# Patient Record
Sex: FEMALE | Race: WHITE | NOT HISPANIC OR LATINO | Employment: FULL TIME | URBAN - METROPOLITAN AREA
[De-identification: names, ages, dates, MRNs, and addresses within clinical notes are randomized per-mention and may not be internally consistent; named-entity substitution may affect disease eponyms.]

---

## 2017-01-06 ENCOUNTER — TELEPHONE (OUTPATIENT)
Dept: OBSTETRICS AND GYNECOLOGY | Facility: CLINIC | Age: 27
End: 2017-01-06

## 2017-01-06 NOTE — TELEPHONE ENCOUNTER
Pt c/o runny nose, sneezing, coughing, and low grade fever.  Suggested she go to urgent care for treatment.  She has been breastfeeding for 3 months, suggested Mucinex D but advised if she notices a decrease in supply to stop taking it.

## 2017-01-06 NOTE — TELEPHONE ENCOUNTER
Wax pt - pt said she has a cold and was running a low grade fever last night, she is breast feeding and wants to know what she can take for that. Also, she got an iud put in last week ans has been spotting this whole week, she wants to make sure that is normal.

## 2017-02-03 ENCOUNTER — OFFICE VISIT (OUTPATIENT)
Dept: OBSTETRICS AND GYNECOLOGY | Facility: CLINIC | Age: 27
End: 2017-02-03
Attending: OBSTETRICS & GYNECOLOGY
Payer: MEDICAID

## 2017-02-03 VITALS
DIASTOLIC BLOOD PRESSURE: 66 MMHG | HEIGHT: 66 IN | SYSTOLIC BLOOD PRESSURE: 110 MMHG | BODY MASS INDEX: 18.03 KG/M2 | WEIGHT: 112.19 LBS

## 2017-02-03 DIAGNOSIS — Z30.431 IUD CHECK UP: Primary | ICD-10-CM

## 2017-02-03 PROCEDURE — 99212 OFFICE O/P EST SF 10 MIN: CPT | Mod: PBBFAC | Performed by: OBSTETRICS & GYNECOLOGY

## 2017-02-03 PROCEDURE — 99213 OFFICE O/P EST LOW 20 MIN: CPT | Mod: S$PBB,,, | Performed by: OBSTETRICS & GYNECOLOGY

## 2017-02-03 PROCEDURE — 99999 PR PBB SHADOW E&M-EST. PATIENT-LVL II: CPT | Mod: PBBFAC,,, | Performed by: OBSTETRICS & GYNECOLOGY

## 2017-02-03 NOTE — PROGRESS NOTES
"CC: IUD check    Janene Greenberg is a 26 y.o. female  6 wk IUD check.  Having bleeding daily not heavy.  Hasn't had sex    Past Medical History   Diagnosis Date    Abnormal Pap smear of vagina 2014     colpo done no bx bc was pregnant    Depression      1st pregnancy       History reviewed. No pertinent past surgical history.    OB History    Para Term  AB SAB TAB Ectopic Multiple Living   3 3 3 0 0 0 0  0 1      # Outcome Date GA Lbr [place]/2nd Weight Sex Delivery Anes PTL Lv   3 Term 10/11/16 39w3d  3.402 kg (7 lb 8 oz) M Vag-Spont EPI N Y   2 Term 01/05/15 40w0d  3.856 kg (8 lb 8 oz) M Vag-Spont      1 Term 09 41w0d  3.799 kg (8 lb 6 oz) F Vag-Spont             History reviewed. No pertinent family history.    Social History   Substance Use Topics    Smoking status: Never Smoker    Smokeless tobacco: Never Used    Alcohol use No       Visit Vitals    /66    Ht 5' 6" (1.676 m)    Wt 50.9 kg (112 lb 3.4 oz)    Breastfeeding No    BMI 18.11 kg/m2       ROS:  GENERAL: Denies weight gain or weight loss. Feeling well overall.   SKIN: Denies rash or lesions.   HEAD: Denies head injury or headache.   NODES: Denies enlarged lymph nodes.   CHEST: Denies chest pain or shortness of breath.   CARDIOVASCULAR: Denies palpitations or left sided chest pain.   ABDOMEN: No abdominal pain, constipation, diarrhea, nausea, vomiting or rectal bleeding.   URINARY: No frequency, dysuria, hematuria, or burning on urination.  REPRODUCTIVE: See HPI.   BREASTS: denies pain, lumps, or nipple discharge.   HEMATOLOGIC: No easy bruisability or excessive bleeding.  MUSCULOSKELETAL: Denies joint pain or swelling.   NEUROLOGIC: Denies syncope or weakness.   PSYCHIATRIC: Denies depression, anxiety or mood swings.    Physical Exam:    APPEARANCE: Well nourished, well developed, in no acute distress.  AFFECT: WNL, alert and oriented x 3  SKIN: No acne or hirsutism  NECK: Neck symmetric without masses or " thyromegaly  NODES: No inguinal, cervical, axillary, or femoral lymph node enlargement  CHEST: Good respiratory effect  ABDOMEN: Soft.  No tenderness or masses.  No hepatosplenomegaly.  No hernias.  PELVIC: Normal external genitalia without lesions.  Normal hair distribution.  Adequate perineal body, normal urethral meatus.  Vagina moist and well rugated without lesions or discharge.  Cervix pink, without lesions, discharge or tenderness.  No significant cystocele or rectocele.  Bimanual exam shows uterus to be normal size, regular, mobile and nontender.  Adnexa without masses or tenderness.    EXTREMITIES: No edema.    ASSESSMENT AND PLAN    Diagnoses and all orders for this visit:    IUD check up    reassurance give it more time    Return if symptoms worsen or fail to improve.

## 2017-05-01 ENCOUNTER — TELEPHONE (OUTPATIENT)
Dept: OBSTETRICS AND GYNECOLOGY | Facility: CLINIC | Age: 27
End: 2017-05-01

## 2017-05-01 ENCOUNTER — OFFICE VISIT (OUTPATIENT)
Dept: OBSTETRICS AND GYNECOLOGY | Facility: CLINIC | Age: 27
End: 2017-05-01
Attending: OBSTETRICS & GYNECOLOGY
Payer: MEDICAID

## 2017-05-01 VITALS
BODY MASS INDEX: 18.09 KG/M2 | DIASTOLIC BLOOD PRESSURE: 74 MMHG | WEIGHT: 112.56 LBS | HEIGHT: 66 IN | SYSTOLIC BLOOD PRESSURE: 124 MMHG

## 2017-05-01 DIAGNOSIS — Z01.419 ENCOUNTER FOR GYNECOLOGICAL EXAMINATION: Primary | ICD-10-CM

## 2017-05-01 DIAGNOSIS — F41.9 ANXIETY AND DEPRESSION: ICD-10-CM

## 2017-05-01 DIAGNOSIS — Z12.4 SCREENING FOR MALIGNANT NEOPLASM OF THE CERVIX: ICD-10-CM

## 2017-05-01 DIAGNOSIS — F32.A ANXIETY AND DEPRESSION: ICD-10-CM

## 2017-05-01 PROCEDURE — 99999 PR PBB SHADOW E&M-EST. PATIENT-LVL III: CPT | Mod: PBBFAC,,, | Performed by: OBSTETRICS & GYNECOLOGY

## 2017-05-01 PROCEDURE — 88175 CYTOPATH C/V AUTO FLUID REDO: CPT

## 2017-05-01 PROCEDURE — 99395 PREV VISIT EST AGE 18-39: CPT | Mod: S$PBB,,, | Performed by: OBSTETRICS & GYNECOLOGY

## 2017-05-01 PROCEDURE — 99213 OFFICE O/P EST LOW 20 MIN: CPT | Mod: PBBFAC | Performed by: OBSTETRICS & GYNECOLOGY

## 2017-05-01 RX ORDER — ESCITALOPRAM OXALATE 10 MG/1
10 TABLET ORAL NIGHTLY
Qty: 30 TABLET | Refills: 11 | Status: SHIPPED | OUTPATIENT
Start: 2017-05-01 | End: 2017-11-26

## 2017-05-01 NOTE — MR AVS SNAPSHOT
"    [facility]  [address], [address]  [facility] [address]  Phone: [phone]  Fax: [phone]                  Janene Greenberg   2017 9:00 AM   Office Visit    Description:  Female : 1990   Provider:  Marc Granados MD   Department:  [facility]           Reason for Visit     Well Woman           Diagnoses this Visit        Comments    Screening for malignant neoplasm of the cervix    -  Primary            To Do List           Future Appointments        Provider Department Dept Phone    2017 9:45 AM Marc Granados MD [facility] [phone]      Goals (5 Years of Data)     None      [facility]     [facility] Care Line -  Assistance  Unless otherwise directed by your provider, please contact [facility], our nurse care line that is available for  assistance.     Registered nurses in the [facility] On [facility] Center provide: appointment scheduling, clinical advisement, health education, and other advisory services.  Call: [phone] (toll free)               Medications           Message regarding Medications     Verify the changes and/or additions to your medication regime listed below are the same as discussed with your clinician today.  If any of these changes or additions are incorrect, please notify your healthcare provider.             Verify that the below list of medications is an accurate representation of the medications you are currently taking.  If none reported, the list may be blank. If incorrect, please contact your healthcare provider. Carry this list with you in case of emergency.           Current Medications     levonorgestrel (MIRENA) 20 mcg/24 hr (5 years) IUD 1 each by Intrauterine route once. RETURN FOR INTRAUTERINE INSERTION IN THE OFFICE           Clinical Reference Information           Your Vitals Were     BP Height Weight BMI       124/74 5' 6" (1.676 m) 51.1 kg (112 lb 8.7 oz) 18.17 kg/m2       Blood " Pressure          Most Recent Value    BP  124/74      Allergies as of 5/1/2017     No Known Allergies      Immunizations Administered on Date of Encounter - 5/1/2017     None      Orders Placed During Today's Visit      Normal Orders This Visit    Liquid-based pap smear, screening       [facility] Sign-Up     Activating your [facility] account is as easy as 1-2-3!     1) Visit [email or web address], select Sign Up Now, enter this activation code and your date of birth, then select Next.  [email or web address]  Expires: 5/6/2017 12:09 PM      2) Create a username and password to use when you visit [facility] in the future and select a security question in case you lose your password and select Next.    3) Enter your e-mail address and click Sign Up!    Additional Information  If you have questions, please e-mail [email or web address] or call [phone] to talk to our [facility] staff. Remember, [facility] is NOT to be used for urgent needs. For medical emergencies, dial 911.         Language Assistance Services     ATTENTION: Language assistance services are available, free of charge. Please call [phone].      ATENCIÓN: Si habla caroline, tiene a blair disposición servicios gratuitos de asistencia lingüística. Llame al [phone].     CHÚ Ý: N?u b?n nói Ti?ng Vi?t, có các d?ch v? h? tr? ngôn ng? mi?n phí dành cho b?n. G?i s? [phone].         [facility] complies with applicable Federal civil rights laws and does not discriminate on the basis of race, color, national origin, age, disability, or sex.

## 2017-05-01 NOTE — TELEPHONE ENCOUNTER
Wax pt asking if she needs to take the lexapro daily or if she only needs to take it when she is feeling anxious or depressed. Advised that Lexapro is a medication that she would need to take every day in order for it to work. It is not a medication that you take when needed. Pt verbalized understanding and had no further questions

## 2017-05-01 NOTE — PROGRESS NOTES
"CC: Well woman exam    Janene Greenberg is a 26 y.o. female  presents for a well woman exam.  Breastfeeding 6mo having hard time staying at home with kids, significant anxiety, feels overwhelmed often.  Cries a lot. No SI/HI wants meds that I recommended previously.    Past Medical History:   Diagnosis Date    Abnormal Pap smear of cervix 2014    colpo done no bx bc was pregnant    Depression     1st pregnancy    HPV in female        History reviewed. No pertinent surgical history.    OB History    Para Term  AB SAB TAB Ectopic Multiple Living   3 3 3 0 0 0 0  0 1      # Outcome Date GA Lbr [place]/2nd Weight Sex Delivery Anes PTL Lv   3 Term 10/11/16 39w3d  3.402 kg (7 lb 8 oz) M Vag-Spont EPI N Y   2 Term 01/05/15 40w0d  3.856 kg (8 lb 8 oz) M Vag-Spont      1 Term 09 41w0d  3.799 kg (8 lb 6 oz) F Vag-Spont         Obstetric Comments   Menarche 12       Family History   Problem Relation Age of Onset    Breast cancer Maternal Grandmother     Cancer Father      lung    Colon cancer Neg Hx     Ovarian cancer Neg Hx        Social History   Substance Use Topics    Smoking status: Never Smoker    Smokeless tobacco: Never Used    Alcohol use No       /74  Ht 5' 6" (1.676 m)  Wt 51.1 kg (112 lb 8.7 oz)  Breastfeeding? Yes  BMI 18.17 kg/m2    ROS:  GENERAL: Denies weight gain or weight loss. Feeling well overall.   SKIN: Denies rash or lesions.   HEAD: Denies head injury or headache.   NODES: Denies enlarged lymph nodes.   CHEST: Denies chest pain or shortness of breath.   CARDIOVASCULAR: Denies palpitations or left sided chest pain.   ABDOMEN: No abdominal pain, constipation, diarrhea, nausea, vomiting or rectal bleeding.   URINARY: No frequency, dysuria, hematuria, or burning on urination.  REPRODUCTIVE: See HPI.   BREASTS: The patient performs breast self-examination and denies pain, lumps, or nipple discharge.   HEMATOLOGIC: No easy bruisability or excessive " bleeding.  MUSCULOSKELETAL: Denies joint pain or swelling.   NEUROLOGIC: Denies syncope or weakness.   PSYCHIATRIC: Denies depression, anxiety or mood swings.    Physical Exam:    APPEARANCE: Well nourished, well developed, in no acute distress.  AFFECT: WNL, alert and oriented x 3  SKIN: No acne or hirsutism  NECK: Neck symmetric without masses or thyromegaly  NODES: No inguinal, cervical, axillary, or femoral lymph node enlargement  CHEST: Good respiratory effect  ABDOMEN: Soft.  No tenderness or masses.  No hepatosplenomegaly.  No hernias.  BREASTS: Symmetrical, no skin changes or visible lesions.  No palpable masses, nipple discharge bilaterally.  PELVIC: Normal external genitalia without lesions.  Normal hair distribution.  Adequate perineal body, normal urethral meatus.  Vagina moist and well rugated without lesions or discharge.  +IUD Cervix pink, without lesions, discharge or tenderness.  No significant cystocele or rectocele.  Bimanual exam shows uterus to be normal size, regular, mobile and nontender.  Adnexa without masses or tenderness.    EXTREMITIES: No edema.    ASSESSMENT AND PLAN  1. Encounter for gynecological examination     2. Screening for malignant neoplasm of the cervix  Liquid-based pap smear, screening   3. Anxiety and depression         Patient was counseled today on A.C.S. Pap guidelines and recommendations for yearly pelvic exams, mammograms and monthly self breast exams; to see her PCP for other health maintenance.     Start lexapro 5mg x 5 days then 10mg daily.      Return in about 4 weeks (around 5/29/2017) for depression, anxiety check .

## 2017-05-02 ENCOUNTER — TELEPHONE (OUTPATIENT)
Dept: OBSTETRICS AND GYNECOLOGY | Facility: CLINIC | Age: 27
End: 2017-05-02

## 2017-05-02 NOTE — TELEPHONE ENCOUNTER
This can be very common with Lexapro.  See if she is willing to try it another couple of days.  The symptoms usually resolve by the end of the first week.  Is it tolerable?  If it is tolerable then encouraged her to continue the meds.  If not then stop meds   Then forward / send this to Dr. lay so that she can look at it over the next day or so and switch her to something else.

## 2017-05-02 NOTE — TELEPHONE ENCOUNTER
When I called her back to advise her per Dr. Hou she was cheerful and states she feels so much better.  She is asking if she can take a quarter of a tab 2.5mg or wait until the weekend to see how she does so she isn't alone with the kids and falling asleep.  She wants to try 1/4 of a tab until the weekend and slowly work up to a whole tab.  Reassured her that it should be OK for her to start with a smaller dose and work up but that 2.5mg is not a therapeutic dose so she shouldn't stay on it longer than a couple days.

## 2017-05-02 NOTE — TELEPHONE ENCOUNTER
"Pt states she took a 1/2 pill of Lexapro 10mg last night.  It made her very sleepy and nauseated.  She took medication with food.  This AM she woke up with diarrhea.  States her mind is racing but moving slowly, even her coworkers commented that she seems "off".  She feels like she is sensitive to medication.      Dr. Granados is out of the office until Thursday but she doesn't think she should wait until then.      Allergies and pharmacy UTD  "

## 2017-05-02 NOTE — TELEPHONE ENCOUNTER
Wax pt, was prescribed lexapro and took half a pill yesterday. Pt feels super slow, mind is racing.

## 2017-05-04 NOTE — TELEPHONE ENCOUNTER
I called pt no answer, left detailed message. I agree 5mg is better, and recommended taking it after kids fall asleep.  If her depression worsens then she needs to stop asap Please call her to make sure she got my message

## 2017-05-04 NOTE — TELEPHONE ENCOUNTER
Pt got Dr. Granados's message, states she is doing much better one 1/4 and after a week she will increase to 5mg.  Advised to stop medication immediately if depression worsens.

## 2017-08-02 ENCOUNTER — TELEPHONE (OUTPATIENT)
Dept: OBSTETRICS AND GYNECOLOGY | Facility: CLINIC | Age: 27
End: 2017-08-02

## 2017-08-02 NOTE — TELEPHONE ENCOUNTER
Pt had HPV in 2009 then tested negative in 2016.  There was no HPV testing in 2017.      She is asking if she should have an HPV screen now or can she wait until next pap.      I told her she can wait until next pap in 2018 unless she is concerned and wanted to come in sooner for HPV testing.

## 2017-08-02 NOTE — TELEPHONE ENCOUNTER
Wax pt, wants to know if she needs to come back for STD testing because she has tested positive in the past. Pt not having symptoms.

## 2017-08-07 NOTE — TELEPHONE ENCOUNTER
Pap from last year neg cytology and pap in 2015 neg cytology as well. Pt is 26 so no hpv testing done recently.

## 2017-08-08 NOTE — TELEPHONE ENCOUNTER
Call pt she had not hpv testing because is someone under 30 it is only done if the pap comes back abnormal.  Her last two paps were normal so all is good and hpv testing is not indicated in her age if her pap is normal.

## 2017-10-31 ENCOUNTER — OFFICE VISIT (OUTPATIENT)
Dept: URGENT CARE | Facility: CLINIC | Age: 27
End: 2017-10-31
Payer: MEDICAID

## 2017-10-31 VITALS
BODY MASS INDEX: 21.9 KG/M2 | DIASTOLIC BLOOD PRESSURE: 67 MMHG | TEMPERATURE: 97 F | OXYGEN SATURATION: 98 % | WEIGHT: 119 LBS | SYSTOLIC BLOOD PRESSURE: 106 MMHG | RESPIRATION RATE: 18 BRPM | HEART RATE: 83 BPM | HEIGHT: 62 IN

## 2017-10-31 DIAGNOSIS — J40 BRONCHITIS: Primary | ICD-10-CM

## 2017-10-31 LAB
CTP QC/QA: YES
FLUAV AG NPH QL: NEGATIVE
FLUBV AG NPH QL: NEGATIVE

## 2017-10-31 PROCEDURE — 99203 OFFICE O/P NEW LOW 30 MIN: CPT | Mod: 25,S$GLB,, | Performed by: PHYSICIAN ASSISTANT

## 2017-10-31 PROCEDURE — 87804 INFLUENZA ASSAY W/OPTIC: CPT | Mod: QW,S$GLB,, | Performed by: PHYSICIAN ASSISTANT

## 2017-10-31 RX ORDER — ALBUTEROL SULFATE 90 UG/1
2 AEROSOL, METERED RESPIRATORY (INHALATION) EVERY 6 HOURS PRN
Qty: 18 G | Refills: 0 | Status: SHIPPED | OUTPATIENT
Start: 2017-10-31 | End: 2017-11-26

## 2017-10-31 RX ORDER — AZITHROMYCIN 250 MG/1
TABLET, FILM COATED ORAL
Qty: 6 TABLET | Refills: 0 | Status: SHIPPED | OUTPATIENT
Start: 2017-10-31 | End: 2017-11-26

## 2017-10-31 NOTE — PATIENT INSTRUCTIONS
- Please return here or go to the Emergency Department for any concerns or worsening of condition.   - You have been prescribed antibiotics but your are instructed to withhold taking the antibiotics for the specified period of time discussed by the provider to see if your symptoms improve with other medications prescribed/suggested as your illness may be viral (viruses do not respond to antibiotics). If the antibiotics are started please take them to completion.    - Please follow up with your primary care provider (PCP) or discussed specialist(s) as needed.         Acute Bronchitis  Your healthcare provider has told you that you have acute bronchitis. Bronchitis is infection or inflammation of the bronchial tubes (airways in the lungs). Normally, air moves easily in and out of the airways. Bronchitis narrows the airways, making it harder for air to flow in and out of the lungs. This causes symptoms such as shortness of breath, coughing up yellow or green mucus, and wheezing. Bronchitis can be acute or chronic. Acute means the condition comes on quickly and goes away in a short time, usually within 3 to 10 days. Chronic means a condition lasts a long time and often comes back.    What causes acute bronchitis?  Acute bronchitis almost always starts as a viral respiratory infection, such as a cold or the flu. Certain factors make it more likely for a cold or flu to turn into bronchitis. These include being very young, being elderly, having a heart or lung problem, or having a weak immune system. Cigarette smoking also makes bronchitis more likely.  When bronchitis develops, the airways become swollen. The airways may also become infected with bacteria. This is known as a secondary infection.  Diagnosing acute bronchitis  Your healthcare provider will examine you and ask about your symptoms and health history. You may also have a sputum culture to test the fluid in your lungs. Chest X-rays may be done to look for  infection in the lungs.  Treating acute bronchitis  Bronchitis usually clears up as the cold or flu goes away. You can help feel better faster by doing the following:  · Take medicine as directed. You may be told to take ibuprofen or other over-the-counter medicines. These help relieve inflammation in your bronchial tubes. Your healthcare provider may prescribe an inhaler to help open up the bronchial tubes. Most of the time, acute bronchitis is caused by a viral infection. Antibiotics are usually not prescribed for viral infections.  · Drink plenty of fluids, such as water, juice, or warm soup. Fluids loosen mucus so that you can cough it up. This helps you breathe more easily. Fluids also prevent dehydration.  · Make sure you get plenty of rest.  · Do not smoke. Do not allow anyone else to smoke in your home.  Recovery and follow-up  Follow up with your doctor as you are told. You will likely feel better in a week or two. But a dry cough can linger beyond that time. Let your doctor know if you still have symptoms (other than a dry cough) after 2 weeks, or if youre prone to getting bronchial infections. Take steps to protect yourself from future infections. These steps include stopping smoking and avoiding tobacco smoke, washing your hands often, and getting a yearly flu shot.  When to call your healthcare provider  Call the healthcare provider if you have any of the following:  · Fever of 100.4°F (38.0°C) or higher, or as advised  · Symptoms that get worse, or new symptoms  · Trouble breathing  · Symptoms that dont start to improve within a week, or within 3 days of taking antibiotics   Date Last Reviewed: 12/1/2016  © 7345-0443 [employer]. [address] [place] [address], [place] [address]. All rights reserved. This information is not intended as a substitute for professional medical care. Always follow your healthcare professional's instructions.

## 2017-10-31 NOTE — PROGRESS NOTES
"Subjective:       Patient ID: Janene Greenberg is a 26 y.o. female.    Vitals:  height is 5' 2" (1.575 m) and weight is 54 kg (119 lb). Her oral temperature is 97.3 °F (36.3 °C). Her blood pressure is 106/67 and her pulse is 83. Her respiration is 18 and oxygen saturation is 98%.     Chief Complaint: Fever and Cough    Fever    This is a new problem. The current episode started in the past 7 days. The problem occurs daily. The problem has been gradually worsening. The maximum temperature noted was 100 to 100.9 F. The temperature was taken using an oral thermometer. Associated symptoms include congestion and coughing. Pertinent negatives include no abdominal pain, chest pain, diarrhea, ear pain, headaches, nausea, rash, sore throat, vomiting or wheezing. She has tried acetaminophen for the symptoms. The treatment provided mild relief.   Cough   This is a new problem. The current episode started yesterday. The problem has been gradually worsening. The problem occurs constantly. The cough is productive of sputum. Associated symptoms include a fever and myalgias (body aches). Pertinent negatives include no chest pain, chills, ear pain, eye redness, headaches, rash, sore throat, shortness of breath or wheezing. Nothing aggravates the symptoms. She has tried OTC cough suppressant for the symptoms. The treatment provided mild relief.     Review of Systems   Constitution: Positive for fever. Negative for chills and malaise/fatigue.   HENT: Positive for congestion. Negative for ear pain, hoarse voice and sore throat.    Eyes: Negative for blurred vision, discharge, pain, redness and visual disturbance.   Cardiovascular: Negative for chest pain, dyspnea on exertion, leg swelling, near-syncope and syncope.   Respiratory: Positive for cough and sputum production. Negative for shortness of breath and wheezing.    Hematologic/Lymphatic: Negative for adenopathy.   Skin: Negative for itching and rash.   Musculoskeletal: Positive for " myalgias (body aches). Negative for back pain, neck pain and stiffness.   Gastrointestinal: Negative for abdominal pain, diarrhea, nausea and vomiting.   Neurological: Negative for dizziness, headaches, light-headedness and numbness.   Psychiatric/Behavioral: Negative for altered mental status.   Allergic/Immunologic: Negative for hives.   All other systems reviewed and are negative.      Objective:      Physical Exam   Constitutional: She is oriented to person, place, and time. She appears well-developed and well-nourished.  Non-toxic appearance. She has a sickly appearance. She does not appear ill. No distress.   HENT:   Head: Normocephalic and atraumatic.   Right Ear: Hearing, tympanic membrane, external ear and ear canal normal.   Left Ear: Hearing, tympanic membrane, external ear and ear canal normal.   Nose: No mucosal edema. No epistaxis. Right sinus exhibits no maxillary sinus tenderness and no frontal sinus tenderness. Left sinus exhibits no maxillary sinus tenderness and no frontal sinus tenderness.   Mouth/Throat: Uvula is midline and mucous membranes are normal. No uvula swelling. Posterior oropharyngeal erythema present. No oropharyngeal exudate.   Bilateral nasal congestion and erythema    Eyes: Pupils are equal, round, and reactive to light.   Neck: Normal range of motion and full passive range of motion without pain. Neck supple. No neck rigidity.   Cardiovascular: Normal rate, regular rhythm and normal heart sounds.  Exam reveals no gallop and no friction rub.    No murmur heard.  Pulmonary/Chest: Effort normal. No accessory muscle usage. No tachypnea and no bradypnea. No respiratory distress. She has no decreased breath sounds. She has no wheezes. She has rhonchi in the right middle field and the left middle field. She has no rales. She exhibits no tenderness and no crepitus.   Musculoskeletal: Normal range of motion.   Lymphadenopathy:        Head (right side): No submental, no submandibular, no  "preauricular, no posterior auricular and no occipital adenopathy present.        Head (left side): No submental, no submandibular, no preauricular, no posterior auricular and no occipital adenopathy present.     She has no cervical adenopathy.        Right cervical: No posterior cervical adenopathy present.       Left cervical: No posterior cervical adenopathy present.        Right: No supraclavicular adenopathy present.        Left: No supraclavicular adenopathy present.   Neurological: She is alert and oriented to person, place, and time. She is not disoriented. Coordination and gait normal.   Skin: No abrasion, no ecchymosis, no laceration and no rash noted. No erythema.   Psychiatric: She has a normal mood and affect. Her behavior is normal. Judgment normal. Cognition and memory are normal.   Nursing note and vitals reviewed.      /67 (BP Location: Right arm, Patient Position: Sitting, BP Method: Medium (Automatic))   Pulse 83   Temp 97.3 °F (36.3 °C) (Oral)   Resp 18   Ht 5' 2" (1.575 m)   Wt 54 kg (119 lb)   SpO2 98%   BMI 21.77 kg/m²      Assessment:       1. Bronchitis        Plan:         Bronchitis  -     POCT Influenza A/B  -     budesonide (PULMICORT FLEXHALER) 90 mcg/actuation AePB; Inhale 2 puffs (180 mcg total) into the lungs 2 (two) times daily. Controller  Dispense: 1 each; Refill: 0  -     albuterol 90 mcg/actuation inhaler; Inhale 2 puffs into the lungs every 6 (six) hours as needed for Shortness of Breath (or cough). Rescue  Dispense: 18 g; Refill: 0  -     azithromycin (ZITHROMAX Z-PATTI) 250 MG tablet; Two tablets once on day one followed by one tablet daily for 5 days  Dispense: 6 tablet; Refill: 0    - Please return here or go to the Emergency Department for any concerns or worsening of condition.   - You have been prescribed antibiotics but your are instructed to withhold taking the antibiotics for the specified period of time discussed by the provider to see if your symptoms " improve with other medications prescribed/suggested as your illness may be viral (viruses do not respond to antibiotics). If the antibiotics are started please take them to completion.    - Please follow up with your primary care provider (PCP) or discussed specialist(s) as needed.

## 2017-11-26 ENCOUNTER — HOSPITAL ENCOUNTER (EMERGENCY)
Facility: HOSPITAL | Age: 27
Discharge: HOME | End: 2017-11-26
Attending: EMERGENCY MEDICINE
Payer: MEDICAID

## 2017-11-26 VITALS
WEIGHT: 108 LBS | DIASTOLIC BLOOD PRESSURE: 53 MMHG | HEART RATE: 67 BPM | OXYGEN SATURATION: 98 % | SYSTOLIC BLOOD PRESSURE: 107 MMHG | RESPIRATION RATE: 18 BRPM | BODY MASS INDEX: 19.88 KG/M2 | TEMPERATURE: 98 F | HEIGHT: 62 IN

## 2017-11-26 DIAGNOSIS — G43.109 MIGRAINE WITH AURA AND WITHOUT STATUS MIGRAINOSUS, NOT INTRACTABLE: Primary | ICD-10-CM

## 2017-11-26 LAB
B-HCG UR QL: NEGATIVE
CTP QC/QA: YES

## 2017-11-26 PROCEDURE — 25000003 PHARM REV CODE 250: Performed by: EMERGENCY MEDICINE

## 2017-11-26 PROCEDURE — 63600175 PHARM REV CODE 636 W HCPCS: Performed by: EMERGENCY MEDICINE

## 2017-11-26 PROCEDURE — 96372 THER/PROPH/DIAG INJ SC/IM: CPT

## 2017-11-26 PROCEDURE — 96374 THER/PROPH/DIAG INJ IV PUSH: CPT

## 2017-11-26 PROCEDURE — 99283 EMERGENCY DEPT VISIT LOW MDM: CPT | Mod: 25

## 2017-11-26 RX ORDER — HALOPERIDOL 5 MG/ML
2 INJECTION INTRAMUSCULAR
Status: COMPLETED | OUTPATIENT
Start: 2017-11-26 | End: 2017-11-26

## 2017-11-26 RX ORDER — SUMATRIPTAN 50 MG/1
50 TABLET, FILM COATED ORAL
Qty: 10 TABLET | Refills: 0 | Status: SHIPPED | OUTPATIENT
Start: 2017-11-26 | End: 2021-09-16

## 2017-11-26 RX ORDER — LORAZEPAM 1 MG/1
1 TABLET ORAL
Status: COMPLETED | OUTPATIENT
Start: 2017-11-26 | End: 2017-11-26

## 2017-11-26 RX ORDER — ONDANSETRON 2 MG/ML
4 INJECTION INTRAMUSCULAR; INTRAVENOUS
Status: COMPLETED | OUTPATIENT
Start: 2017-11-26 | End: 2017-11-26

## 2017-11-26 RX ORDER — IBUPROFEN 600 MG/1
600 TABLET ORAL
Status: COMPLETED | OUTPATIENT
Start: 2017-11-26 | End: 2017-11-26

## 2017-11-26 RX ADMIN — LORAZEPAM 1 MG: 1 TABLET ORAL at 02:11

## 2017-11-26 RX ADMIN — HALOPERIDOL LACTATE 2 MG: 5 INJECTION, SOLUTION INTRAMUSCULAR at 02:11

## 2017-11-26 RX ADMIN — ONDANSETRON 4 MG: 2 INJECTION INTRAMUSCULAR; INTRAVENOUS at 02:11

## 2017-11-26 RX ADMIN — IBUPROFEN 600 MG: 600 TABLET, FILM COATED ORAL at 02:11

## 2017-11-26 NOTE — ED PROVIDER NOTES
Encounter Date: 11/26/2017       History     Chief Complaint   Patient presents with    Migraine     generalized headache that started this AM when she woke up with nausea and vomiting. Reports photosensitivity. Took 2 extra strength Tylenol pills at 10 am with slight relief. Hx of migraines.     27-year-old female presents to the emergency department with a migraine that started this morning.  Bitemporal, with associated aura.  She started feeling nauseated had 4 episodes of vomiting.  She took Tylenol prior to arrival.  Her headache was slightly improved, though still pulsating.  She denies chest pain, shortness of breath, URI symptoms.  She denies anxiety, or recent stressors.  She has previously had a history of migraines, but is had about 4 in her entire life.          Review of patient's allergies indicates:  No Known Allergies  Past Medical History:   Diagnosis Date    Abnormal Pap smear of cervix 06/2014    colpo done no bx bc was pregnant    Depression     1st pregnancy    HPV in female      No past surgical history on file.  Family History   Problem Relation Age of Onset    Breast cancer Maternal Grandmother     Cancer Father      lung    Colon cancer Neg Hx     Ovarian cancer Neg Hx      Social History   Substance Use Topics    Smoking status: Never Smoker    Smokeless tobacco: Never Used    Alcohol use No     Review of Systems   Eyes: Negative.    Endocrine: Negative.    Genitourinary: Negative.    All other systems reviewed and are negative.      Physical Exam     Initial Vitals [11/26/17 1347]   BP Pulse Resp Temp SpO2   128/80 94 18 98.3 °F (36.8 °C) 100 %      MAP       96         Physical Exam    Nursing note and vitals reviewed.  Constitutional: She appears well-developed and well-nourished. She appears distressed.   HENT:   Head: Normocephalic and atraumatic.   Eyes: EOM are normal. Pupils are equal, round, and reactive to light.   Neck: Normal range of motion. Neck supple.    Cardiovascular: Normal rate and regular rhythm.   Pulmonary/Chest: No respiratory distress.   Abdominal: Soft.   Musculoskeletal: Normal range of motion.   Neurological: She is alert and oriented to person, place, and time. She has normal strength. No cranial nerve deficit.   She walks with a normal gait  No dysmetria with finger-nose-finger  Negative iyer's of upper extremities     Skin: Skin is warm and dry.   Psychiatric: She has a normal mood and affect. Her behavior is normal. Thought content normal.         ED Course   Procedures  Labs Reviewed - No data to display          Medical Decision Making:   Initial Assessment:   26 yo F here with h/o migraines, here with migraine HA that started this morning. Similar to previous migraines, and resolved after   Differential Diagnosis:   I have considered the differentials of tension headache, pseudotumor cerebri, temporal arteritis, ICH/SAH, Intracranial mass, migraine, meningitis, cluster headache, sinus headache as I would in ANY patient presenting to ED with a headache    Janene is feeling much better after receiving haldol and lorazepam, zofran and motrin today for her headache. Given her prior history of migraines and symptoms today that are similar to previous episodes, it's reasonable to attribute today's headache to a migraine. Specifically, she denies sudden onset of headache, that is was the worst, or the first headache of her life,  she denies fevers, chills, neck stiffness, rash, vision changes, eye pain, pain over the temporal artery, weakness or numbness. In combination with her very normal neurologic exam, my suspicion for subarachnoid hemorrhage, meningitis, temporal arteritis, acute angle glaucoma or space occupying lesion is therefore quite low. Given that, I see no indication for CT imaging or further lab workup. she is in agreement, and would like to go home. she expressed understanding for reasons to return to the ED, and will make a follow up  appointment outpatient.     Impression: Migraine, resolved    Disposition: Discharged  Condition: Stable                   ED Course as of Nov 26 1523   Sun Nov 26, 2017   1513 Mgiraine completely resolved. Will dc home, PCP referral info  [MG]      ED Course User Index  [MG] Letty Zarco MD     Clinical Impression:   The encounter diagnosis was Migraine with aura and without status migrainosus, not intractable.                           Letty Zarco MD  11/26/17 1522

## 2017-11-26 NOTE — ED NOTES
Patient has verified the spelling of their name and  on armband  LOC: The patient is awake, alert, and aware of environment with an appropriate affect, the patient is oriented x 3 and speaking appropriately.   APPEARANCE: Patient resting comfortably and in no acute distress, patient is clean and well groomed, patient's clothing is properly fastened.   SKIN: The skin is warm and dry, color consistent with ethnicity, patient has normal skin turgor and moist mucus membranes, skin intact, no breakdown or bruising noted.   :   Voids without difficulty  MUSCULOSKELETAL: Patient moving all extremities spontaneously, no obvious swelling or deformities noted.   RESPIRATORY: Airway is open and patent, respirations are spontaneous, patient has a normal effort and rate, no accessory muscle use noted, bilateral breath sounds clear, denies SOB   ABDOMEN: Soft and non tender to palpation, no distention noted, normoactive bowel sounds present in all four quadrants.   CARDIAC:  Normal rate and rhythm, no peripheral edema noted, less then 3 second capillary refill, denies chest pain  COMPLAINT:  Migraine headache began today with photosensitivity and nausea, rates pain 9 out of 10, took Tylenol at 0900 prior to arrival

## 2018-05-14 ENCOUNTER — TELEPHONE (OUTPATIENT)
Dept: OBSTETRICS AND GYNECOLOGY | Facility: CLINIC | Age: 28
End: 2018-05-14

## 2018-05-14 DIAGNOSIS — Z97.5 BREAKTHROUGH BLEEDING WITH IUD: Primary | ICD-10-CM

## 2018-05-14 DIAGNOSIS — N92.1 BREAKTHROUGH BLEEDING WITH IUD: Primary | ICD-10-CM

## 2018-05-14 NOTE — TELEPHONE ENCOUNTER
Pt had IUD inserted 12/2016.  She stopped breastfeeding 2 weeks ago and got her period for the first time.  She started spotting a week after and is still spotting.  She is concerned that she could be pregnant, recommended she take a UPT.  Also reports discharge and itching.  Scheduled US and appt with Dr. Luico tomorrow. Aware of US prep.

## 2018-05-15 ENCOUNTER — OFFICE VISIT (OUTPATIENT)
Dept: OBSTETRICS AND GYNECOLOGY | Facility: CLINIC | Age: 28
End: 2018-05-15
Payer: MEDICAID

## 2018-05-15 ENCOUNTER — APPOINTMENT (OUTPATIENT)
Dept: RADIOLOGY | Facility: CLINIC | Age: 28
End: 2018-05-15
Attending: OBSTETRICS & GYNECOLOGY
Payer: MEDICAID

## 2018-05-15 VITALS
BODY MASS INDEX: 23.13 KG/M2 | DIASTOLIC BLOOD PRESSURE: 76 MMHG | SYSTOLIC BLOOD PRESSURE: 124 MMHG | HEIGHT: 62 IN | WEIGHT: 125.69 LBS

## 2018-05-15 DIAGNOSIS — Z97.5 BREAKTHROUGH BLEEDING WITH IUD: ICD-10-CM

## 2018-05-15 DIAGNOSIS — N92.1 BREAKTHROUGH BLEEDING WITH IUD: ICD-10-CM

## 2018-05-15 DIAGNOSIS — N76.0 ACUTE VAGINITIS: Primary | ICD-10-CM

## 2018-05-15 DIAGNOSIS — N93.9 ABNORMAL UTERINE BLEEDING: ICD-10-CM

## 2018-05-15 LAB
CANDIDA RRNA VAG QL PROBE: NEGATIVE
G VAGINALIS RRNA GENITAL QL PROBE: POSITIVE
T VAGINALIS RRNA GENITAL QL PROBE: NEGATIVE

## 2018-05-15 PROCEDURE — 99213 OFFICE O/P EST LOW 20 MIN: CPT | Mod: PBBFAC | Performed by: OBSTETRICS & GYNECOLOGY

## 2018-05-15 PROCEDURE — 87480 CANDIDA DNA DIR PROBE: CPT

## 2018-05-15 PROCEDURE — 87491 CHLMYD TRACH DNA AMP PROBE: CPT

## 2018-05-15 PROCEDURE — 76856 US EXAM PELVIC COMPLETE: CPT | Mod: 26,,, | Performed by: RADIOLOGY

## 2018-05-15 PROCEDURE — 99213 OFFICE O/P EST LOW 20 MIN: CPT | Mod: S$PBB,,, | Performed by: OBSTETRICS & GYNECOLOGY

## 2018-05-15 PROCEDURE — 99999 PR PBB SHADOW E&M-EST. PATIENT-LVL III: CPT | Mod: PBBFAC,,, | Performed by: OBSTETRICS & GYNECOLOGY

## 2018-05-15 PROCEDURE — 76830 TRANSVAGINAL US NON-OB: CPT | Mod: 26,,, | Performed by: RADIOLOGY

## 2018-05-15 PROCEDURE — 87510 GARDNER VAG DNA DIR PROBE: CPT

## 2018-05-15 NOTE — PROGRESS NOTES
"  Chief Complaint: BTB with IUD, Vaginitis     HPI:      Janene Greenberg is a 27 y.o.  who presents complaining of break through bleeding with IUD. Patient had Mirena placed in 2016. Since that time has been doing well. 2 weeks ago she stopped breast feeding and had 7 days of what she characterized as a normal period. She states that after the initial bleeding stopped she continued spotting, and is still having some light bleeding. She is also having itch and discharge. States that the discharge is yellow and the itch is internal and external. These symptoms began 2 weeks ago. She has also been having cramping since her menses, on alternating sides. Pain is so brief she does not have time to take medications for it.  Ms. Greenberg is currently sexually active with a single male partner.Patient does not have regular monthly menses. No LMP recorded. Patient has had an implant.    ROS:     GENERAL: Denies fevers or chills. Feeling well overall.   ABDOMEN: Denies abdominal pain, constipation, diarrhea, nausea, vomiting, change in appetite.   URINARY: Denies frequency, dysuria, hematuria.  NEUROLOGIC: Denies syncope or weakness.     Physical Exam:      PHYSICAL EXAM:  /76   Ht 5' 2" (1.575 m)   Wt 57 kg (125 lb 10.6 oz)   Breastfeeding? Yes   BMI 22.98 kg/m²   Body mass index is 22.98 kg/m².     APPEARANCE: Well nourished, well developed, in no acute distress.  ABDOMEN: Soft.  No tenderness or masses.    PELVIC: Normal external genitalia without lesions.  Normal hair distribution.  Adequate perineal body, normal urethral meatus.  Vagina moist and well rugated without lesions or discharge.  Cervix pink, without lesions, discharge or tenderness. IUD strings visible at cervical os.  No significant cystocele or rectocele.  Bimanual exam shows uterus to be normal size, regular, mobile and nontender.  Adnexa without masses or tenderness.    EXTREMITIES: No edema.     Results:      Pelvic U/S performed in clinic today: "   Uterus: Measures 7.5 x 3.2 x 4.9 cm.  IUD noted within the endometrial canal.  Endometrial echo complex obscured, but not thickened.  Appearance is unremarkable.    Right ovary: Measures 3.3 x 3.6 x 2.9 cm.  Appearance is unremarkable.  Blood flow is present.    Left ovary: Measures 3.2 x 4.0 x 2.0 cm.  Appearance is unremarkable.  Blood flow is present.    Miscellaneous: No free fluid.    Assessment/Plan:     Acute vaginitis  -     Vaginosis Screen by DNA Probe  -     C. trachomatis/N. gonorrhoeae by AMP DNA Cervicovaginal    Abnormal uterine bleeding  -     Vaginosis Screen by DNA Probe  -     C. trachomatis/N. gonorrhoeae by AMP DNA Cervicovaginal      Counseling:       Use of the [employer] Patient Portal discussed and encouraged during today's visit.

## 2018-05-16 ENCOUNTER — PATIENT MESSAGE (OUTPATIENT)
Dept: OBSTETRICS AND GYNECOLOGY | Facility: CLINIC | Age: 28
End: 2018-05-16

## 2018-05-16 LAB
C TRACH DNA SPEC QL NAA+PROBE: NOT DETECTED
N GONORRHOEA DNA SPEC QL NAA+PROBE: NOT DETECTED

## 2018-05-16 RX ORDER — METRONIDAZOLE 500 MG/1
500 TABLET ORAL 2 TIMES DAILY
Qty: 14 TABLET | Refills: 0 | Status: SHIPPED | OUTPATIENT
Start: 2018-05-16 | End: 2018-05-23

## 2018-05-29 ENCOUNTER — TELEPHONE (OUTPATIENT)
Dept: OBSTETRICS AND GYNECOLOGY | Facility: CLINIC | Age: 28
End: 2018-05-29

## 2018-05-29 NOTE — TELEPHONE ENCOUNTER
Spoke to pt letting her know that Dr. Kumar sent her a message on the portal that has not been read. I told that her swab came back positive for bv and her rx was sent to pharmacy. Pt voiced understanding.

## 2018-11-05 ENCOUNTER — OFFICE VISIT (OUTPATIENT)
Dept: URGENT CARE | Facility: CLINIC | Age: 28
End: 2018-11-05
Payer: MEDICAID

## 2018-11-05 VITALS
DIASTOLIC BLOOD PRESSURE: 76 MMHG | HEART RATE: 99 BPM | TEMPERATURE: 99 F | BODY MASS INDEX: 21.71 KG/M2 | RESPIRATION RATE: 18 BRPM | WEIGHT: 118 LBS | HEIGHT: 62 IN | OXYGEN SATURATION: 96 % | SYSTOLIC BLOOD PRESSURE: 107 MMHG

## 2018-11-05 DIAGNOSIS — B34.9 VIRAL SYNDROME: Primary | ICD-10-CM

## 2018-11-05 LAB
CTP QC/QA: YES
FLUAV AG NPH QL: NEGATIVE
FLUBV AG NPH QL: NEGATIVE

## 2018-11-05 PROCEDURE — 87804 INFLUENZA ASSAY W/OPTIC: CPT | Mod: 59,QW,S$GLB, | Performed by: PHYSICIAN ASSISTANT

## 2018-11-05 PROCEDURE — 99214 OFFICE O/P EST MOD 30 MIN: CPT | Mod: S$GLB,,, | Performed by: PHYSICIAN ASSISTANT

## 2018-11-05 NOTE — LETTER
November 5, 2018      [facility]  [address]  [place] [address]  Phone: [phone]  Fax: [phone]       Patient: Janene Greenberg   YOB: 1990  Date of Visit: 11/05/2018    To Whom It May Concern:    Kody Greenberg  was at [facility] on 11/05/2018. She may return to work/school on 11/7/2018 with no restrictions. If you have any questions or concerns, or if I can be of further assistance, please do not hesitate to contact me.    Sincerely,    Madeline Warren PA-C

## 2018-11-06 ENCOUNTER — TELEPHONE (OUTPATIENT)
Dept: URGENT CARE | Facility: CLINIC | Age: 28
End: 2018-11-06

## 2018-11-06 NOTE — PATIENT INSTRUCTIONS
"Take tylenol/motrin as needed for pain/fever.  Rest and stay hydrated.    Please follow up with your primary care provider within 2-5 days if your signs and symptoms have not resolved or worsen.     If your condition worsens or fails to improve we recommend that you receive another evaluation at the emergency room immediately or contact your primary medical clinic to discuss your concerns.   You must understand that you have received an Urgent Care treatment only and that you may be released before all of your medical problems are known or treated. You, the patient, will arrange for follow up care as instructed.         Viral Syndrome (Adult)  A viral illness may cause a number of symptoms. The symptoms depend on the part of the body that the virus affects. If it settles in your nose, throat, and lungs, it may cause cough, sore throat, congestion, and sometimes headache. If it settles in your stomach and intestinal tract, it may cause vomiting and diarrhea. Sometimes it causes vague symptoms like "aching all over," feeling tired, loss of appetite, or fever.  A viral illness usually lasts 1 to 2 weeks, but sometimes it lasts longer. In some cases, a more serious infection can look like a viral syndrome in the first few days of the illness. You may need another exam and additional tests to know the difference. Watch for the warning signs listed below.  Home care  Follow these guidelines for taking care of yourself at home:  · If symptoms are severe, rest at home for the first 2 to 3 days.  · Stay away from cigarette smoke - both your smoke and the smoke from others.  · You may use over-the-counter acetaminophen or ibuprofen for fever, muscle aching, and headache, unless another medicine was prescribed for this. If you have chronic liver or kidney disease or ever had a stomach ulcer or GI bleeding, talk with your doctor before using these medicines. No one who is younger than 18 and ill with a fever should take " aspirin. It may cause severe disease or death.  · Your appetite may be poor, so a light diet is fine. Avoid dehydration by drinking 8 to 12 8-ounce glasses of fluids each day. This may include water; orange juice; lemonade; apple, grape, and cranberry juice; clear fruit drinks; electrolyte replacement and sports drinks; and decaffeinated teas and coffee. If you have been diagnosed with a kidney disease, ask your doctor how much and what types of fluids you should drink to prevent dehydration. If you have kidney disease, drinking too much fluid can cause it build up in the your body and be dangerous to your health.  · Over-the-counter remedies won't shorten the length of the illness but may be helpful for cough, sore throat; and nasal and sinus congestion. Don't use decongestants if you have high blood pressure.  Follow-up care  Follow up with your healthcare provider if you do not improve over the next week.  Call 911  Get emergency medical care if any of the following occur:  · Convulsion  · Feeling weak, dizzy, or like you are going to faint  · Chest pain, shortness of breath, wheezing, or difficulty breathing  When to seek medical advice  Call your healthcare provider right away if any of these occur:  · Cough with lots of colored sputum (mucus) or blood in your sputum  · Chest pain, shortness of breath, wheezing, or difficulty breathing  · Severe headache; face, neck, or ear pain  · Severe, constant pain in the lower right side of your belly (abdominal)  · Continued vomiting (cant keep liquids down)  · Frequent diarrhea (more than 5 times a day); blood (red or black color) or mucus in diarrhea  · Feeling weak, dizzy, or like you are going to faint  · Extreme thirst  · Fever of 100.4°F (38°C) or higher, or as directed by your healthcare provider  Date Last Reviewed: 9/25/2015  © 9899-4929 [employer]. [address] [place] [address], [place] [address]. All rights reserved. This information is not intended  as a substitute for professional medical care. Always follow your healthcare professional's instructions.

## 2018-11-06 NOTE — PROGRESS NOTES
"Subjective:       Patient ID: Janene Greenberg is a 27 y.o. female.    Vitals:  height is 5' 2" (1.575 m) and weight is 53.5 kg (118 lb). Her temperature is 98.6 °F (37 °C). Her blood pressure is 107/76 and her pulse is 99. Her respiration is 18 and oxygen saturation is 96%.     Chief Complaint: Generalized Body Aches    Pt having body aches      Fatigue   This is a new problem. The current episode started today. The problem occurs constantly. The problem has been gradually worsening. Associated symptoms include fatigue. Pertinent negatives include no abdominal pain, chest pain, chills, congestion, coughing, fever, headaches, myalgias, nausea or sore throat. Nothing aggravates the symptoms. She has tried acetaminophen for the symptoms. The treatment provided mild relief.     Review of Systems   Constitution: Positive for fatigue and malaise/fatigue. Negative for chills and fever.   HENT: Negative for congestion, ear pain, hoarse voice and sore throat.    Eyes: Negative for discharge and redness.   Cardiovascular: Negative for chest pain, dyspnea on exertion and leg swelling.   Respiratory: Negative for cough, shortness of breath, sputum production and wheezing.    Musculoskeletal: Negative for myalgias.   Gastrointestinal: Negative for abdominal pain and nausea.   Neurological: Negative for headaches.       Objective:      Physical Exam   Constitutional: She is oriented to person, place, and time. Vital signs are normal. She appears well-developed and well-nourished. She does not appear ill. No distress.   HENT:   Head: Normocephalic and atraumatic.   Right Ear: External ear normal.   Left Ear: External ear normal.   Nose: Nose normal.   Eyes: Conjunctivae, EOM and lids are normal. Right eye exhibits no discharge. Left eye exhibits no discharge.   Neck: Normal range of motion. Neck supple.   Cardiovascular: Normal rate, regular rhythm and normal heart sounds. Exam reveals no gallop and no friction rub.   No murmur " "heard.  Pulmonary/Chest: Effort normal and breath sounds normal. No stridor. No respiratory distress. She has no decreased breath sounds. She has no wheezes. She has no rhonchi. She has no rales.   Musculoskeletal: Normal range of motion.   Neurological: She is alert and oriented to person, place, and time.   Skin: Skin is warm and dry. No rash noted. She is not diaphoretic. No erythema. No pallor.   Psychiatric: She has a normal mood and affect. Her behavior is normal.   Nursing note and vitals reviewed.      Assessment:       1. Viral syndrome        Office Visit on 11/05/2018   Component Date Value Ref Range Status    Rapid Influenza A Ag 11/05/2018 Negative  Negative Final    Rapid Influenza B Ag 11/05/2018 Negative  Negative Final     Acceptable 11/05/2018 Yes   Final     Plan:         Viral syndrome  -     POCT Influenza A/B      Patient Instructions   Take tylenol/motrin as needed for pain/fever.  Rest and stay hydrated.    Please follow up with your primary care provider within 2-5 days if your signs and symptoms have not resolved or worsen.     If your condition worsens or fails to improve we recommend that you receive another evaluation at the emergency room immediately or contact your primary medical clinic to discuss your concerns.   You must understand that you have received an Urgent Care treatment only and that you may be released before all of your medical problems are known or treated. You, the patient, will arrange for follow up care as instructed.         Viral Syndrome (Adult)  A viral illness may cause a number of symptoms. The symptoms depend on the part of the body that the virus affects. If it settles in your nose, throat, and lungs, it may cause cough, sore throat, congestion, and sometimes headache. If it settles in your stomach and intestinal tract, it may cause vomiting and diarrhea. Sometimes it causes vague symptoms like "aching all over," feeling tired, loss of " appetite, or fever.  A viral illness usually lasts 1 to 2 weeks, but sometimes it lasts longer. In some cases, a more serious infection can look like a viral syndrome in the first few days of the illness. You may need another exam and additional tests to know the difference. Watch for the warning signs listed below.  Home care  Follow these guidelines for taking care of yourself at home:  · If symptoms are severe, rest at home for the first 2 to 3 days.  · Stay away from cigarette smoke - both your smoke and the smoke from others.  · You may use over-the-counter acetaminophen or ibuprofen for fever, muscle aching, and headache, unless another medicine was prescribed for this. If you have chronic liver or kidney disease or ever had a stomach ulcer or GI bleeding, talk with your doctor before using these medicines. No one who is younger than 18 and ill with a fever should take aspirin. It may cause severe disease or death.  · Your appetite may be poor, so a light diet is fine. Avoid dehydration by drinking 8 to 12 8-ounce glasses of fluids each day. This may include water; orange juice; lemonade; apple, grape, and cranberry juice; clear fruit drinks; electrolyte replacement and sports drinks; and decaffeinated teas and coffee. If you have been diagnosed with a kidney disease, ask your doctor how much and what types of fluids you should drink to prevent dehydration. If you have kidney disease, drinking too much fluid can cause it build up in the your body and be dangerous to your health.  · Over-the-counter remedies won't shorten the length of the illness but may be helpful for cough, sore throat; and nasal and sinus congestion. Don't use decongestants if you have high blood pressure.  Follow-up care  Follow up with your healthcare provider if you do not improve over the next week.  Call 911  Get emergency medical care if any of the following occur:  · Convulsion  · Feeling weak, dizzy, or like you are going to  faint  · Chest pain, shortness of breath, wheezing, or difficulty breathing  When to seek medical advice  Call your healthcare provider right away if any of these occur:  · Cough with lots of colored sputum (mucus) or blood in your sputum  · Chest pain, shortness of breath, wheezing, or difficulty breathing  · Severe headache; face, neck, or ear pain  · Severe, constant pain in the lower right side of your belly (abdominal)  · Continued vomiting (cant keep liquids down)  · Frequent diarrhea (more than 5 times a day); blood (red or black color) or mucus in diarrhea  · Feeling weak, dizzy, or like you are going to faint  · Extreme thirst  · Fever of 100.4°F (38°C) or higher, or as directed by your healthcare provider  Date Last Reviewed: 9/25/2015  © 0700-3166 [employer]. [address] [place] [address], [place] [address]. All rights reserved. This information is not intended as a substitute for professional medical care. Always follow your healthcare professional's instructions.

## 2018-11-07 ENCOUNTER — OFFICE VISIT (OUTPATIENT)
Dept: URGENT CARE | Facility: CLINIC | Age: 28
End: 2018-11-07
Payer: MEDICAID

## 2018-11-07 VITALS
OXYGEN SATURATION: 97 % | SYSTOLIC BLOOD PRESSURE: 121 MMHG | HEART RATE: 95 BPM | DIASTOLIC BLOOD PRESSURE: 78 MMHG | WEIGHT: 118 LBS | BODY MASS INDEX: 21.71 KG/M2 | RESPIRATION RATE: 16 BRPM | HEIGHT: 62 IN | TEMPERATURE: 98 F

## 2018-11-07 DIAGNOSIS — M54.50 BILATERAL LOW BACK PAIN WITHOUT SCIATICA, UNSPECIFIED CHRONICITY: ICD-10-CM

## 2018-11-07 DIAGNOSIS — N39.0 URINARY TRACT INFECTION WITHOUT HEMATURIA, SITE UNSPECIFIED: Primary | ICD-10-CM

## 2018-11-07 DIAGNOSIS — M54.2 NECK PAIN: ICD-10-CM

## 2018-11-07 LAB
B-HCG UR QL: NEGATIVE
BILIRUB UR QL STRIP: POSITIVE
CTP QC/QA: YES
GLUCOSE UR QL STRIP: NEGATIVE
KETONES UR QL STRIP: NEGATIVE
LEUKOCYTE ESTERASE UR QL STRIP: POSITIVE
PH, POC UA: 6 (ref 5–8)
POC BLOOD, URINE: NEGATIVE
POC NITRATES, URINE: NEGATIVE
PROT UR QL STRIP: NEGATIVE
SP GR UR STRIP: 1.02 (ref 1–1.03)
UROBILINOGEN UR STRIP-ACNC: POSITIVE (ref 0.1–1.1)

## 2018-11-07 PROCEDURE — 81003 URINALYSIS AUTO W/O SCOPE: CPT | Mod: QW,S$GLB,, | Performed by: PHYSICIAN ASSISTANT

## 2018-11-07 PROCEDURE — 81025 URINE PREGNANCY TEST: CPT | Mod: S$GLB,,, | Performed by: PHYSICIAN ASSISTANT

## 2018-11-07 PROCEDURE — 99214 OFFICE O/P EST MOD 30 MIN: CPT | Mod: 25,S$GLB,, | Performed by: PHYSICIAN ASSISTANT

## 2018-11-07 RX ORDER — NITROFURANTOIN 25; 75 MG/1; MG/1
100 CAPSULE ORAL 2 TIMES DAILY
Qty: 10 CAPSULE | Refills: 0 | Status: SHIPPED | OUTPATIENT
Start: 2018-11-07 | End: 2018-11-12

## 2018-11-07 RX ORDER — KETOROLAC TROMETHAMINE 30 MG/ML
30 INJECTION, SOLUTION INTRAMUSCULAR; INTRAVENOUS
Status: COMPLETED | OUTPATIENT
Start: 2018-11-07 | End: 2018-11-07

## 2018-11-07 RX ADMIN — KETOROLAC TROMETHAMINE 30 MG: 30 INJECTION, SOLUTION INTRAMUSCULAR; INTRAVENOUS at 09:11

## 2018-11-07 NOTE — TELEPHONE ENCOUNTER
Patient called clinic discussed her neck pain and back pain that is not improved.  She states she was diagnosed with a urinary tract infection after she had her son and she had the same symptoms plus fever.  She does not have fever at this time.  She does state her urine is darker than usual.  Advised patient to drink more water and return to clinic if she believes she is having a urinary tract infection.  Advised patient to report to the emergency room if she develops fever and neck stiffness to be evaluated for meningitis.

## 2018-11-08 NOTE — PATIENT INSTRUCTIONS
We will call with urine culture results the next 3-7 days.  Please take antibiotic to completion.  Rest and stay hydrated.    Please follow up with your primary care provider within 2-5 days if your signs and symptoms have not resolved or worsen.     If your condition worsens or fails to improve we recommend that you receive another evaluation at the emergency room immediately or contact your primary medical clinic to discuss your concerns.   You must understand that you have received an Urgent Care treatment only and that you may be released before all of your medical problems are known or treated. You, the patient, will arrange for follow up care as instructed.         General Neck and Back Pain    Both neck and back pain are usually caused by injury to the muscles or ligaments of the spine. Sometimes the disks that separate each bone of the spine may cause pain by pressing on a nearby nerve. Back and neck pain may appear after a sudden twisting or bending force (such as in a car accident), or sometimes after a simple awkward movement. In either case, muscle spasm is often present and adds to the pain.  Acute neck and back pain usually gets better in 1 to 2 weeks. Pain related to disk disease, arthritis in the spinal joints or spinal stenosis (narrowing of the spinal canal) can become chronic and last for months or years.  Back and neck pain are common problems. Most people feel better in 1 or 2 weeks, and most of the rest in 1 to 2 months. Most people can remain active.  People experience and describe pain differently.  · Pain can be sharp, stabbing, shooting, aching, cramping, or burning  · Movement, standing, bending, lifting, sitting, or walking may worsen the pain  · Pain can be localized to one spot or area, or it can be more generalized  · Pain can spread or radiate upwards, downwards, to the front, or go down your arms  · Muscle spasm may occur.  Most of the time mechanical problems with the muscles or  spine cause the pain. it is usually caused by an injury, whether known or not, to the muscles or ligaments. While illnesses can cause back pain, it is usually not caused by a serious illness. Pain is usually related to physical activity, whether sports, exercise, work, or normal activity. Sometimes it can occur without an identifiable cause. This can happen simply by stretching or moving wrong, without noting pain at the time. Other causes include:  · Overexertion, lifting, pushing, pulling incorrectly or too aggressively.  · Sudden twisting, bending or stretching from an accident (car or fall), or accidental movement.  · Poor posture  · Poor conditioning, lack of regular exercise  · Spinal disc disease or arthritis  · Stress  · Pregnancy, or illness like appendicitis, bladder or kidney infection, pelvic infections   Home care  · For neck pain: Use a comfortable pillow that supports the head and keeps the spine in a neutral position. The position of the head should not be tilted forward or backward.  · When in bed, try to find a position of comfort. A firm mattress is best. Try lying flat on your back with pillows under your knees. You can also try lying on your side with your knees bent up towards your chest and a pillow between your knees.  · At first, do not try to stretch out the sore spots. If there is a strain, it is not like the good soreness you get after exercising without an injury. In this case, stretching may make it worse.  · Avoid prolonged sitting, long car rides or travel. This puts more stress on the lower back than standing or walking.  · During the first 24 to 72 hours after an injury, apply an ice pack to the painful area for 20 minutes and then remove it for 20 minutes over a period of 60 to 90 minutes or several times a day.   · You can alternate ice and heat therapies. Talk with your healthcare provider about the best treatment for your back or neck pain. As a safety precaution, do not use a  heating pad at bedtime. Sleeping with a heating pad can lead to skin burns or tissue damage.  · Therapeutic massage can help relax the back and neck muscles without stretching them.  · Be aware of safe lifting methods and do not lift anything over 15 pounds until all the pain is gone.  Medications  Talk to your healthcare provider before using medicine, especially if you have other medical problems or are taking other medicines.  · You may use over-the-counter medicine to control pain, unless another pain medicine was prescribed. If you have chronic conditions like diabetes, liver or kidney disease, stomach ulcers,  gastrointestinal bleeding, or are taking blood thinner medicines.  · Be careful if you are given pain medicines, narcotics, or medicine for muscle spasm. They can cause drowsiness, and can affect your coordination, reflexes, and judgment. Do not drive or operate heavy machinery.  Follow-up care  Follow up with your healthcare provider, or as advised. Physical therapy or further tests may be needed.  If X-rays were taken, you will be notified of any new findings that may affect your care.  Call 911  Seek emergency medical care if any of the following occur:  · Trouble breathing  · Confusion  · Very drowsy or trouble awakening  · Fainting or loss of consciousness  · Rapid or very slow heart rate  · Loss of bowel or bladder control  When to seek medical advice  Call your healthcare provider right away if any of these occur:  · Pain becomes worse or spreads into your arms or legs  · Weakness, numbness or pain in one or both arms or legs  · Numbness in the groin area  · Difficulty walking  · Fever of 100.4ºF (38ºC) or higher, or as directed by your healthcare provider  Date Last Reviewed: 7/1/2016 © 2000-2017 [employer]. [address] [place] [address], [place] [address]. All rights reserved. This information is not intended as a substitute for professional medical care. Always follow your healthcare  "professional's instructions.        Bladder Infection, Female (Adult)    Urine is normally doesn't have any bacteria in it. But bacteria can get into the urinary tract from the skin around the rectum. Or they can travel in the blood from elsewhere in the body. Once they are in your urinary tract, they can cause infection in the urethra (urethritis), the bladder (cystitis), or the kidneys (pyelonephritis).  The most common place for an infection is in the bladder. This is called a bladder infection. This is one of the most common infections in women. Most bladder infections are easily treated. They are not serious unless the infection spreads to the kidney.  The phrases "bladder infection," "UTI," and "cystitis" are often used to describe the same thing. But they are not always the same. Cystitis is an inflammation of the bladder. The most common cause of cystitis is an infection.  Symptoms  The infection causes inflammation in the urethra and bladder. This causes many of the symptoms. The most common symptoms of a bladder infection are:  · Pain or burning when urinating  · Having to urinate more often than usual  · Urgent need to urinate  · Only a small amount of urine comes out  · Blood in urine  · Abdominal discomfort. This is usually in the lower abdomen above the pubic bone.  · Cloudy urine  · Strong- or bad-smelling urine  · Unable to urinate (urinary retention)  · Unable to hold urine in (urinary incontinence)  · Fever  · Loss of appetite  · Confusion (in older adults)  Causes  Bladder infections are not contagious. You can't get one from someone else, from a toilet seat, or from sharing a bath.  The most common cause of bladder infections is bacteria from the bowels. The bacteria get onto the skin around the opening of the urethra. From there, they can get into the urine and travel up to the bladder, causing inflammation and infection. This usually happens because of:  · Wiping improperly after urinating. " Always wipe from front to back.  · Bowel incontinence  · Pregnancy  · Procedures such as having a catheter inserted  · Older age  · Not emptying your bladder. This can allow bacteria a chance to grow in your urine.  · Dehydration  · Constipation  · Sex  · Use of a diaphragm for birth control   Treatment  Bladder infections are diagnosed by a urine test. They are treated with antibiotics and usually clear up quickly without complications. Treatment helps prevent a more serious kidney infection.  Medicines  Medicines can help in the treatment of a bladder infection:  · Take antibiotics until they are used up, even if you feel better. It is important to finish them to make sure the infection has cleared.  · You can use acetaminophen or ibuprofen for pain, fever, or discomfort, unless another medicine was prescribed. If you have chronic liver or kidney disease, talk with your healthcare provider before using these medicines. Also talk with your provider if you've ever had a stomach ulcer or gastrointestinal bleeding, or are taking blood-thinner medicines.  · If you are given phenazopydridine to reduce burning with urination, it will cause your urine to become a bright orange color. This can stain clothing.  Care and prevention  These self-care steps can help prevent future infections:  · Drink plenty of fluids to prevent dehydration and flush out your bladder. Do this unless you must restrict fluids for other health reasons, or your doctor told you not to.  · Proper cleaning after going to the bathroom is important. Wipe from front to back after using the toilet to prevent the spread of bacteria.  · Urinate more often. Don't try to hold urine in for a long time.  · Wear loose-fitting clothes and cotton underwear. Avoid tight-fitting pants.  · Improve your diet and prevent constipation. Eat more fresh fruit and vegetables, and fiber, and less junk and fatty foods.  · Avoid sex until your symptoms are gone.  · Avoid  caffeine, alcohol, and spicy foods. These can irritate your bladder.  · Urinate right after intercourse to flush out your bladder.  · If you use birth control pills and have frequent bladder infections, discuss it with your doctor.  Follow-up care  Call your healthcare provider if all symptoms are not gone after 3 days of treatment. This is especially important if you have repeat infections.  If a culture was done, you will be told if your treatment needs to be changed. If directed, you can call to find out the results.  If X-rays were done, you will be told if the results will affect your treatment.  Call 911  Call 911 if any of the following occur:  · Trouble breathing  · Hard to wake up or confusion  · Fainting or loss of consciousness  · Rapid heart rate  When to seek medical advice  Call your healthcare provider right away if any of these occur:  · Fever of 100.4ºF (38.0ºC) or higher, or as directed by your healthcare provider  · Symptoms are not better by the third day of treatment  · Back or belly (abdominal) pain that gets worse  · Repeated vomiting, or unable to keep medicine down  · Weakness or dizziness  · Vaginal discharge  · Pain, redness, or swelling in the outer vaginal area (labia)  Date Last Reviewed: 10/1/2016  © 3766-4476 [employer]. [address] [place] [address], [place] [address]. All rights reserved. This information is not intended as a substitute for professional medical care. Always follow your healthcare professional's instructions.

## 2018-11-08 NOTE — PROGRESS NOTES
"Subjective:       Patient ID: Janene Greenberg is a 27 y.o. female.    Vitals:  height is 5' 2" (1.575 m) and weight is 53.5 kg (118 lb). Her oral temperature is 98 °F (36.7 °C). Her blood pressure is 121/78 and her pulse is 95. Her respiration is 16 and oxygen saturation is 97%.     Chief Complaint: Neck Pain    Pt has been experiencing neck pain since Monday. Her pain now radiates down her neck to her back. Her pain was bringing her to tears. In the past when she had a UTI one of her only symptoms was pain in her back and neck.  She was seen at this clinic 2 days ago and was diagnosed with a viral syndrome. She states the neck pain began radiating down her back after her visit here 2 days ago.       Neck Pain    This is a new problem. The current episode started in the past 7 days (Monday). The problem occurs constantly. The problem has been gradually worsening. The pain is associated with nothing. The quality of the pain is described as shooting. The pain is at a severity of 10/10. The pain is severe. The symptoms are aggravated by position and bending. The pain is worse during the night. Stiffness is present all day. Associated symptoms include headaches. Pertinent negatives include no chest pain or fever. Treatments tried: tylenol. The treatment provided mild relief.     Review of Systems   Constitution: Negative for chills and fever.   HENT: Negative for sore throat.    Eyes: Negative for blurred vision.   Cardiovascular: Negative for chest pain.   Respiratory: Negative for shortness of breath.    Skin: Negative for rash.   Musculoskeletal: Positive for back pain and neck pain. Negative for joint pain.   Gastrointestinal: Negative for abdominal pain, diarrhea, nausea and vomiting.   Neurological: Positive for headaches.   Psychiatric/Behavioral: The patient is not nervous/anxious.        Objective:      Physical Exam   Constitutional: She is oriented to person, place, and time. Vital signs are normal. She appears " well-developed and well-nourished. She does not appear ill. No distress.   HENT:   Head: Normocephalic and atraumatic.   Right Ear: External ear normal.   Left Ear: External ear normal.   Nose: Nose normal.   Eyes: Conjunctivae, EOM and lids are normal. Right eye exhibits no discharge. Left eye exhibits no discharge.   Neck: Normal range of motion. Neck supple. Muscular tenderness (bilateral) present. No spinous process tenderness present. No neck rigidity. No edema, no erythema and normal range of motion (pain increased with extension) present.   Cardiovascular: Normal rate, regular rhythm and normal heart sounds. Exam reveals no gallop and no friction rub.   No murmur heard.  Pulmonary/Chest: Effort normal and breath sounds normal. No stridor. No respiratory distress. She has no decreased breath sounds. She has no wheezes. She has no rhonchi. She has no rales.   Abdominal: Normal appearance. There is no tenderness. There is no rigidity, no rebound, no guarding, no CVA tenderness, no tenderness at McBurney's point and negative Sepulveda's sign.   Musculoskeletal: Normal range of motion.        Cervical back: Normal.        Thoracic back: Normal.        Lumbar back: She exhibits tenderness (bilateral) and pain. She exhibits normal range of motion, no bony tenderness, no swelling, no edema, no deformity, no laceration, no spasm and normal pulse.   Neurological: She is alert and oriented to person, place, and time. She has normal strength. No sensory deficit. She displays a negative Romberg sign.   Negative Kernig and Brudzinski signs   Skin: Skin is warm and dry. No rash noted. She is not diaphoretic. No erythema. No pallor.   Psychiatric: She has a normal mood and affect. Her behavior is normal.   Nursing note and vitals reviewed.      Assessment:       1. Urinary tract infection without hematuria, site unspecified    2. Bilateral low back pain without sciatica, unspecified chronicity    3. Neck pain        Office  Visit on 11/07/2018   Component Date Value Ref Range Status    POC Blood, Urine 11/07/2018 Negative  Negative Final    POC Bilirubin, Urine 11/07/2018 Positive* Negative Final    POC Urobilinogen, Urine 11/07/2018 Positive  0.1 - 1.1 Final    POC Ketones, Urine 11/07/2018 Negative  Negative Final    POC Protein, Urine 11/07/2018 Negative  Negative Final    POC Nitrates, Urine 11/07/2018 Negative  Negative Final    POC Glucose, Urine 11/07/2018 Negative  Negative Final    pH, UA 11/07/2018 6.0  5 - 8 Final    POC Specific Gravity, Urine 11/07/2018 1.025  1.003 - 1.029 Final    POC Leukocytes, Urine 11/07/2018 Positive* Negative Final    POC Preg Test, Ur 11/07/2018 Negative  Negative Final     Acceptable 11/07/2018 Yes   Final     Plan:       Patient states she usually has back pain whenever she has a urinary tract infection and never has dysuria or frequency.  Treating for urinary tract infection and sending for culture.  Treating back pain with Toradol. Er precautions given regarding back and neck pain. No fever and negative Kernig's and Brudzinski's.  No suspicion for meningitis at this time.    Urinary tract infection without hematuria, site unspecified  -     POCT Urinalysis, Dipstick, Automated, W/O Scope  -     POCT urine pregnancy  -     Urine culture    Bilateral low back pain without sciatica, unspecified chronicity  -     ketorolac injection 30 mg    Neck pain  -     ketorolac injection 30 mg      Patient Instructions   We will call with urine culture results the next 3-7 days.  Please take antibiotic to completion.  Rest and stay hydrated.    Please follow up with your primary care provider within 2-5 days if your signs and symptoms have not resolved or worsen.     If your condition worsens or fails to improve we recommend that you receive another evaluation at the emergency room immediately or contact your primary medical clinic to discuss your concerns.   You must understand  that you have received an Urgent Care treatment only and that you may be released before all of your medical problems are known or treated. You, the patient, will arrange for follow up care as instructed.         General Neck and Back Pain    Both neck and back pain are usually caused by injury to the muscles or ligaments of the spine. Sometimes the disks that separate each bone of the spine may cause pain by pressing on a nearby nerve. Back and neck pain may appear after a sudden twisting or bending force (such as in a car accident), or sometimes after a simple awkward movement. In either case, muscle spasm is often present and adds to the pain.  Acute neck and back pain usually gets better in 1 to 2 weeks. Pain related to disk disease, arthritis in the spinal joints or spinal stenosis (narrowing of the spinal canal) can become chronic and last for months or years.  Back and neck pain are common problems. Most people feel better in 1 or 2 weeks, and most of the rest in 1 to 2 months. Most people can remain active.  People experience and describe pain differently.  · Pain can be sharp, stabbing, shooting, aching, cramping, or burning  · Movement, standing, bending, lifting, sitting, or walking may worsen the pain  · Pain can be localized to one spot or area, or it can be more generalized  · Pain can spread or radiate upwards, downwards, to the front, or go down your arms  · Muscle spasm may occur.  Most of the time mechanical problems with the muscles or spine cause the pain. it is usually caused by an injury, whether known or not, to the muscles or ligaments. While illnesses can cause back pain, it is usually not caused by a serious illness. Pain is usually related to physical activity, whether sports, exercise, work, or normal activity. Sometimes it can occur without an identifiable cause. This can happen simply by stretching or moving wrong, without noting pain at the time. Other causes include:  · Overexertion,  lifting, pushing, pulling incorrectly or too aggressively.  · Sudden twisting, bending or stretching from an accident (car or fall), or accidental movement.  · Poor posture  · Poor conditioning, lack of regular exercise  · Spinal disc disease or arthritis  · Stress  · Pregnancy, or illness like appendicitis, bladder or kidney infection, pelvic infections   Home care  · For neck pain: Use a comfortable pillow that supports the head and keeps the spine in a neutral position. The position of the head should not be tilted forward or backward.  · When in bed, try to find a position of comfort. A firm mattress is best. Try lying flat on your back with pillows under your knees. You can also try lying on your side with your knees bent up towards your chest and a pillow between your knees.  · At first, do not try to stretch out the sore spots. If there is a strain, it is not like the good soreness you get after exercising without an injury. In this case, stretching may make it worse.  · Avoid prolonged sitting, long car rides or travel. This puts more stress on the lower back than standing or walking.  · During the first 24 to 72 hours after an injury, apply an ice pack to the painful area for 20 minutes and then remove it for 20 minutes over a period of 60 to 90 minutes or several times a day.   · You can alternate ice and heat therapies. Talk with your healthcare provider about the best treatment for your back or neck pain. As a safety precaution, do not use a heating pad at bedtime. Sleeping with a heating pad can lead to skin burns or tissue damage.  · Therapeutic massage can help relax the back and neck muscles without stretching them.  · Be aware of safe lifting methods and do not lift anything over 15 pounds until all the pain is gone.  Medications  Talk to your healthcare provider before using medicine, especially if you have other medical problems or are taking other medicines.  · You may use over-the-counter  medicine to control pain, unless another pain medicine was prescribed. If you have chronic conditions like diabetes, liver or kidney disease, stomach ulcers,  gastrointestinal bleeding, or are taking blood thinner medicines.  · Be careful if you are given pain medicines, narcotics, or medicine for muscle spasm. They can cause drowsiness, and can affect your coordination, reflexes, and judgment. Do not drive or operate heavy machinery.  Follow-up care  Follow up with your healthcare provider, or as advised. Physical therapy or further tests may be needed.  If X-rays were taken, you will be notified of any new findings that may affect your care.  Call 911  Seek emergency medical care if any of the following occur:  · Trouble breathing  · Confusion  · Very drowsy or trouble awakening  · Fainting or loss of consciousness  · Rapid or very slow heart rate  · Loss of bowel or bladder control  When to seek medical advice  Call your healthcare provider right away if any of these occur:  · Pain becomes worse or spreads into your arms or legs  · Weakness, numbness or pain in one or both arms or legs  · Numbness in the groin area  · Difficulty walking  · Fever of 100.4ºF (38ºC) or higher, or as directed by your healthcare provider  Date Last Reviewed: 7/1/2016  © 7089-1634 [employer]. [address] [place] [address], [place] [id number]. All rights reserved. This information is not intended as a substitute for professional medical care. Always follow your healthcare professional's instructions.        Bladder Infection, Female (Adult)    Urine is normally doesn't have any bacteria in it. But bacteria can get into the urinary tract from the skin around the rectum. Or they can travel in the blood from elsewhere in the body. Once they are in your urinary tract, they can cause infection in the urethra (urethritis), the bladder (cystitis), or the kidneys (pyelonephritis).  The most common place for an infection is in the  "bladder. This is called a bladder infection. This is one of the most common infections in women. Most bladder infections are easily treated. They are not serious unless the infection spreads to the kidney.  The phrases "bladder infection," "UTI," and "cystitis" are often used to describe the same thing. But they are not always the same. Cystitis is an inflammation of the bladder. The most common cause of cystitis is an infection.  Symptoms  The infection causes inflammation in the urethra and bladder. This causes many of the symptoms. The most common symptoms of a bladder infection are:  · Pain or burning when urinating  · Having to urinate more often than usual  · Urgent need to urinate  · Only a small amount of urine comes out  · Blood in urine  · Abdominal discomfort. This is usually in the lower abdomen above the pubic bone.  · Cloudy urine  · Strong- or bad-smelling urine  · Unable to urinate (urinary retention)  · Unable to hold urine in (urinary incontinence)  · Fever  · Loss of appetite  · Confusion (in older adults)  Causes  Bladder infections are not contagious. You can't get one from someone else, from a toilet seat, or from sharing a bath.  The most common cause of bladder infections is bacteria from the bowels. The bacteria get onto the skin around the opening of the urethra. From there, they can get into the urine and travel up to the bladder, causing inflammation and infection. This usually happens because of:  · Wiping improperly after urinating. Always wipe from front to back.  · Bowel incontinence  · Pregnancy  · Procedures such as having a catheter inserted  · Older age  · Not emptying your bladder. This can allow bacteria a chance to grow in your urine.  · Dehydration  · Constipation  · Sex  · Use of a diaphragm for birth control   Treatment  Bladder infections are diagnosed by a urine test. They are treated with antibiotics and usually clear up quickly without complications. Treatment helps " prevent a more serious kidney infection.  Medicines  Medicines can help in the treatment of a bladder infection:  · Take antibiotics until they are used up, even if you feel better. It is important to finish them to make sure the infection has cleared.  · You can use acetaminophen or ibuprofen for pain, fever, or discomfort, unless another medicine was prescribed. If you have chronic liver or kidney disease, talk with your healthcare provider before using these medicines. Also talk with your provider if you've ever had a stomach ulcer or gastrointestinal bleeding, or are taking blood-thinner medicines.  · If you are given phenazopydridine to reduce burning with urination, it will cause your urine to become a bright orange color. This can stain clothing.  Care and prevention  These self-care steps can help prevent future infections:  · Drink plenty of fluids to prevent dehydration and flush out your bladder. Do this unless you must restrict fluids for other health reasons, or your doctor told you not to.  · Proper cleaning after going to the bathroom is important. Wipe from front to back after using the toilet to prevent the spread of bacteria.  · Urinate more often. Don't try to hold urine in for a long time.  · Wear loose-fitting clothes and cotton underwear. Avoid tight-fitting pants.  · Improve your diet and prevent constipation. Eat more fresh fruit and vegetables, and fiber, and less junk and fatty foods.  · Avoid sex until your symptoms are gone.  · Avoid caffeine, alcohol, and spicy foods. These can irritate your bladder.  · Urinate right after intercourse to flush out your bladder.  · If you use birth control pills and have frequent bladder infections, discuss it with your doctor.  Follow-up care  Call your healthcare provider if all symptoms are not gone after 3 days of treatment. This is especially important if you have repeat infections.  If a culture was done, you will be told if your treatment needs to  be changed. If directed, you can call to find out the results.  If X-rays were done, you will be told if the results will affect your treatment.  Call 911  Call 911 if any of the following occur:  · Trouble breathing  · Hard to wake up or confusion  · Fainting or loss of consciousness  · Rapid heart rate  When to seek medical advice  Call your healthcare provider right away if any of these occur:  · Fever of 100.4ºF (38.0ºC) or higher, or as directed by your healthcare provider  · Symptoms are not better by the third day of treatment  · Back or belly (abdominal) pain that gets worse  · Repeated vomiting, or unable to keep medicine down  · Weakness or dizziness  · Vaginal discharge  · Pain, redness, or swelling in the outer vaginal area (labia)  Date Last Reviewed: 10/1/2016  © 6097-4291 [employer] [address] [place] [id number]. All rights reserved. This information is not intended as a substitute for professional medical care. Always follow your healthcare professional's instructions.

## 2018-11-11 LAB
BACTERIA UR CULT: NORMAL
BACTERIA UR CULT: NORMAL

## 2018-12-22 ENCOUNTER — OFFICE VISIT (OUTPATIENT)
Dept: URGENT CARE | Facility: CLINIC | Age: 28
End: 2018-12-22
Payer: MEDICAID

## 2018-12-22 VITALS
SYSTOLIC BLOOD PRESSURE: 108 MMHG | HEIGHT: 62 IN | BODY MASS INDEX: 22.08 KG/M2 | RESPIRATION RATE: 20 BRPM | HEART RATE: 96 BPM | TEMPERATURE: 100 F | OXYGEN SATURATION: 100 % | WEIGHT: 120 LBS | DIASTOLIC BLOOD PRESSURE: 67 MMHG

## 2018-12-22 DIAGNOSIS — J06.9 UPPER RESPIRATORY TRACT INFECTION, UNSPECIFIED TYPE: Primary | ICD-10-CM

## 2018-12-22 PROCEDURE — 99214 OFFICE O/P EST MOD 30 MIN: CPT | Mod: S$GLB,,, | Performed by: PHYSICIAN ASSISTANT

## 2018-12-22 RX ORDER — DEXAMETHASONE SODIUM PHOSPHATE 100 MG/10ML
6 INJECTION INTRAMUSCULAR; INTRAVENOUS
Status: COMPLETED | OUTPATIENT
Start: 2018-12-22 | End: 2018-12-22

## 2018-12-22 RX ADMIN — DEXAMETHASONE SODIUM PHOSPHATE 6 MG: 100 INJECTION INTRAMUSCULAR; INTRAVENOUS at 11:12

## 2018-12-22 NOTE — PATIENT INSTRUCTIONS
-Below are suggestions for symptomatic relief:   -Tylenol every 4 hours OR ibuprofen every 6 hours as needed for pain/fever.   -Salt water gargles to soothe throat pain.   -Chloroseptic spray also helps to numb throat pain.   -Nasal saline spray reduces inflammation and dryness.   -Warm face compresses to help with facial sinus pain/pressure.   -Vicks vapor rub at night.   -Flonase OTC or Nasacort OTC for nasal congestion.   -Simple foods like chicken noodle soup.   -Delsym helps with coughing at night   -Zyrtec/Claritin during the day & Benadryl at night may help with allergies.     If you DO NOT have Hypertension or any history of palpitations, it is ok to take over the counter Sudafed or Mucinex D or Allegra-D or Claritin-D or Zyrtec-D.  If you do take one of the above, it is ok to combine that with plain over the counter Mucinex or Allegra or Claritin or Zyrtec. If, for example, you are taking Zyrtec -D, you can combine that with Mucinex, but not Mucinex-D.  If you are taking Mucinex-D, you can combine that with plain Allegra or Claritin or Zyrtec.   If you DO have Hypertension or palpitations, it is safe to take Coricidin HBP for relief of sinus symptoms.    Please follow up with your primary care provider within 2-5 days if your signs and symptoms have not resolved or worsen.     If your condition worsens or fails to improve we recommend that you receive another evaluation at the emergency room immediately or contact your primary medical clinic to discuss your concerns.   You must understand that you have received an Urgent Care treatment only and that you may be released before all of your medical problems are known or treated. You, the patient, will arrange for follow up care as instructed.

## 2018-12-22 NOTE — PROGRESS NOTES
"Subjective:       Patient ID: Janene Greenberg is a 28 y.o. female.    Vitals:  height is 5' 2" (1.575 m) and weight is 54.4 kg (120 lb). Her tympanic temperature is 99.5 °F (37.5 °C). Her blood pressure is 108/67 and her pulse is 96. Her respiration is 20 and oxygen saturation is 100%.     Chief Complaint: Cough    Cough   This is a new problem. The current episode started in the past 7 days. The problem has been gradually worsening. The problem occurs every few hours. The cough is non-productive. Associated symptoms include a fever, headaches, myalgias, postnasal drip and a sore throat. Pertinent negatives include no chest pain, ear pain, rash or shortness of breath. Nothing aggravates the symptoms. Treatments tried: emergenC, advil, multi vitamin.       Constitution: Positive for activity change, appetite change, sweating and fever.   HENT: Positive for postnasal drip and sore throat. Negative for ear pain, congestion, sinus pain and sinus pressure.    Neck: Negative for neck pain.   Cardiovascular: Negative for chest pain.   Eyes: Negative for eye pain.   Respiratory: Positive for cough. Negative for sputum production and shortness of breath.    Gastrointestinal: Negative for nausea, vomiting and diarrhea.   Genitourinary: Negative for dysuria.   Musculoskeletal: Positive for muscle ache.   Skin: Negative for rash and erythema.   Neurological: Positive for headaches.       Objective:      Physical Exam   Constitutional: She is oriented to person, place, and time. Vital signs are normal. She appears well-developed and well-nourished. She does not appear ill. No distress.   HENT:   Head: Normocephalic and atraumatic.   Right Ear: Hearing, tympanic membrane, external ear and ear canal normal.   Left Ear: Hearing, tympanic membrane, external ear and ear canal normal.   Nose: Nose normal. No mucosal edema. Right sinus exhibits no maxillary sinus tenderness and no frontal sinus tenderness. Left sinus exhibits no maxillary " sinus tenderness and no frontal sinus tenderness.   Mouth/Throat: Uvula is midline. Posterior oropharyngeal erythema (cobblestoning) present. No oropharyngeal exudate or posterior oropharyngeal edema.   Eyes: Conjunctivae, EOM and lids are normal. Right eye exhibits no discharge. Left eye exhibits no discharge.   Neck: Normal range of motion. Neck supple.   Cardiovascular: Normal rate, regular rhythm and normal heart sounds. Exam reveals no gallop and no friction rub.   No murmur heard.  Pulmonary/Chest: Effort normal and breath sounds normal. No stridor. No respiratory distress. She has no decreased breath sounds. She has no wheezes. She has no rhonchi. She has no rales.   Musculoskeletal: Normal range of motion.   Lymphadenopathy:        Head (right side): No submandibular and no tonsillar adenopathy present.        Head (left side): No submandibular and no tonsillar adenopathy present.   Neurological: She is alert and oriented to person, place, and time.   Skin: Skin is warm and dry. No rash noted. She is not diaphoretic. No erythema. No pallor.   Psychiatric: She has a normal mood and affect. Her behavior is normal.   Nursing note and vitals reviewed.      Assessment:       1. Upper respiratory tract infection, unspecified type        Plan:         Upper respiratory tract infection, unspecified type  -     dexamethasone injection 6 mg      Patient Instructions   -Below are suggestions for symptomatic relief:   -Tylenol every 4 hours OR ibuprofen every 6 hours as needed for pain/fever.   -Salt water gargles to soothe throat pain.   -Chloroseptic spray also helps to numb throat pain.   -Nasal saline spray reduces inflammation and dryness.   -Warm face compresses to help with facial sinus pain/pressure.   -Vicks vapor rub at night.   -Flonase OTC or Nasacort OTC for nasal congestion.   -Simple foods like chicken noodle soup.   -Delsym helps with coughing at night   -Zyrtec/Claritin during the day & Benadryl at  night may help with allergies.     If you DO NOT have Hypertension or any history of palpitations, it is ok to take over the counter Sudafed or Mucinex D or Allegra-D or Claritin-D or Zyrtec-D.  If you do take one of the above, it is ok to combine that with plain over the counter Mucinex or Allegra or Claritin or Zyrtec. If, for example, you are taking Zyrtec -D, you can combine that with Mucinex, but not Mucinex-D.  If you are taking Mucinex-D, you can combine that with plain Allegra or Claritin or Zyrtec.   If you DO have Hypertension or palpitations, it is safe to take Coricidin HBP for relief of sinus symptoms.    Please follow up with your primary care provider within 2-5 days if your signs and symptoms have not resolved or worsen.     If your condition worsens or fails to improve we recommend that you receive another evaluation at the emergency room immediately or contact your primary medical clinic to discuss your concerns.   You must understand that you have received an Urgent Care treatment only and that you may be released before all of your medical problems are known or treated. You, the patient, will arrange for follow up care as instructed.

## 2019-07-11 ENCOUNTER — HOSPITAL ENCOUNTER (EMERGENCY)
Facility: HOSPITAL | Age: 29
Discharge: HOME | End: 2019-07-11
Attending: EMERGENCY MEDICINE
Payer: MEDICAID

## 2019-07-11 ENCOUNTER — OFFICE VISIT (OUTPATIENT)
Dept: URGENT CARE | Facility: CLINIC | Age: 29
End: 2019-07-11
Payer: MEDICAID

## 2019-07-11 ENCOUNTER — TELEPHONE (OUTPATIENT)
Dept: URGENT CARE | Facility: CLINIC | Age: 29
End: 2019-07-11

## 2019-07-11 VITALS
DIASTOLIC BLOOD PRESSURE: 93 MMHG | HEIGHT: 62 IN | HEART RATE: 83 BPM | BODY MASS INDEX: 22.08 KG/M2 | SYSTOLIC BLOOD PRESSURE: 132 MMHG | OXYGEN SATURATION: 99 % | RESPIRATION RATE: 17 BRPM | WEIGHT: 120 LBS

## 2019-07-11 VITALS
WEIGHT: 126 LBS | TEMPERATURE: 98 F | DIASTOLIC BLOOD PRESSURE: 64 MMHG | HEART RATE: 80 BPM | OXYGEN SATURATION: 98 % | BODY MASS INDEX: 23.05 KG/M2 | SYSTOLIC BLOOD PRESSURE: 117 MMHG | RESPIRATION RATE: 16 BRPM

## 2019-07-11 DIAGNOSIS — G43.909 MIGRAINE WITHOUT STATUS MIGRAINOSUS, NOT INTRACTABLE, UNSPECIFIED MIGRAINE TYPE: Primary | ICD-10-CM

## 2019-07-11 DIAGNOSIS — R51.9 ACUTE NONINTRACTABLE HEADACHE, UNSPECIFIED HEADACHE TYPE: Primary | ICD-10-CM

## 2019-07-11 LAB
AMPHET+METHAMPHET UR QL: NEGATIVE
B-HCG UR QL: NEGATIVE
BARBITURATES UR QL SCN>200 NG/ML: NEGATIVE
BENZODIAZ UR QL SCN>200 NG/ML: NEGATIVE
BILIRUB UR QL STRIP: NEGATIVE
BZE UR QL SCN: NEGATIVE
CANNABINOIDS UR QL SCN: NEGATIVE
CLARITY UR: CLEAR
COLOR UR: YELLOW
CREAT UR-MCNC: 108.1 MG/DL (ref 15–325)
CTP QC/QA: YES
GLUCOSE UR QL STRIP: NEGATIVE
HGB UR QL STRIP: NEGATIVE
KETONES UR QL STRIP: NEGATIVE
LEUKOCYTE ESTERASE UR QL STRIP: NEGATIVE
METHADONE UR QL SCN>300 NG/ML: NEGATIVE
NITRITE UR QL STRIP: NEGATIVE
OPIATES UR QL SCN: NEGATIVE
PCP UR QL SCN>25 NG/ML: NEGATIVE
PH UR STRIP: 7 [PH] (ref 5–8)
PROT UR QL STRIP: NEGATIVE
SP GR UR STRIP: 1.02 (ref 1–1.03)
TOXICOLOGY INFORMATION: NORMAL
URN SPEC COLLECT METH UR: NORMAL
UROBILINOGEN UR STRIP-ACNC: NEGATIVE EU/DL

## 2019-07-11 PROCEDURE — 96374 THER/PROPH/DIAG INJ IV PUSH: CPT

## 2019-07-11 PROCEDURE — 81003 URINALYSIS AUTO W/O SCOPE: CPT | Mod: 59

## 2019-07-11 PROCEDURE — 99214 OFFICE O/P EST MOD 30 MIN: CPT | Mod: S$GLB,,, | Performed by: FAMILY MEDICINE

## 2019-07-11 PROCEDURE — 80307 DRUG TEST PRSMV CHEM ANLYZR: CPT

## 2019-07-11 PROCEDURE — 81025 URINE PREGNANCY TEST: CPT | Performed by: EMERGENCY MEDICINE

## 2019-07-11 PROCEDURE — 99214 PR OFFICE/OUTPT VISIT, EST, LEVL IV, 30-39 MIN: ICD-10-PCS | Mod: S$GLB,,, | Performed by: FAMILY MEDICINE

## 2019-07-11 PROCEDURE — 96375 TX/PRO/DX INJ NEW DRUG ADDON: CPT

## 2019-07-11 PROCEDURE — 63600175 PHARM REV CODE 636 W HCPCS: Performed by: EMERGENCY MEDICINE

## 2019-07-11 PROCEDURE — 99284 EMERGENCY DEPT VISIT MOD MDM: CPT | Mod: 25

## 2019-07-11 RX ORDER — ONDANSETRON 4 MG/1
4 TABLET, ORALLY DISINTEGRATING ORAL EVERY 6 HOURS PRN
Qty: 15 TABLET | Refills: 0 | Status: SHIPPED | OUTPATIENT
Start: 2019-07-11 | End: 2021-09-16

## 2019-07-11 RX ORDER — HYDROCODONE BITARTRATE AND ACETAMINOPHEN 5; 325 MG/1; MG/1
1 TABLET ORAL EVERY 8 HOURS PRN
Qty: 12 TABLET | Refills: 0 | Status: SHIPPED | OUTPATIENT
Start: 2019-07-11 | End: 2021-09-16

## 2019-07-11 RX ORDER — DEXAMETHASONE SODIUM PHOSPHATE 4 MG/ML
8 INJECTION, SOLUTION INTRA-ARTICULAR; INTRALESIONAL; INTRAMUSCULAR; INTRAVENOUS; SOFT TISSUE
Status: COMPLETED | OUTPATIENT
Start: 2019-07-11 | End: 2019-07-11

## 2019-07-11 RX ORDER — KETOROLAC TROMETHAMINE 30 MG/ML
60 INJECTION, SOLUTION INTRAMUSCULAR; INTRAVENOUS
Status: COMPLETED | OUTPATIENT
Start: 2019-07-11 | End: 2019-07-11

## 2019-07-11 RX ORDER — ONDANSETRON 4 MG/1
4 TABLET, ORALLY DISINTEGRATING ORAL
Status: COMPLETED | OUTPATIENT
Start: 2019-07-11 | End: 2019-07-11

## 2019-07-11 RX ORDER — BUTALBITAL, ACETAMINOPHEN AND CAFFEINE 50; 325; 40 MG/1; MG/1; MG/1
1 TABLET ORAL EVERY 6 HOURS PRN
Qty: 14 TABLET | Refills: 0 | Status: SHIPPED | OUTPATIENT
Start: 2019-07-11 | End: 2019-08-10

## 2019-07-11 RX ORDER — PROCHLORPERAZINE MALEATE 10 MG
10 TABLET ORAL EVERY 8 HOURS PRN
Qty: 14 TABLET | Refills: 0 | Status: SHIPPED | OUTPATIENT
Start: 2019-07-11 | End: 2021-09-16

## 2019-07-11 RX ORDER — HALOPERIDOL 5 MG/ML
5 INJECTION INTRAMUSCULAR
Status: COMPLETED | OUTPATIENT
Start: 2019-07-11 | End: 2019-07-11

## 2019-07-11 RX ORDER — KETOROLAC TROMETHAMINE 30 MG/ML
15 INJECTION, SOLUTION INTRAMUSCULAR; INTRAVENOUS
Status: DISCONTINUED | OUTPATIENT
Start: 2019-07-11 | End: 2019-07-11

## 2019-07-11 RX ORDER — KETOROLAC TROMETHAMINE 10 MG/1
TABLET, FILM COATED ORAL
Qty: 20 TABLET | Refills: 0 | Status: SHIPPED | OUTPATIENT
Start: 2019-07-11 | End: 2021-09-16

## 2019-07-11 RX ADMIN — ONDANSETRON 4 MG: 4 TABLET, ORALLY DISINTEGRATING ORAL at 05:07

## 2019-07-11 RX ADMIN — KETOROLAC TROMETHAMINE 60 MG: 30 INJECTION, SOLUTION INTRAMUSCULAR; INTRAVENOUS at 05:07

## 2019-07-11 RX ADMIN — HALOPERIDOL LACTATE 5 MG: 5 INJECTION, SOLUTION INTRAMUSCULAR at 06:07

## 2019-07-11 RX ADMIN — DEXAMETHASONE SODIUM PHOSPHATE 8 MG: 4 INJECTION, SOLUTION INTRAMUSCULAR; INTRAVENOUS at 06:07

## 2019-07-11 NOTE — PATIENT INSTRUCTIONS
What Are Migraine and Tension Headaches?    Although there are several types of headaches, migraine and tension headaches affect the most people. When you have a headache, it isn't your brain that's hurting. Your head aches because nerves in the bones, blood vessels, meninges, and muscles of your head are irritated. These irritated nerves send pain signals to the brain, which identifies where you hurt and how bad the pain is.  Talk with your healthcare provider about a treatment plan that may help relieve pain and prevent future headaches.   What causes your headache?  The actual headache process is not yet understood. Only rarely are headaches a sign of a serious medical problem. Headache pain may be caused by abnormal interaction between the brain and the nerves and blood vessels in the head. Environmental stresses or certain foods and drinks may trigger headache pain.  What is referred pain?  Headache pain can be referred pain, which is pain that has its source in one place but is felt in another. For example, pain behind the eyes may actually be caused by tense muscles in the neck and shoulders. This means that the place that hurts may not be the part of the body that needs treatment.  Is it a migraine?  Migraine is a vascular headache that causes throbbing pain felt on one or both sides of the head. You may feel nauseated or vomit. This headache may also be preceded or associated with changes in sight (like seeing spots or flashes of light), ability to speak, or sensation (aura). There are a wide variety of environmental and food-related triggers for migraines. The pain may last for 4 to 72 hours. Afterward, you may feel shaky for a day or so. If this is the first time you experience these symptoms, you should immediately seek medical attention because you could be having a stroke.  Is it a tension headache?  This type of headache is usually a dull ache or a sensation of pressure on both sides of the head. It  "may be associated with pain or tension in the neck and shoulders. Depression, anxiety, and stress can cause a tension headache. The pain may not have a definite beginning or end. It may come and go, or seem never to go away.  When to call the healthcare provider  Call your healthcare provider for headaches that happen along with any of these symptoms:  · Sudden, severe headache that is different from your usual headache pain  · High fever along with a stiff neck  · Recurring headache in children   · Ongoing numbness or muscle weakness  · Loss of vision  · Pain following a head injury  · Convulsions, or a change in mental awareness  · A headache you would call "the worst headache you've ever had"   Date Last Reviewed: 9/14/2015  © 3640-0855 [employer]. [address] [place] [address], [place] [address]. All rights reserved. This information is not intended as a substitute for professional medical care. Always follow your healthcare professional's instructions.        "

## 2019-07-11 NOTE — ED PROVIDER NOTES
Encounter Date: 7/11/2019       History     Chief Complaint   Patient presents with    Headache     reports left sided headache that started at 1400 today. reports nausea, blurry vision, and photophobia. reports was seen at urgent care earlier with similiar symptoms but reports no improvement. reports numbness in right hand at that time but reports lasted an hour and has resolved.      28-year-old female presents to the emergency department complaining of headache.  Reports onset 3 or 4 hr ago.  Reports a constant, aching throbbing left-sided headache. It began as a moderate headache and worsened over the next hour or 2. Pain is constant, worse with bright lights and loud noises.  She reports she gets some occasional blurry vision that resolved after blinking as well as nausea but has not vomited.  Similar to some severe headaches she has had in the past.  Reports some tingling in her left hand earlier today that subsequently resolved.  She was seen in urgent care and given Toradol and some Norco but states her pain is persistent.  Denies any fever or neck pain. No other symptoms reported at this time.        Review of patient's allergies indicates:  No Known Allergies  Past Medical History:   Diagnosis Date    Abnormal Pap smear of cervix 06/2014    colpo done no bx bc was pregnant    Depression     1st pregnancy    HPV in female      History reviewed. No pertinent surgical history.  Family History   Problem Relation Age of Onset    Breast cancer Maternal Grandmother     Cancer Father         lung    Colon cancer Neg Hx     Ovarian cancer Neg Hx      Social History     Tobacco Use    Smoking status: Never Smoker    Smokeless tobacco: Never Used   Substance Use Topics    Alcohol use: No    Drug use: No     Review of Systems   Constitutional: Negative for chills, fatigue and fever.   HENT: Negative for congestion, sore throat and voice change.    Eyes: Positive for photophobia. Negative for pain and  redness.   Respiratory: Negative for cough, choking and shortness of breath.    Cardiovascular: Negative for chest pain, palpitations and leg swelling.   Gastrointestinal: Positive for nausea. Negative for abdominal pain, diarrhea and vomiting.   Genitourinary: Negative for dysuria, frequency and urgency.   Musculoskeletal: Negative for back pain, neck pain and neck stiffness.   Neurological: Positive for headaches. Negative for light-headedness and numbness.   All other systems reviewed and are negative.      Physical Exam     Initial Vitals [07/11/19 1755]   BP Pulse Resp Temp SpO2   (!) 163/91 96 20 98.1 °F (36.7 °C) 97 %      MAP       --         Physical Exam    Nursing note and vitals reviewed.  Constitutional: She appears well-developed and well-nourished. She appears distressed (Mild discomfort).   HENT:   Head: Normocephalic and atraumatic.   Oropharynx clear; Dry MM   Eyes: Conjunctivae and EOM are normal. Pupils are equal, round, and reactive to light.   Neck: Normal range of motion. Neck supple. No tracheal deviation present.   Cardiovascular: Normal rate, regular rhythm, normal heart sounds and intact distal pulses.   Pulmonary/Chest: Breath sounds normal. No respiratory distress. She has no wheezes. She has no rhonchi. She has no rales.   Abdominal: Soft. Bowel sounds are normal. She exhibits no distension. There is no tenderness.   Musculoskeletal: Normal range of motion. She exhibits no edema or tenderness.   Neurological: She is alert and oriented to person, place, and time. She has normal strength. No cranial nerve deficit.   Skin: Skin is warm and dry. Capillary refill takes less than 2 seconds.         ED Course   Procedures  Labs Reviewed   URINALYSIS   DRUG SCREEN PANEL, URINE EMERGENCY   POCT URINE PREGNANCY          Imaging Results    None          Medical Decision Making:   Initial Assessment:   28-year-old female presents to the emergency department complaining of left-sided headache for a  few hours  Differential Diagnosis:   ICH, SAH, CVA, migraine versus tension versus cluster headache  Clinical Tests:   Lab Tests: Reviewed       <> Summary of Lab: UPT negative, UA within normal limits  ED Management:  Patient given IM Decadron and Haldol with significant improvement in her symptoms. Informed her of results as well as plan to discharge with prescriptions for Fioricet and Compazine.  She did not report any abrupt/thunderclap onset, nor does she report that this is the worst headache in her life and her symptoms were relatively easy to manage.  Instructed her to follow up promptly with her primary care physician early next week, on home management, reasons to return to the emergency room.  Patient and family expressed good understanding and are comfortable with discharge at this time.                      Clinical Impression:       ICD-10-CM ICD-9-CM   1. Acute nonintractable headache, unspecified headache type R51 784.0         Disposition:   Disposition: Discharged  Condition: Stable                        Aleksandar Suh MD  07/11/19 7801

## 2019-07-11 NOTE — ED NOTES
Pt has migraine that started this morning. Was seen at urgent care and was given zofran and a perscription for NORCo. Pt having nausea. Describes headache as the worst ever and throbbing

## 2019-07-11 NOTE — PROGRESS NOTES
"Subjective:       Patient ID: Janene Greenberg is a 28 y.o. female.    Vitals:  height is 5' 2" (1.575 m) and weight is 54.4 kg (120 lb). Her blood pressure is 132/93 (abnormal) and her pulse is 83. Her respiration is 17 and oxygen saturation is 99%.     Chief Complaint: Migraine    Pt appears confused and is struggling to find words when communicating. Pt reports that her right hand is numb and that is struggling to keep her balance on the right side.States having pain on left sided of temple and associated with photophobia, minimal nausea, no vomiting, rarely gets migraine attack,  On no medication.    Migraine    This is a recurrent problem. The current episode started today. The problem occurs constantly. The problem has been gradually worsening. The pain is located in the left unilateral region. The pain does not radiate. The pain quality is not similar to prior headaches. The quality of the pain is described as aching. The pain is at a severity of 10/10. The pain is moderate. Associated symptoms include blurred vision, dizziness, eye redness, eye watering, facial sweating, a loss of balance, nausea, numbness, photophobia, rhinorrhea and scalp tenderness. Pertinent negatives include no abdominal pain, abnormal behavior, anorexia, back pain, coughing, drainage, ear pain, eye pain, fever, hearing loss, insomnia, muscle aches, neck pain, phonophobia, seizures, sinus pressure, sore throat, swollen glands, tingling, tinnitus, visual change, vomiting, weakness or weight loss. The symptoms are aggravated by activity. She has tried nothing for the symptoms. The treatment provided no relief. Her past medical history is significant for migraine headaches. There is no history of cancer, cluster headaches, hypertension, immunosuppression, migraines in the family, obesity, pseudotumor cerebri, recent head traumas, sinus disease or TMJ.       Constitution: Negative for chills, fatigue and fever.   HENT: Negative for ear pain, " tinnitus, hearing loss, congestion, sinus pressure and sore throat.    Neck: Negative for neck pain and painful lymph nodes.   Cardiovascular: Negative for chest pain and leg swelling.   Eyes: Positive for eye redness, photophobia and blurred vision. Negative for eye pain and double vision.   Respiratory: Negative for cough and shortness of breath.    Gastrointestinal: Positive for nausea. Negative for abdominal pain, vomiting and diarrhea.   Genitourinary: Negative for dysuria, frequency, urgency and history of kidney stones.   Musculoskeletal: Negative for joint pain, joint swelling, back pain, muscle cramps and muscle ache.   Skin: Negative for color change, pale, rash and bruising.   Allergic/Immunologic: Negative for seasonal allergies.   Neurological: Positive for dizziness, loss of balance, headaches, history of migraines and numbness. Negative for history of vertigo, light-headedness, passing out and seizures.   Hematologic/Lymphatic: Negative for swollen lymph nodes.   Psychiatric/Behavioral: Negative for nervous/anxious, sleep disturbance and depression. The patient is not nervous/anxious and does not have insomnia.        Objective:      Physical Exam   Constitutional: She is oriented to person, place, and time. She appears well-developed and well-nourished. She is cooperative.  Non-toxic appearance. She does not appear ill. No distress.   HENT:   Head: Normocephalic and atraumatic.   Right Ear: Hearing, tympanic membrane, external ear and ear canal normal.   Left Ear: Hearing, tympanic membrane, external ear and ear canal normal.   Nose: Nose normal. No mucosal edema, rhinorrhea or nasal deformity. No epistaxis. Right sinus exhibits no maxillary sinus tenderness and no frontal sinus tenderness. Left sinus exhibits no maxillary sinus tenderness and no frontal sinus tenderness.   Mouth/Throat: Uvula is midline, oropharynx is clear and moist and mucous membranes are normal. No trismus in the jaw. Normal  dentition. No uvula swelling. No posterior oropharyngeal erythema.   Eyes: Conjunctivae and lids are normal. Right eye exhibits no discharge. Left eye exhibits no discharge. No scleral icterus.   Sclera clear bilat   Neck: Trachea normal, normal range of motion, full passive range of motion without pain and phonation normal. Neck supple.   Cardiovascular: Normal rate, regular rhythm, normal heart sounds, intact distal pulses and normal pulses.   Pulmonary/Chest: Effort normal and breath sounds normal. No respiratory distress.   Abdominal: Soft. Normal appearance and bowel sounds are normal. She exhibits no distension, no pulsatile midline mass and no mass. There is no tenderness.   Musculoskeletal: Normal range of motion. She exhibits no edema or deformity.   Neurological: She is alert and oriented to person, place, and time. She displays normal reflexes. No cranial nerve deficit or sensory deficit. She exhibits normal muscle tone. Coordination normal.   Skin: Skin is warm, dry and intact. She is not diaphoretic. No pallor.   Psychiatric: She has a normal mood and affect. Her speech is normal and behavior is normal. Judgment and thought content normal. Cognition and memory are normal.   Nursing note and vitals reviewed.      Assessment:       1. Migraine without status migrainosus, not intractable, unspecified migraine type        Plan:         Migraine without status migrainosus, not intractable, unspecified migraine type    Other orders  -     ketorolac (TORADOL) 10 mg tablet; Take one po q 8 hrs prn pain  Dispense: 20 tablet; Refill: 0  -     HYDROcodone-acetaminophen (NORCO) 5-325 mg per tablet; Take 1 tablet by mouth every 8 (eight) hours as needed for Pain.  Dispense: 12 tablet; Refill: 0        If headache persists go to ER

## 2019-07-12 NOTE — TELEPHONE ENCOUNTER
I called the pt at approximately 8:11 PM to see if there had been any alleviation of symptoms.  Pt did not respond so I left a message instructing her to call our clinic back.

## 2021-01-12 ENCOUNTER — LAB VISIT (OUTPATIENT)
Dept: PRIMARY CARE CLINIC | Facility: OTHER | Age: 31
End: 2021-01-12
Attending: INTERNAL MEDICINE
Payer: MEDICAID

## 2021-01-12 DIAGNOSIS — Z20.822 ENCOUNTER FOR LABORATORY TESTING FOR COVID-19 VIRUS: ICD-10-CM

## 2021-01-12 PROCEDURE — U0003 INFECTIOUS AGENT DETECTION BY NUCLEIC ACID (DNA OR RNA); SEVERE ACUTE RESPIRATORY SYNDROME CORONAVIRUS 2 (SARS-COV-2) (CORONAVIRUS DISEASE [COVID-19]), AMPLIFIED PROBE TECHNIQUE, MAKING USE OF HIGH THROUGHPUT TECHNOLOGIES AS DESCRIBED BY CMS-2020-01-R: HCPCS

## 2021-01-14 LAB — SARS-COV-2 RNA RESP QL NAA+PROBE: NOT DETECTED

## 2021-04-16 ENCOUNTER — PATIENT MESSAGE (OUTPATIENT)
Dept: RESEARCH | Facility: HOSPITAL | Age: 31
End: 2021-04-16

## 2021-04-21 ENCOUNTER — TELEPHONE (OUTPATIENT)
Dept: OBSTETRICS AND GYNECOLOGY | Facility: CLINIC | Age: 31
End: 2021-04-21

## 2021-04-22 ENCOUNTER — OFFICE VISIT (OUTPATIENT)
Dept: OBSTETRICS AND GYNECOLOGY | Facility: CLINIC | Age: 31
End: 2021-04-22
Payer: MEDICAID

## 2021-04-22 ENCOUNTER — LAB VISIT (OUTPATIENT)
Dept: LAB | Facility: OTHER | Age: 31
End: 2021-04-22
Attending: NURSE PRACTITIONER
Payer: MEDICAID

## 2021-04-22 VITALS
WEIGHT: 123.44 LBS | HEIGHT: 62 IN | DIASTOLIC BLOOD PRESSURE: 70 MMHG | BODY MASS INDEX: 22.71 KG/M2 | SYSTOLIC BLOOD PRESSURE: 114 MMHG

## 2021-04-22 DIAGNOSIS — Z11.3 SCREEN FOR STD (SEXUALLY TRANSMITTED DISEASE): ICD-10-CM

## 2021-04-22 DIAGNOSIS — R10.2 PELVIC PAIN: ICD-10-CM

## 2021-04-22 DIAGNOSIS — Z30.014 ENCOUNTER FOR INITIAL PRESCRIPTION OF INTRAUTERINE CONTRACEPTIVE DEVICE (IUD): ICD-10-CM

## 2021-04-22 DIAGNOSIS — N89.8 VAGINAL DISCHARGE: ICD-10-CM

## 2021-04-22 DIAGNOSIS — Z30.431 IUD CHECK UP: Primary | ICD-10-CM

## 2021-04-22 PROCEDURE — 87591 N.GONORRHOEAE DNA AMP PROB: CPT | Performed by: NURSE PRACTITIONER

## 2021-04-22 PROCEDURE — 87491 CHLMYD TRACH DNA AMP PROBE: CPT | Performed by: NURSE PRACTITIONER

## 2021-04-22 PROCEDURE — 86592 SYPHILIS TEST NON-TREP QUAL: CPT | Performed by: NURSE PRACTITIONER

## 2021-04-22 PROCEDURE — 86703 HIV-1/HIV-2 1 RESULT ANTBDY: CPT | Performed by: NURSE PRACTITIONER

## 2021-04-22 PROCEDURE — 99999 PR PBB SHADOW E&M-EST. PATIENT-LVL III: ICD-10-PCS | Mod: PBBFAC,,, | Performed by: NURSE PRACTITIONER

## 2021-04-22 PROCEDURE — 87660 TRICHOMONAS VAGIN DIR PROBE: CPT | Performed by: NURSE PRACTITIONER

## 2021-04-22 PROCEDURE — 99213 PR OFFICE/OUTPT VISIT, EST, LEVL III, 20-29 MIN: ICD-10-PCS | Mod: S$PBB,,, | Performed by: NURSE PRACTITIONER

## 2021-04-22 PROCEDURE — 99213 OFFICE O/P EST LOW 20 MIN: CPT | Mod: PBBFAC | Performed by: NURSE PRACTITIONER

## 2021-04-22 PROCEDURE — 99999 PR PBB SHADOW E&M-EST. PATIENT-LVL III: CPT | Mod: PBBFAC,,, | Performed by: NURSE PRACTITIONER

## 2021-04-22 PROCEDURE — 36415 COLL VENOUS BLD VENIPUNCTURE: CPT | Performed by: NURSE PRACTITIONER

## 2021-04-22 PROCEDURE — 80074 ACUTE HEPATITIS PANEL: CPT | Performed by: NURSE PRACTITIONER

## 2021-04-22 PROCEDURE — 99213 OFFICE O/P EST LOW 20 MIN: CPT | Mod: S$PBB,,, | Performed by: NURSE PRACTITIONER

## 2021-04-22 PROCEDURE — 87510 GARDNER VAG DNA DIR PROBE: CPT | Performed by: NURSE PRACTITIONER

## 2021-04-22 RX ORDER — METRONIDAZOLE 500 MG/1
500 TABLET ORAL 2 TIMES DAILY
Qty: 14 TABLET | Refills: 0 | Status: SHIPPED | OUTPATIENT
Start: 2021-04-22 | End: 2021-04-29

## 2021-04-23 LAB
C TRACH DNA SPEC QL NAA+PROBE: NOT DETECTED
CANDIDA RRNA VAG QL PROBE: NEGATIVE
G VAGINALIS RRNA GENITAL QL PROBE: POSITIVE
HAV IGM SERPL QL IA: NEGATIVE
HBV CORE IGM SERPL QL IA: NEGATIVE
HBV SURFACE AG SERPL QL IA: NEGATIVE
HCV AB SERPL QL IA: NEGATIVE
HIV 1+2 AB+HIV1 P24 AG SERPL QL IA: NEGATIVE
N GONORRHOEA DNA SPEC QL NAA+PROBE: NOT DETECTED
RPR SER QL: NORMAL
T VAGINALIS RRNA GENITAL QL PROBE: NEGATIVE

## 2021-04-24 ENCOUNTER — HOSPITAL ENCOUNTER (OUTPATIENT)
Dept: RADIOLOGY | Facility: OTHER | Age: 31
Discharge: HOME | End: 2021-04-24
Attending: NURSE PRACTITIONER
Payer: MEDICAID

## 2021-04-24 DIAGNOSIS — R10.2 PELVIC PAIN: ICD-10-CM

## 2021-04-24 DIAGNOSIS — Z30.431 IUD CHECK UP: ICD-10-CM

## 2021-04-24 PROCEDURE — 76830 US PELVIS COMP WITH TRANSVAG NON-OB (XPD): ICD-10-PCS | Mod: 26,,, | Performed by: RADIOLOGY

## 2021-04-24 PROCEDURE — 76856 US EXAM PELVIC COMPLETE: CPT | Mod: 26,,, | Performed by: RADIOLOGY

## 2021-04-24 PROCEDURE — 76856 US EXAM PELVIC COMPLETE: CPT | Mod: TC

## 2021-04-24 PROCEDURE — 76830 TRANSVAGINAL US NON-OB: CPT | Mod: 26,,, | Performed by: RADIOLOGY

## 2021-04-24 PROCEDURE — 76856 US PELVIS COMP WITH TRANSVAG NON-OB (XPD): ICD-10-PCS | Mod: 26,,, | Performed by: RADIOLOGY

## 2021-04-28 ENCOUNTER — TELEPHONE (OUTPATIENT)
Dept: OBSTETRICS AND GYNECOLOGY | Facility: CLINIC | Age: 31
End: 2021-04-28

## 2021-05-03 ENCOUNTER — TELEPHONE (OUTPATIENT)
Dept: OBSTETRICS AND GYNECOLOGY | Facility: CLINIC | Age: 31
End: 2021-05-03

## 2021-05-05 ENCOUNTER — TELEPHONE (OUTPATIENT)
Dept: OBSTETRICS AND GYNECOLOGY | Facility: CLINIC | Age: 31
End: 2021-05-05

## 2021-07-09 ENCOUNTER — PROCEDURE VISIT (OUTPATIENT)
Dept: OBSTETRICS AND GYNECOLOGY | Facility: CLINIC | Age: 31
End: 2021-07-09
Attending: OBSTETRICS & GYNECOLOGY
Payer: MEDICAID

## 2021-07-09 VITALS
BODY MASS INDEX: 23.85 KG/M2 | WEIGHT: 129.63 LBS | DIASTOLIC BLOOD PRESSURE: 60 MMHG | HEIGHT: 62 IN | SYSTOLIC BLOOD PRESSURE: 110 MMHG

## 2021-07-09 DIAGNOSIS — Z30.433 ENCOUNTER FOR REMOVAL AND REINSERTION OF INTRAUTERINE CONTRACEPTIVE DEVICE (IUD): Primary | ICD-10-CM

## 2021-07-09 DIAGNOSIS — Z00.00 HEALTH CARE MAINTENANCE: ICD-10-CM

## 2021-07-09 PROCEDURE — 99499 UNLISTED E&M SERVICE: CPT | Mod: S$PBB,,, | Performed by: OBSTETRICS & GYNECOLOGY

## 2021-07-09 PROCEDURE — 58300 INSERT INTRAUTERINE DEVICE: CPT | Mod: S$PBB,,, | Performed by: OBSTETRICS & GYNECOLOGY

## 2021-07-09 PROCEDURE — 99499 NO LOS: ICD-10-PCS | Mod: S$PBB,,, | Performed by: OBSTETRICS & GYNECOLOGY

## 2021-07-09 PROCEDURE — 58300 PR INSERT INTRAUTERINE DEVICE: ICD-10-PCS | Mod: S$PBB,,, | Performed by: OBSTETRICS & GYNECOLOGY

## 2021-07-09 PROCEDURE — 58300 INSERT INTRAUTERINE DEVICE: CPT | Mod: PBBFAC | Performed by: OBSTETRICS & GYNECOLOGY

## 2021-07-09 PROCEDURE — 58301 REMOVE INTRAUTERINE DEVICE: CPT | Mod: S$PBB,,, | Performed by: OBSTETRICS & GYNECOLOGY

## 2021-07-09 PROCEDURE — 58301 REMOVE INTRAUTERINE DEVICE: CPT | Mod: PBBFAC | Performed by: OBSTETRICS & GYNECOLOGY

## 2021-07-09 PROCEDURE — 58301 PR REMOVE, INTRAUTERINE DEVICE: ICD-10-PCS | Mod: S$PBB,,, | Performed by: OBSTETRICS & GYNECOLOGY

## 2021-07-09 RX ORDER — TIZANIDINE 2 MG/1
TABLET ORAL
COMMUNITY
Start: 2021-04-26 | End: 2021-09-16

## 2021-07-09 RX ADMIN — LEVONORGESTREL 1 INTRA UTERINE DEVICE: 52 INTRAUTERINE DEVICE INTRAUTERINE at 01:07

## 2021-09-16 ENCOUNTER — OFFICE VISIT (OUTPATIENT)
Dept: FAMILY MEDICINE | Facility: CLINIC | Age: 31
End: 2021-09-16
Payer: MEDICAID

## 2021-09-16 ENCOUNTER — PATIENT MESSAGE (OUTPATIENT)
Dept: FAMILY MEDICINE | Facility: CLINIC | Age: 31
End: 2021-09-16

## 2021-09-16 VITALS
OXYGEN SATURATION: 98 % | HEIGHT: 62 IN | BODY MASS INDEX: 23.89 KG/M2 | SYSTOLIC BLOOD PRESSURE: 108 MMHG | RESPIRATION RATE: 14 BRPM | DIASTOLIC BLOOD PRESSURE: 70 MMHG | HEART RATE: 78 BPM | TEMPERATURE: 99 F | WEIGHT: 129.81 LBS

## 2021-09-16 DIAGNOSIS — G43.109 MIGRAINE WITH AURA AND WITHOUT STATUS MIGRAINOSUS, NOT INTRACTABLE: ICD-10-CM

## 2021-09-16 PROCEDURE — 99205 OFFICE O/P NEW HI 60 MIN: CPT | Performed by: NURSE PRACTITIONER

## 2021-09-16 PROCEDURE — 99204 PR OFFICE/OUTPT VISIT, NEW, LEVL IV, 45-59 MIN: ICD-10-PCS | Mod: S$PBB,,, | Performed by: NURSE PRACTITIONER

## 2021-09-16 PROCEDURE — 99204 OFFICE O/P NEW MOD 45 MIN: CPT | Mod: S$PBB,,, | Performed by: NURSE PRACTITIONER

## 2021-09-16 RX ORDER — FAMOTIDINE 20 MG/1
20 TABLET, FILM COATED ORAL 2 TIMES DAILY PRN
COMMUNITY
Start: 2021-08-13

## 2021-09-17 ENCOUNTER — PATIENT MESSAGE (OUTPATIENT)
Dept: FAMILY MEDICINE | Facility: CLINIC | Age: 31
End: 2021-09-17

## 2021-09-17 DIAGNOSIS — G43.109 MIGRAINE WITH AURA AND WITHOUT STATUS MIGRAINOSUS, NOT INTRACTABLE: Primary | ICD-10-CM

## 2021-09-20 ENCOUNTER — TELEPHONE (OUTPATIENT)
Dept: FAMILY MEDICINE | Facility: CLINIC | Age: 31
End: 2021-09-20

## 2021-09-20 DIAGNOSIS — G43.109 MIGRAINE WITH AURA AND WITHOUT STATUS MIGRAINOSUS, NOT INTRACTABLE: Primary | ICD-10-CM

## 2021-10-11 ENCOUNTER — HOSPITAL ENCOUNTER (OUTPATIENT)
Dept: RADIOLOGY | Facility: HOSPITAL | Age: 31
Discharge: HOME | End: 2021-10-11
Attending: NURSE PRACTITIONER
Payer: MEDICAID

## 2021-10-11 DIAGNOSIS — G43.109 MIGRAINE WITH AURA AND WITHOUT STATUS MIGRAINOSUS, NOT INTRACTABLE: ICD-10-CM

## 2021-10-11 PROCEDURE — 93880 EXTRACRANIAL BILAT STUDY: CPT | Mod: TC,PO

## 2021-10-12 DIAGNOSIS — G45.9 TRANSIENT ISCHEMIC ATTACK: Primary | ICD-10-CM

## 2021-10-19 ENCOUNTER — TELEPHONE (OUTPATIENT)
Dept: FAMILY MEDICINE | Facility: CLINIC | Age: 31
End: 2021-10-19

## 2021-10-25 ENCOUNTER — OFFICE VISIT (OUTPATIENT)
Dept: FAMILY MEDICINE | Facility: CLINIC | Age: 31
End: 2021-10-25
Payer: MEDICAID

## 2021-10-25 ENCOUNTER — PATIENT MESSAGE (OUTPATIENT)
Dept: FAMILY MEDICINE | Facility: CLINIC | Age: 31
End: 2021-10-25

## 2021-10-25 VITALS
TEMPERATURE: 98 F | SYSTOLIC BLOOD PRESSURE: 110 MMHG | WEIGHT: 131.81 LBS | HEART RATE: 82 BPM | RESPIRATION RATE: 16 BRPM | HEIGHT: 62 IN | DIASTOLIC BLOOD PRESSURE: 78 MMHG | OXYGEN SATURATION: 99 % | BODY MASS INDEX: 24.26 KG/M2

## 2021-10-25 DIAGNOSIS — Z13.220 ENCOUNTER FOR LIPID SCREENING FOR CARDIOVASCULAR DISEASE: ICD-10-CM

## 2021-10-25 DIAGNOSIS — R68.89 COLD INTOLERANCE: ICD-10-CM

## 2021-10-25 DIAGNOSIS — R51.9 PERSISTENT HEADACHES: Primary | ICD-10-CM

## 2021-10-25 DIAGNOSIS — M79.10 MUSCLE TENSION PAIN: ICD-10-CM

## 2021-10-25 DIAGNOSIS — Z13.6 ENCOUNTER FOR LIPID SCREENING FOR CARDIOVASCULAR DISEASE: ICD-10-CM

## 2021-10-25 PROCEDURE — 99214 PR OFFICE/OUTPT VISIT, EST, LEVL IV, 30-39 MIN: ICD-10-PCS | Mod: S$PBB,,, | Performed by: NURSE PRACTITIONER

## 2021-10-25 PROCEDURE — 99214 OFFICE O/P EST MOD 30 MIN: CPT | Mod: S$PBB,,, | Performed by: NURSE PRACTITIONER

## 2021-10-25 PROCEDURE — 99214 OFFICE O/P EST MOD 30 MIN: CPT | Performed by: NURSE PRACTITIONER

## 2021-10-25 RX ORDER — RIMEGEPANT SULFATE 75 MG/75MG
TABLET, ORALLY DISINTEGRATING ORAL
COMMUNITY
Start: 2021-10-11

## 2021-10-26 RX ORDER — TIZANIDINE 2 MG/1
2 TABLET ORAL 2 TIMES DAILY PRN
Qty: 60 TABLET | Refills: 1 | Status: SHIPPED | OUTPATIENT
Start: 2021-10-26 | End: 2022-11-07

## 2021-10-29 ENCOUNTER — HOSPITAL ENCOUNTER (OUTPATIENT)
Dept: RADIOLOGY | Facility: HOSPITAL | Age: 31
Discharge: HOME | End: 2021-10-29
Attending: NURSE PRACTITIONER
Payer: MEDICAID

## 2021-10-29 DIAGNOSIS — G45.9 TRANSIENT ISCHEMIC ATTACK: ICD-10-CM

## 2021-10-29 PROCEDURE — 70551 MRI BRAIN STEM W/O DYE: CPT | Mod: TC,PO

## 2021-11-01 ENCOUNTER — OFFICE VISIT (OUTPATIENT)
Dept: OPHTHALMOLOGY | Facility: CLINIC | Age: 31
End: 2021-11-01
Payer: MEDICAID

## 2021-11-01 DIAGNOSIS — G43.109 MIGRAINE WITH AURA, NOT INTRACTABLE, WITHOUT STATUS MIGRAINOSUS: Primary | ICD-10-CM

## 2021-11-01 PROCEDURE — 99203 OFFICE O/P NEW LOW 30 MIN: CPT | Mod: S$PBB,,, | Performed by: OPHTHALMOLOGY

## 2021-11-01 PROCEDURE — 99203 PR OFFICE/OUTPT VISIT, NEW, LEVL III, 30-44 MIN: ICD-10-PCS | Mod: S$PBB,,, | Performed by: OPHTHALMOLOGY

## 2021-11-01 PROCEDURE — 99999 PR PBB SHADOW E&M-EST. PATIENT-LVL III: CPT | Mod: PBBFAC,,, | Performed by: OPHTHALMOLOGY

## 2021-11-01 PROCEDURE — 99999 PR PBB SHADOW E&M-EST. PATIENT-LVL III: ICD-10-PCS | Mod: PBBFAC,,, | Performed by: OPHTHALMOLOGY

## 2021-11-01 PROCEDURE — 99213 OFFICE O/P EST LOW 20 MIN: CPT | Mod: PBBFAC | Performed by: OPHTHALMOLOGY

## 2021-11-11 DIAGNOSIS — E78.00 PURE HYPERCHOLESTEROLEMIA: Primary | ICD-10-CM

## 2021-11-15 LAB
A ALTERNATA IGE QN: <0.1 KU/L
A FUMIGATUS IGE QN: <0.1 KU/L
ALBUMIN SERPL-MCNC: 4.7 G/DL (ref 3.6–5.1)
ALBUMIN/GLOB SERPL: 1.7 (CALC) (ref 1–2.5)
ALMOND IGE QN: <0.1 KU/L
ALP SERPL-CCNC: 38 U/L (ref 31–125)
ALT SERPL-CCNC: 12 U/L (ref 6–29)
AMER ROACH IGE QN: <0.1 KU/L
AST SERPL-CCNC: 15 U/L (ref 10–30)
BALD CYPRESS IGE QN: <0.1 KU/L
BASOPHILS # BLD AUTO: 24 CELLS/UL (ref 0–200)
BASOPHILS NFR BLD AUTO: 0.3 %
BERMUDA GRASS IGE QN: <0.1 KU/L
BILIRUB SERPL-MCNC: 0.8 MG/DL (ref 0.2–1.2)
BUN SERPL-MCNC: 13 MG/DL (ref 7–25)
BUN/CREAT SERPL: NORMAL (CALC) (ref 6–22)
C HERBARUM IGE QN: <0.1 KU/L
CALCIUM SERPL-MCNC: 9.2 MG/DL (ref 8.6–10.2)
CASHEW NUT IGE QN: <0.1 KU/L
CHLORIDE SERPL-SCNC: 103 MMOL/L (ref 98–110)
CHOLEST SERPL-MCNC: 263 MG/DL
CHOLEST/HDLC SERPL: 6.6 (CALC)
CMN PIGWEED IGE QN: <0.1 KU/L
CO2 SERPL-SCNC: 26 MMOL/L (ref 20–32)
COCKLEBUR IGE QN: <0.1 KU/L
CODFISH IGE QN: <0.1 KU/L
COMMON RAGWEED IGE QN: <0.1 KU/L
CREAT SERPL-MCNC: 0.81 MG/DL (ref 0.5–1.1)
D FARINAE IGE QN: <0.1 KU/L
D PTERONYSS IGE QN: <0.1 KU/L
DEPRECATED A ALTERNATA IGE RAST QL: 0
DEPRECATED A FUMIGATUS IGE RAST QL: 0
DEPRECATED ALMOND IGE RAST QL: 0
DEPRECATED AMER ROACH IGE RAST QL: 0
DEPRECATED BALD CYPRESS IGE RAST QL: 0
DEPRECATED BERMUDA GRASS IGE RAST QL: 0
DEPRECATED C HERBARUM IGE RAST QL: 0
DEPRECATED CASHEW NUT IGE RAST QL: 0
DEPRECATED COCKLEBUR IGE RAST QL: 0
DEPRECATED CODFISH IGE RAST QL: 0
DEPRECATED COMMON PIGWEED IGE RAST QL: 0
DEPRECATED COMMON RAGWEED IGE RAST QL: 0
DEPRECATED D FARINAE IGE RAST QL: 0
DEPRECATED D PTERONYSS IGE RAST QL: 0
DEPRECATED DOG DANDER IGE RAST QL: 0
DEPRECATED EGG WHITE IGE RAST QL: 0
DEPRECATED ENGL PLANTAIN IGE RAST QL: 0
DEPRECATED HAZELNUT IGE RAST QL: 0
DEPRECATED JOHNSON GRASS IGE RAST QL: 0
DEPRECATED KENT BLUE GRASS IGE RAST QL: 0
DEPRECATED MARSH ELDER IGE RAST QL: 0
DEPRECATED MILK IGE RAST QL: 0
DEPRECATED MT JUNIPER IGE RAST QL: 0
DEPRECATED MUGWORT IGE RAST QL: 0
DEPRECATED NETTLE IGE RAST QL: 0
DEPRECATED P NOTATUM IGE RAST QL: 0
DEPRECATED PEANUT IGE RAST QL: 0
DEPRECATED PECAN/HICK TREE IGE RAST QL: 0
DEPRECATED SALMON IGE RAST QL: 0
DEPRECATED SCALLOP IGE RAST QL: 0
DEPRECATED SESAME SEED IGE RAST QL: 0
DEPRECATED SHRIMP IGE RAST QL: 0
DEPRECATED SOYBEAN IGE RAST QL: 0
DEPRECATED TIMOTHY IGE RAST QL: 0
DEPRECATED TUNA IGE RAST QL: 0
DEPRECATED WALNUT IGE RAST QL: 0
DEPRECATED WHEAT IGE RAST QL: 0
DEPRECATED WHITE ASH IGE RAST QL: 0
DEPRECATED WHITE OAK IGE RAST QL: 0
DOG DANDER IGE QN: <0.1 KU/L
EGG WHITE IGE QN: <0.1 KU/L
ENGL PLANTAIN IGE QN: <0.1 KU/L
EOSINOPHIL # BLD AUTO: 80 CELLS/UL (ref 15–500)
EOSINOPHIL NFR BLD AUTO: 1 %
ERYTHROCYTE [DISTWIDTH] IN BLOOD BY AUTOMATED COUNT: 12.9 % (ref 11–15)
GLOBULIN SER CALC-MCNC: 2.7 G/DL (CALC) (ref 1.9–3.7)
GLUCOSE SERPL-MCNC: 76 MG/DL (ref 65–99)
HAZELNUT IGE QN: <0.1 KU/L
HCT VFR BLD AUTO: 40.8 % (ref 35–45)
HDLC SERPL-MCNC: 40 MG/DL
HGB BLD-MCNC: 13.4 G/DL (ref 11.7–15.5)
JOHNSON GRASS IGE QN: <0.1 KU/L
KENT BLUE GRASS IGE QN: <0.1 KU/L
LDLC SERPL CALC-MCNC: 196 MG/DL (CALC)
LYMPHOCYTES # BLD AUTO: 2144 CELLS/UL (ref 850–3900)
LYMPHOCYTES NFR BLD AUTO: 26.8 %
MARSH ELDER IGE QN: <0.1 KU/L
MCH RBC QN AUTO: 28.6 PG (ref 27–33)
MCHC RBC AUTO-ENTMCNC: 32.8 G/DL (ref 32–36)
MCV RBC AUTO: 87 FL (ref 80–100)
MILK IGE QN: <0.1 KU/L
MONOCYTES # BLD AUTO: 352 CELLS/UL (ref 200–950)
MONOCYTES NFR BLD AUTO: 4.4 %
MT JUNIPER IGE QN: <0.1 KU/L
MUGWORT IGE QN: <0.1 KU/L
NETTLE IGE QN: <0.1 KU/L
NEUTROPHILS # BLD AUTO: 5400 CELLS/UL (ref 1500–7800)
NEUTROPHILS NFR BLD AUTO: 67.5 %
NONHDLC SERPL-MCNC: 223 MG/DL (CALC)
P NOTATUM IGE QN: <0.1 KU/L
PEANUT IGE QN: <0.1 KU/L
PECAN/HICK TREE IGE QN: <0.1 KU/L
PLATELET # BLD AUTO: 236 THOUSAND/UL (ref 140–400)
PMV BLD REES-ECKER: 11.3 FL (ref 7.5–12.5)
POTASSIUM SERPL-SCNC: 4 MMOL/L (ref 3.5–5.3)
PROT SERPL-MCNC: 7.4 G/DL (ref 6.1–8.1)
RBC # BLD AUTO: 4.69 MILLION/UL (ref 3.8–5.1)
REF LAB TEST REF RANGE: NORMAL
SALMON IGE QN: <0.1 KU/L
SCALLOP IGE QN: <0.1 KU/L
SESAME SEED IGE QN: <0.1 KU/L
SHRIMP IGE QN: <0.1 KU/L
SODIUM SERPL-SCNC: 138 MMOL/L (ref 135–146)
SOYBEAN IGE QN: <0.1 KU/L
T3REVERSE SERPL-MCNC: 13 NG/DL (ref 8–25)
T4 FREE SERPL-MCNC: 1.2 NG/DL (ref 0.8–1.8)
THYROPEROXIDASE AB SERPL-ACNC: 2 IU/ML
TIMOTHY IGE QN: <0.1 KU/L
TRIGL SERPL-MCNC: 128 MG/DL
TSH SERPL-ACNC: 1.92 MIU/L
TUNA IGE QN: <0.1 KU/L
WALNUT IGE QN: <0.1 KU/L
WBC # BLD AUTO: 8 THOUSAND/UL (ref 3.8–10.8)
WHEAT IGE QN: <0.1 KU/L
WHITE ASH IGE QN: <0.1 KU/L
WHITE OAK IGE QN: <0.1 KU/L

## 2021-11-18 RX ORDER — ATORVASTATIN CALCIUM 20 MG/1
20 TABLET, FILM COATED ORAL DAILY
Qty: 90 TABLET | Refills: 3 | Status: SHIPPED | OUTPATIENT
Start: 2021-11-18 | End: 2022-05-27 | Stop reason: SDUPTHER

## 2021-12-09 ENCOUNTER — TELEPHONE (OUTPATIENT)
Dept: FAMILY MEDICINE | Facility: CLINIC | Age: 31
End: 2021-12-09
Payer: MEDICAID

## 2021-12-16 DIAGNOSIS — Q21.12 PFO (PATENT FORAMEN OVALE): ICD-10-CM

## 2021-12-16 DIAGNOSIS — G43.709 CHRONIC MIGRAINE W/O AURA, NOT INTRACTABLE, W/O STAT MIGR: Primary | ICD-10-CM

## 2021-12-16 DIAGNOSIS — G45.9 TRANSIENT ISCHEMIC ATTACK (TIA): ICD-10-CM

## 2022-01-04 ENCOUNTER — HOSPITAL ENCOUNTER (OUTPATIENT)
Dept: RADIOLOGY | Facility: HOSPITAL | Age: 32
Discharge: HOME | End: 2022-01-04
Attending: NURSE PRACTITIONER
Payer: MEDICAID

## 2022-01-04 DIAGNOSIS — Q21.12 PFO (PATENT FORAMEN OVALE): ICD-10-CM

## 2022-01-04 DIAGNOSIS — G45.9 TRANSIENT ISCHEMIC ATTACK (TIA): ICD-10-CM

## 2022-01-04 DIAGNOSIS — G43.709 CHRONIC MIGRAINE W/O AURA, NOT INTRACTABLE, W/O STAT MIGR: ICD-10-CM

## 2022-01-04 PROCEDURE — 70544 MR ANGIOGRAPHY HEAD W/O DYE: CPT | Mod: TC,PO

## 2022-02-16 RX ORDER — AMOXICILLIN 500 MG
1 CAPSULE ORAL DAILY
COMMUNITY

## 2022-02-16 RX ORDER — PYRIDOXINE HCL (VITAMIN B6) 100 MG
50 TABLET ORAL DAILY
COMMUNITY

## 2022-02-16 RX ORDER — MULTIVITAMIN
1 TABLET ORAL DAILY
COMMUNITY

## 2022-02-16 RX ORDER — PNV NO.95/FERROUS FUM/FOLIC AC 28MG-0.8MG
100 TABLET ORAL DAILY
COMMUNITY

## 2022-02-17 ENCOUNTER — CLINICAL SUPPORT (OUTPATIENT)
Dept: CARDIOLOGY | Facility: HOSPITAL | Age: 32
End: 2022-02-17
Attending: INTERNAL MEDICINE
Payer: MEDICAID

## 2022-02-17 ENCOUNTER — HOSPITAL ENCOUNTER (OUTPATIENT)
Facility: HOSPITAL | Age: 32
Discharge: HOME | End: 2022-02-17
Attending: INTERNAL MEDICINE | Admitting: INTERNAL MEDICINE
Payer: MEDICAID

## 2022-02-17 ENCOUNTER — ANESTHESIA (OUTPATIENT)
Dept: CARDIOLOGY | Facility: HOSPITAL | Age: 32
End: 2022-02-17
Payer: MEDICAID

## 2022-02-17 ENCOUNTER — ANESTHESIA EVENT (OUTPATIENT)
Dept: CARDIOLOGY | Facility: HOSPITAL | Age: 32
End: 2022-02-17
Payer: MEDICAID

## 2022-02-17 VITALS
HEIGHT: 62 IN | DIASTOLIC BLOOD PRESSURE: 58 MMHG | HEART RATE: 92 BPM | OXYGEN SATURATION: 98 % | WEIGHT: 130.06 LBS | RESPIRATION RATE: 24 BRPM | SYSTOLIC BLOOD PRESSURE: 102 MMHG | BODY MASS INDEX: 23.93 KG/M2 | TEMPERATURE: 98 F

## 2022-02-17 VITALS
TEMPERATURE: 98 F | BODY MASS INDEX: 23.92 KG/M2 | WEIGHT: 130 LBS | HEART RATE: 93 BPM | HEIGHT: 62 IN | DIASTOLIC BLOOD PRESSURE: 63 MMHG | OXYGEN SATURATION: 98 % | SYSTOLIC BLOOD PRESSURE: 111 MMHG | RESPIRATION RATE: 22 BRPM

## 2022-02-17 DIAGNOSIS — Q21.0: ICD-10-CM

## 2022-02-17 DIAGNOSIS — Q21.8 OTHER CONGENITAL MALFORMATIONS OF CARDIAC SEPTA: ICD-10-CM

## 2022-02-17 LAB
ALBUMIN SERPL BCP-MCNC: 4.4 G/DL (ref 3.5–5.2)
ALP SERPL-CCNC: 38 U/L (ref 55–135)
ALT SERPL W/O P-5'-P-CCNC: 21 U/L (ref 10–44)
ANION GAP SERPL CALC-SCNC: 10 MMOL/L (ref 8–16)
APTT PPP: 31.8 SEC (ref 23.3–35.1)
AST SERPL-CCNC: 24 U/L (ref 10–40)
B-HCG UR QL: NEGATIVE
BASOPHILS # BLD AUTO: 0.02 K/UL (ref 0–0.2)
BASOPHILS NFR BLD: 0.2 % (ref 0–1.9)
BILIRUB SERPL-MCNC: 0.7 MG/DL (ref 0.1–1)
BSA FOR ECHO PROCEDURE: 1.61 M2
BUN SERPL-MCNC: 13 MG/DL (ref 6–20)
CALCIUM SERPL-MCNC: 8.9 MG/DL (ref 8.7–10.5)
CHLORIDE SERPL-SCNC: 105 MMOL/L (ref 95–110)
CO2 SERPL-SCNC: 24 MMOL/L (ref 23–29)
CREAT SERPL-MCNC: 0.8 MG/DL (ref 0.5–1.4)
DIFFERENTIAL METHOD: NORMAL
EJECTION FRACTION: 60 %
EOSINOPHIL # BLD AUTO: 0.1 K/UL (ref 0–0.5)
EOSINOPHIL NFR BLD: 1.5 % (ref 0–8)
ERYTHROCYTE [DISTWIDTH] IN BLOOD BY AUTOMATED COUNT: 13.3 % (ref 11.5–14.5)
EST. GFR  (AFRICAN AMERICAN): >60 ML/MIN/1.73 M^2
EST. GFR  (NON AFRICAN AMERICAN): >60 ML/MIN/1.73 M^2
GLUCOSE SERPL-MCNC: 96 MG/DL (ref 70–110)
HCT VFR BLD AUTO: 42.3 % (ref 37–48.5)
HGB BLD-MCNC: 13.7 G/DL (ref 12–16)
IMM GRANULOCYTES # BLD AUTO: 0.02 K/UL (ref 0–0.04)
IMM GRANULOCYTES NFR BLD AUTO: 0.2 % (ref 0–0.5)
INR PPP: 1
LYMPHOCYTES # BLD AUTO: 2.3 K/UL (ref 1–4.8)
LYMPHOCYTES NFR BLD: 27.8 % (ref 18–48)
MCH RBC QN AUTO: 28 PG (ref 27–31)
MCHC RBC AUTO-ENTMCNC: 32.4 G/DL (ref 32–36)
MCV RBC AUTO: 87 FL (ref 82–98)
MONOCYTES # BLD AUTO: 0.5 K/UL (ref 0.3–1)
MONOCYTES NFR BLD: 5.5 % (ref 4–15)
NEUTROPHILS # BLD AUTO: 5.3 K/UL (ref 1.8–7.7)
NEUTROPHILS NFR BLD: 64.8 % (ref 38–73)
NRBC BLD-RTO: 0 /100 WBC
PLATELET # BLD AUTO: 246 K/UL (ref 150–450)
PMV BLD AUTO: 10.5 FL (ref 9.2–12.9)
POTASSIUM SERPL-SCNC: 4.4 MMOL/L (ref 3.5–5.1)
PROT SERPL-MCNC: 7.5 G/DL (ref 6–8.4)
PROTHROMBIN TIME: 12.6 SEC (ref 11.4–13.7)
RBC # BLD AUTO: 4.89 M/UL (ref 4–5.4)
SARS-COV-2 RDRP RESP QL NAA+PROBE: NEGATIVE
SODIUM SERPL-SCNC: 139 MMOL/L (ref 136–145)
WBC # BLD AUTO: 8.2 K/UL (ref 3.9–12.7)

## 2022-02-17 PROCEDURE — 81025 URINE PREGNANCY TEST: CPT | Performed by: INTERNAL MEDICINE

## 2022-02-17 PROCEDURE — 80053 COMPREHEN METABOLIC PANEL: CPT | Performed by: INTERNAL MEDICINE

## 2022-02-17 PROCEDURE — U0002 COVID-19 LAB TEST NON-CDC: HCPCS | Performed by: INTERNAL MEDICINE

## 2022-02-17 PROCEDURE — 85610 PROTHROMBIN TIME: CPT | Performed by: INTERNAL MEDICINE

## 2022-02-17 PROCEDURE — 85730 THROMBOPLASTIN TIME PARTIAL: CPT | Performed by: INTERNAL MEDICINE

## 2022-02-17 PROCEDURE — 37000008 HC ANESTHESIA 1ST 15 MINUTES: Performed by: INTERNAL MEDICINE

## 2022-02-17 PROCEDURE — 63600175 PHARM REV CODE 636 W HCPCS: Performed by: NURSE ANESTHETIST, CERTIFIED REGISTERED

## 2022-02-17 PROCEDURE — 85025 COMPLETE CBC W/AUTO DIFF WBC: CPT | Performed by: INTERNAL MEDICINE

## 2022-02-17 PROCEDURE — 27000671 HC TUBING MICROBORE EXT: Performed by: STUDENT IN AN ORGANIZED HEALTH CARE EDUCATION/TRAINING PROGRAM

## 2022-02-17 PROCEDURE — 93325 DOPPLER ECHO COLOR FLOW MAPG: CPT

## 2022-02-17 PROCEDURE — 01922 ANES N-INVAS IMG/RADJ THER: CPT | Performed by: INTERNAL MEDICINE

## 2022-02-17 PROCEDURE — 25000003 PHARM REV CODE 250: Performed by: NURSE ANESTHETIST, CERTIFIED REGISTERED

## 2022-02-17 RX ORDER — PROPOFOL 10 MG/ML
VIAL (ML) INTRAVENOUS
Status: DISCONTINUED | OUTPATIENT
Start: 2022-02-17 | End: 2022-02-17

## 2022-02-17 RX ORDER — LIDOCAINE HYDROCHLORIDE 20 MG/ML
INJECTION, SOLUTION EPIDURAL; INFILTRATION; INTRACAUDAL; PERINEURAL
Status: DISCONTINUED | OUTPATIENT
Start: 2022-02-17 | End: 2022-02-17

## 2022-02-17 RX ORDER — ICOSAPENT ETHYL 1000 MG/1
2 CAPSULE ORAL 2 TIMES DAILY
COMMUNITY

## 2022-02-17 RX ORDER — CHOLECALCIFEROL (VITAMIN D3) 25 MCG
5000 TABLET ORAL DAILY
COMMUNITY

## 2022-02-17 RX ORDER — SODIUM CHLORIDE 9 MG/ML
INJECTION, SOLUTION INTRAVENOUS CONTINUOUS PRN
Status: DISCONTINUED | OUTPATIENT
Start: 2022-02-17 | End: 2022-02-17

## 2022-02-17 RX ORDER — CHOLECALCIFEROL (VITAMIN D3) 125 MCG
CAPSULE ORAL
COMMUNITY

## 2022-02-17 RX ADMIN — PROPOFOL 20 MG: 10 INJECTION, EMULSION INTRAVENOUS at 09:02

## 2022-02-17 RX ADMIN — LIDOCAINE HYDROCHLORIDE 60 MG: 20 INJECTION, SOLUTION EPIDURAL; INFILTRATION; INTRACAUDAL; PERINEURAL at 09:02

## 2022-02-17 RX ADMIN — PROPOFOL 30 MG: 10 INJECTION, EMULSION INTRAVENOUS at 09:02

## 2022-02-17 RX ADMIN — PROPOFOL 60 MG: 10 INJECTION, EMULSION INTRAVENOUS at 09:02

## 2022-02-17 RX ADMIN — SODIUM CHLORIDE: 9 INJECTION, SOLUTION INTRAVENOUS at 09:02

## 2022-02-17 NOTE — DISCHARGE SUMMARY
[facility]  Cardiology  Discharge Summary      Patient Name: Janene Greenberg  MRN: [id number]  Admission Date: 2/17/2022  Hospital Length of Stay: 0 days  Discharge Date and Time:  02/17/2022 9:23 AM  Attending Physician: Bora Capone MD  Discharging Provider: Bora Capone MD  Primary Care Physician: Deepthi Perez, APRN,FNP-C    HPI:  31-year-old female with a history of a PFO on echo bubble study done at [facility].  She has migraine headaches and more sore for by Neuro care for further evaluation and treatment.  She was therefore scheduled for outpatient PT pulses bilaterally for definitive evaluation of her interatrial septum.    Procedure(s) (LRB):  ECHOCARDIOGRAM,TRANSESOPHAGEAL, BUBBLE STUDY (N/A)     Indwelling Lines/Drains at time of discharge:none  Lines/Drains/Airways     None                 Hospital Course (synopsis of major diagnoses, care, treatment, and services provided during the course of the hospital stay):  Patient will underwent CAG with bubble study which revealed a small PFO.  There was no aneurysmal interatrial septum.  Both ventricles function normal in all valve function was normal.  For aorta was free of disease.  She tolerated procedure well with no complications.  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    Specialties: Cardiology, Interventional  Cardiology  Contact information:  [address]  [address]  [facility] [address]  [phone]                       Patient Instructions:      Diet general     Call MD for:  persistent nausea and vomiting     Call MD for:  difficulty breathing, headache or visual disturbances     No dressing needed     Activity as tolerated     Medications:  Reconciled Home Medications:      Medication List      CONTINUE taking these medications    ascorbic acid (vitamin C) 100 MG tablet  Commonly known as: VITAMIN C  Take 100 mg by mouth once daily.     atorvastatin 20 MG tablet  Commonly known as: LIPITOR  Take 1 tablet (20 mg total) by mouth once daily.     biotin 10,000 mcg Tbdl  Take by mouth.     cyanocobalamin 100 MCG tablet  Commonly known as: VITAMIN B-12  Take 100 mcg by mouth once daily.     famotidine 20 MG tablet  Commonly known as: PEPCID  Take 20 mg by mouth 2 (two) times daily as needed.     fish oil-omega-3 fatty acids 300-1,000 mg capsule  Take 1 capsule by mouth once daily.     Lactobacillus rhamnosus GG 10 billion cell capsule  Commonly known as: CULTURELLE  Take 1 capsule by mouth once daily.     multivitamin per tablet  Commonly known as: THERAGRAN  Take 1 tablet by mouth once daily.     NURTEC 75 mg odt  Generic drug: rimegepant  1 tablet on the tongue and allow to dissolve  as needed     pyridoxine (vitamin B6) 100 MG Tab  Commonly known as: B-6  Take 50 mg by mouth once daily.     tiZANidine 2 MG tablet  Commonly known as: ZANAFLEX  Take 1 tablet (2 mg total) by mouth 2 (two) times daily as needed (Muscle strain).     VASCEPA 1 gram Cap  Generic drug: icosapent ethyL  Take 2 g by mouth 2 (two) times daily.     vitamin D 1000 units Tab  Commonly known as: VITAMIN D3  Take 5,000 Units by mouth once daily.            Time spent on the discharge of patient: <30 minutes    Boar Capone MD  Cardiology  [facility]

## 2022-02-17 NOTE — DISCHARGE INSTRUCTIONS
STACY Discharge Instructions:   Start with soft foods, once you can tolerate soft foods easily, you may begin eating solid foods.    You can not drive for 24 hours    Call your doctor immediately if:   You have fever or chills   You taste blood in your mouth   You have severe sore throat   You have difficulty swallowing   You have questions or concerns about your condition or care.

## 2022-02-17 NOTE — ANESTHESIA PREPROCEDURE EVALUATION
02/17/2022  Janene Greenberg is a 31 y.o., female.      Patient Active Problem List   Diagnosis    Anxiety and depression    Migraines       History reviewed. No pertinent surgical history.     Tobacco Use:  The patient  reports that she has never smoked. She has never used smokeless tobacco.     No results found for this or any previous visit.          Lab Results   Component Value Date    WBC 8.20 02/17/2022    HGB 13.7 02/17/2022    HCT 42.3 02/17/2022    MCV 87 02/17/2022     02/17/2022     BMP  Lab Results   Component Value Date     02/17/2022    K 4.4 02/17/2022     02/17/2022    CO2 24 02/17/2022    BUN 13 02/17/2022    CREATININE 0.8 02/17/2022    CALCIUM 8.9 02/17/2022    ANIONGAP 10 02/17/2022    GLU 96 02/17/2022    GLU 76 11/09/2021     10/25/2016       No results found for this or any previous visit.            Anesthesia Evaluation    I have reviewed the Patient Summary Reports.    I have reviewed the Nursing Notes. I have reviewed the NPO Status.   I have reviewed the Medications.     Review of Systems  Anesthesia Hx:  Denies Family Hx of Anesthesia complications.   Denies Personal Hx of Anesthesia complications.   Social:  Non-Smoker    Cardiovascular:   Exercise tolerance: good hyperlipidemia ECG has been reviewed.    Neurological:   Headaches    Psych:   depression          Physical Exam  General:  Well nourished    Airway/Jaw/Neck:  Airway Findings: Mouth Opening: Normal Tongue: Normal  General Airway Assessment: Adult  Mallampati: II  TM Distance: Normal, at least 6 cm  Jaw/Neck Findings:  Neck ROM: Normal ROM      Dental:  Dental Findings:   Chest/Lungs:  Chest/Lungs Findings: Clear to auscultation, Normal Respiratory Rate     Heart/Vascular:  Heart Findings: Rate: Normal  Rhythm: Regular Rhythm  Sounds: Normal        Mental Status:  Mental Status Findings:  Alert and  Oriented         Anesthesia Plan  Type of Anesthesia, risks & benefits discussed:  Anesthesia Type:  MAC    Patient's Preference:   Plan Factors:          Intra-op Monitoring Plan: standard ASA monitors  Intra-op Monitoring Plan Comments:   Post Op Pain Control Plan:   Post Op Pain Control Plan Comments:     Induction:   IV  Beta Blocker:         Informed Consent: Patient understands risks and agrees with Anesthesia plan.  Questions answered. Anesthesia consent signed with patient.  ASA Score: 2     Day of Surgery Review of History & Physical:            Ready For Surgery From Anesthesia Perspective.

## 2022-02-17 NOTE — ANESTHESIA POSTPROCEDURE EVALUATION
Anesthesia Post Evaluation    Patient: Janene Ray    Procedure(s) Performed: Procedure(s) (LRB):  ECHOCARDIOGRAM,TRANSESOPHAGEAL, BUBBLE STUDY (N/A)    Final Anesthesia Type: MAC      Patient location during evaluation: [facility]  Patient participation: Yes- Able to Participate  Level of consciousness: awake and alert  Post-procedure vital signs: reviewed and stable  Pain management: adequate  Airway patency: patent    PONV status at discharge: No PONV  Anesthetic complications: no      Cardiovascular status: hemodynamically stable  Respiratory status: unassisted, spontaneous ventilation and room air  Hydration status: euvolemic  Follow-up not needed.          Vitals Value Taken Time   /62 02/17/22 0927   Temp 36.9 °C (98.4 °F) 02/17/22 0922   Pulse 83 02/17/22 0929   Resp 22 02/17/22 0929   SpO2 97 % 02/17/22 0929   Vitals shown include unvalidated device data.      No case tracking events are documented in the log.      Pain/Vel Score: No data recorded

## 2022-02-17 NOTE — TRANSFER OF CARE
"Anesthesia Transfer of Care Note    Patient: Janene Ray    Procedure(s) Performed: Procedure(s) (LRB):  ECHOCARDIOGRAM,TRANSESOPHAGEAL, BUBBLE STUDY (N/A)    Patient location: Other: [facility]    Anesthesia Type: MAC    Transport from OR: Transported from OR on room air with adequate spontaneous ventilation    Post pain: adequate analgesia    Post assessment: no apparent anesthetic complications    Post vital signs: stable    Level of consciousness: awake, alert and oriented    Nausea/Vomiting: no nausea/vomiting    Complications: none    Transfer of care protocol was followedComments: Pt remaining in [facility] center to be recovered. Pt in NAD. VSS. Report to RN per protocol.         Last vitals:   Visit Vitals  BP (!) 102/58 (BP Location: Right arm, Patient Position: Lying)   Pulse 92   Temp 36.9 °C (98.4 °F) (Oral)   Resp 18   Ht 5' 2" (1.575 m)   Wt 59 kg (130 lb)   LMP 02/17/2016   SpO2 98%   Breastfeeding No   BMI 23.78 kg/m²     "

## 2022-02-24 ENCOUNTER — TELEPHONE (OUTPATIENT)
Dept: FAMILY MEDICINE | Facility: CLINIC | Age: 32
End: 2022-02-24
Payer: MEDICAID

## 2022-02-24 DIAGNOSIS — Z12.31 BREAST CANCER SCREENING BY MAMMOGRAM: Primary | ICD-10-CM

## 2022-02-25 NOTE — TELEPHONE ENCOUNTER
----- Message from Vijaya Medina sent at 2/24/2022 11:12 AM CST -----  Regarding: Mammo order  Pt called requesting an order for a mammogram be sent to [facility] imaging

## 2022-03-03 ENCOUNTER — HOSPITAL ENCOUNTER (OUTPATIENT)
Dept: RADIOLOGY | Facility: HOSPITAL | Age: 32
Discharge: HOME | End: 2022-03-03
Attending: NURSE PRACTITIONER
Payer: MEDICAID

## 2022-03-03 ENCOUNTER — TELEPHONE (OUTPATIENT)
Dept: OBSTETRICS AND GYNECOLOGY | Facility: CLINIC | Age: 32
End: 2022-03-03
Payer: MEDICAID

## 2022-03-03 DIAGNOSIS — Z80.3 FAMILY HISTORY OF BREAST CANCER IN FEMALE: ICD-10-CM

## 2022-03-03 DIAGNOSIS — Z12.31 BREAST CANCER SCREENING BY MAMMOGRAM: ICD-10-CM

## 2022-03-03 DIAGNOSIS — N64.4 BREAST PAIN, LEFT: Primary | ICD-10-CM

## 2022-03-03 NOTE — TELEPHONE ENCOUNTER
Pt is getting  in April and would like to try to start conceiving.  Requesting IUD removed before her annual appt with Dr. Lucio.    Scheduled IUD removal with Lesly at [facility].  Advised to keep annual appt with Dr. Lucio.

## 2022-03-18 ENCOUNTER — PATIENT MESSAGE (OUTPATIENT)
Dept: OBSTETRICS AND GYNECOLOGY | Facility: CLINIC | Age: 32
End: 2022-03-18

## 2022-03-18 ENCOUNTER — OFFICE VISIT (OUTPATIENT)
Dept: OBSTETRICS AND GYNECOLOGY | Facility: CLINIC | Age: 32
End: 2022-03-18
Payer: MEDICAID

## 2022-03-18 VITALS
DIASTOLIC BLOOD PRESSURE: 70 MMHG | SYSTOLIC BLOOD PRESSURE: 130 MMHG | BODY MASS INDEX: 25.15 KG/M2 | WEIGHT: 136.69 LBS | HEIGHT: 62 IN

## 2022-03-18 DIAGNOSIS — Z91.89 AT RISK FOR INFERTILITY: ICD-10-CM

## 2022-03-18 DIAGNOSIS — N89.8 VAGINAL ODOR: ICD-10-CM

## 2022-03-18 DIAGNOSIS — Z30.432 ENCOUNTER FOR IUD REMOVAL: Primary | ICD-10-CM

## 2022-03-18 PROCEDURE — 3008F PR BODY MASS INDEX (BMI) DOCUMENTED: ICD-10-PCS | Mod: CPTII,,, | Performed by: NURSE PRACTITIONER

## 2022-03-18 PROCEDURE — 3075F PR MOST RECENT SYSTOLIC BLOOD PRESS GE 130-139MM HG: ICD-10-PCS | Mod: CPTII,,, | Performed by: NURSE PRACTITIONER

## 2022-03-18 PROCEDURE — 99999 PR PBB SHADOW E&M-EST. PATIENT-LVL III: CPT | Mod: PBBFAC,,, | Performed by: NURSE PRACTITIONER

## 2022-03-18 PROCEDURE — 99213 OFFICE O/P EST LOW 20 MIN: CPT | Mod: 25,S$PBB,, | Performed by: NURSE PRACTITIONER

## 2022-03-18 PROCEDURE — 99213 PR OFFICE/OUTPT VISIT, EST, LEVL III, 20-29 MIN: ICD-10-PCS | Mod: 25,S$PBB,, | Performed by: NURSE PRACTITIONER

## 2022-03-18 PROCEDURE — 58301 REMOVE INTRAUTERINE DEVICE: CPT | Mod: PBBFAC | Performed by: NURSE PRACTITIONER

## 2022-03-18 PROCEDURE — 58301 REMOVE INTRAUTERINE DEVICE: CPT | Mod: S$PBB,,, | Performed by: NURSE PRACTITIONER

## 2022-03-18 PROCEDURE — 1159F PR MEDICATION LIST DOCUMENTED IN MEDICAL RECORD: ICD-10-PCS | Mod: CPTII,,, | Performed by: NURSE PRACTITIONER

## 2022-03-18 PROCEDURE — 3008F BODY MASS INDEX DOCD: CPT | Mod: CPTII,,, | Performed by: NURSE PRACTITIONER

## 2022-03-18 PROCEDURE — 1159F MED LIST DOCD IN RCRD: CPT | Mod: CPTII,,, | Performed by: NURSE PRACTITIONER

## 2022-03-18 PROCEDURE — 3078F PR MOST RECENT DIASTOLIC BLOOD PRESSURE < 80 MM HG: ICD-10-PCS | Mod: CPTII,,, | Performed by: NURSE PRACTITIONER

## 2022-03-18 PROCEDURE — 1160F RVW MEDS BY RX/DR IN RCRD: CPT | Mod: CPTII,,, | Performed by: NURSE PRACTITIONER

## 2022-03-18 PROCEDURE — 3075F SYST BP GE 130 - 139MM HG: CPT | Mod: CPTII,,, | Performed by: NURSE PRACTITIONER

## 2022-03-18 PROCEDURE — 58301 PR REMOVE, INTRAUTERINE DEVICE: ICD-10-PCS | Mod: S$PBB,,, | Performed by: NURSE PRACTITIONER

## 2022-03-18 PROCEDURE — 99999 PR PBB SHADOW E&M-EST. PATIENT-LVL III: ICD-10-PCS | Mod: PBBFAC,,, | Performed by: NURSE PRACTITIONER

## 2022-03-18 PROCEDURE — 1160F PR REVIEW ALL MEDS BY PRESCRIBER/CLIN PHARMACIST DOCUMENTED: ICD-10-PCS | Mod: CPTII,,, | Performed by: NURSE PRACTITIONER

## 2022-03-18 PROCEDURE — 87481 CANDIDA DNA AMP PROBE: CPT | Mod: 59 | Performed by: NURSE PRACTITIONER

## 2022-03-18 PROCEDURE — 3078F DIAST BP <80 MM HG: CPT | Mod: CPTII,,, | Performed by: NURSE PRACTITIONER

## 2022-03-18 PROCEDURE — 99213 OFFICE O/P EST LOW 20 MIN: CPT | Mod: PBBFAC,25 | Performed by: NURSE PRACTITIONER

## 2022-03-18 RX ORDER — CLOPIDOGREL BISULFATE 75 MG/1
75 TABLET ORAL DAILY
COMMUNITY
Start: 2022-03-09

## 2022-03-18 NOTE — PROGRESS NOTES
History & Physical  Gynecology      SUBJECTIVE:     Chief Complaint: Contraception (IUD Removal)       History of Present Illness: Patient presents to clinic for IUD removal, desires pregnancy. Tahira diagnosed with stage 4 intestinal cancer last year, resection with possible severing of pudendal nerve reported, has met with urology with fertility referral. Patient is not currently taking PNV.     Reports vaginal odor, requesting swab.     Review of patient's allergies indicates:  No Known Allergies    Past Medical History:   Diagnosis Date    Abnormal Pap smear of cervix 2014    colpo done no bx bc was pregnant    Depression     1st pregnancy    HPV in female     Hyperlipidemia     Hypotension     Migraines      History reviewed. No pertinent surgical history.  OB History        3    Para   3    Term   3       0    AB   0    Living   1       SAB   0    IAB   0    Ectopic        Multiple   0    Live Births   1           Obstetric Comments   Menarche 12           Family History   Problem Relation Age of Onset    Breast cancer Maternal Grandmother     Rectal cancer Maternal Grandmother     Lung cancer Father         smoker    Pneumonia Mother         smoker    Stroke Brother     No Known Problems Brother     Alcohol abuse Brother     Alcohol abuse Brother     Alzheimer's disease Paternal Grandmother     Colon cancer Neg Hx     Ovarian cancer Neg Hx      Social History     Tobacco Use    Smoking status: Never Smoker    Smokeless tobacco: Never Used   Substance Use Topics    Alcohol use: No    Drug use: No       Current Outpatient Medications   Medication Sig    ascorbic acid, vitamin C, (VITAMIN C) 100 MG tablet Take 100 mg by mouth once daily.    atorvastatin (LIPITOR) 20 MG tablet Take 1 tablet (20 mg total) by mouth once daily.    biotin 10,000 mcg TbDL Take by mouth.    cyanocobalamin (VITAMIN B-12) 100 MCG tablet Take 100 mcg by mouth once daily.    famotidine (PEPCID)  20 MG tablet Take 20 mg by mouth 2 (two) times daily as needed.    icosapent ethyL (VASCEPA) 1 gram Cap Take 2 g by mouth 2 (two) times daily.    Lactobacillus rhamnosus GG (CULTURELLE) 10 billion cell capsule Take 1 capsule by mouth once daily.    multivitamin (THERAGRAN) per tablet Take 1 tablet by mouth once daily.    omega-3 fatty acids/fish oil (FISH OIL-OMEGA-3 FATTY ACIDS) 300-1,000 mg capsule Take 1 capsule by mouth once daily.    pyridoxine, vitamin B6, (B-6) 100 MG Tab Take 50 mg by mouth once daily.    rimegepant (NURTEC) 75 mg odt 1 tablet on the tongue and allow to dissolve  as needed    tiZANidine (ZANAFLEX) 2 MG tablet Take 1 tablet (2 mg total) by mouth 2 (two) times daily as needed (Muscle strain).    vitamin D (VITAMIN D3) 1000 units Tab Take 5,000 Units by mouth once daily.    clopidogreL (PLAVIX) 75 mg tablet Take 75 mg by mouth once daily.     Current Facility-Administered Medications   Medication    levonorgestreL (MIRENA) 20 mcg/24 hours (6 yrs) 52 mg IUD 1 Intra Uterine Device         Review of Systems:  Review of Systems   Constitutional: Negative for activity change, appetite change, chills, fatigue and fever.   HENT: Negative for mouth sores.    Eyes: Negative for visual disturbance.   Respiratory: Negative for cough and shortness of breath.    Cardiovascular: Negative for chest pain and leg swelling.   Gastrointestinal: Negative for abdominal pain, constipation, diarrhea, nausea, vomiting and reflux.   Endocrine: Negative for hyperthyroidism and hypothyroidism.   Genitourinary: Negative for dysuria, flank pain, frequency, genital sores, hematuria, menstrual problem, pelvic pain, vaginal bleeding, vaginal discharge, vaginal pain, vaginal dryness and vaginal odor.   Musculoskeletal: Negative for arthralgias, back pain and myalgias.   Integumentary:  Negative for rash, breast mass and breast tenderness.   Neurological: Negative for headaches.   Hematological: Negative for  adenopathy. Does not bruise/bleed easily.   Psychiatric/Behavioral: Negative for depression. The patient is not nervous/anxious.    Breast: Negative for asymmetry, mass and tenderness      PHYSICAL EXAM:  VULVA: normal appearing vulva with no masses, tenderness or lesions   VAGINA: normal appearing vagina with normal color and discharge, no lesions   CERVIX: normal appearing cervix without discharge or lesions   UTERUS: uterus is normal size, shape, consistency and nontender   ADNEXA: normal adnexa in size, nontender and no masses       OBJECTIVE:     Physical Exam:  Physical Exam  Vitals and nursing note reviewed.   Constitutional:       Appearance: Normal appearance.   HENT:      Head: Normocephalic and atraumatic.      Nose: Nose normal.      Mouth/Throat:      Mouth: Mucous membranes are moist.   Eyes:      Conjunctiva/sclera: Conjunctivae normal.   Cardiovascular:      Rate and Rhythm: Normal rate.   Pulmonary:      Effort: Pulmonary effort is normal.      Breath sounds: Normal breath sounds.   Abdominal:      Palpations: Abdomen is soft.   Genitourinary:     General: Normal vulva.      Vagina: Normal.      Cervix: Normal.      Uterus: Normal.       Adnexa: Right adnexa normal and left adnexa normal.      Comments: IUD strings present, extending 2 cm from cervical os.   Musculoskeletal:         General: Normal range of motion.      Cervical back: Normal range of motion.   Skin:     General: Skin is warm and dry.   Neurological:      General: No focal deficit present.      Mental Status: She is alert.   Psychiatric:         Mood and Affect: Mood normal.         Behavior: Behavior normal.         Thought Content: Thought content normal.         Judgment: Judgment normal.       PROCEDURE:     PRE-IUD REMOVAL COUNSELING:  The patient was advised of minimal risks of bleeding and pain and she agrees to proceed.    PROCEDURE:  TIME OUT PERFORMED.  IUD strings were visualized at the os and grasped. IUD removed with  gentle traction.  The patient tolerated the procedure well      POST IUD REMOVAL COUNSELING:  Expect period-like flow to occur after Mirena IUD removal and periods to return to pre-IUD pattern.  Manage post IUD removal cramping with NSAIDs, Tylenol or Rx per [facility].    POST IUD REMOVAL CONTRACEPTION:None      ASSESSMENT:       ICD-10-CM ICD-9-CM    1. Encounter for IUD removal  Z30.432 V25.12    2. Vaginal odor  N89.8 625.8 Vaginosis Screen by DNA Probe   3. At risk for infertility  Z91.89 V15.89 Ambulatory referral/consult to Endocrinology          Plan:      IUD removal performed, see procedural note for further information. Affirm today. Declines genetic carrier testing.    Discussed likely need for fertility specialist, external referral placed.     FU with NP as needed.    Counseling time: 20 minutes      GUILHERME Wade

## 2022-03-22 LAB
BACTERIAL VAGINOSIS DNA: POSITIVE
CANDIDA GLABRATA DNA: NEGATIVE
CANDIDA KRUSEI DNA: NEGATIVE
CANDIDA RRNA VAG QL PROBE: NEGATIVE
T VAGINALIS RRNA GENITAL QL PROBE: NEGATIVE

## 2022-03-23 ENCOUNTER — PATIENT MESSAGE (OUTPATIENT)
Dept: OBSTETRICS AND GYNECOLOGY | Facility: CLINIC | Age: 32
End: 2022-03-23
Payer: MEDICAID

## 2022-03-23 DIAGNOSIS — B96.89 BACTERIAL VAGINITIS: Primary | ICD-10-CM

## 2022-03-23 DIAGNOSIS — N76.0 BACTERIAL VAGINITIS: Primary | ICD-10-CM

## 2022-03-23 RX ORDER — FLUCONAZOLE 150 MG/1
150 TABLET ORAL DAILY
Qty: 1 TABLET | Refills: 0 | Status: SHIPPED | OUTPATIENT
Start: 2022-03-23 | End: 2022-03-24

## 2022-03-23 RX ORDER — METRONIDAZOLE 500 MG/1
500 TABLET ORAL 2 TIMES DAILY
Qty: 14 TABLET | Refills: 0 | Status: SHIPPED | OUTPATIENT
Start: 2022-03-23 | End: 2022-03-30

## 2022-04-13 ENCOUNTER — HOSPITAL ENCOUNTER (OUTPATIENT)
Dept: RADIOLOGY | Facility: HOSPITAL | Age: 32
Discharge: HOME | End: 2022-04-13
Attending: NURSE PRACTITIONER
Payer: MEDICAID

## 2022-04-13 VITALS — HEIGHT: 62 IN | WEIGHT: 136.69 LBS | BODY MASS INDEX: 25.15 KG/M2

## 2022-04-13 DIAGNOSIS — N64.4 BREAST TENDERNESS IN FEMALE: ICD-10-CM

## 2022-04-13 DIAGNOSIS — Z80.3 FAMILY HISTORY OF MALIGNANT NEOPLASM OF BREAST: ICD-10-CM

## 2022-04-13 DIAGNOSIS — N64.89 OTHER SPECIFIED DISORDERS OF BREAST: ICD-10-CM

## 2022-04-13 PROCEDURE — 77062 BREAST TOMOSYNTHESIS BI: CPT | Mod: TC,PO

## 2022-04-13 PROCEDURE — 76642 ULTRASOUND BREAST LIMITED: CPT | Mod: TC,PO,RT

## 2022-05-27 ENCOUNTER — OFFICE VISIT (OUTPATIENT)
Dept: FAMILY MEDICINE | Facility: CLINIC | Age: 32
End: 2022-05-27
Payer: MEDICAID

## 2022-05-27 ENCOUNTER — HOSPITAL ENCOUNTER (OUTPATIENT)
Dept: RADIOLOGY | Facility: HOSPITAL | Age: 32
Discharge: HOME | End: 2022-05-27
Attending: NURSE PRACTITIONER
Payer: MEDICAID

## 2022-05-27 VITALS
HEART RATE: 94 BPM | WEIGHT: 131 LBS | RESPIRATION RATE: 18 BRPM | BODY MASS INDEX: 24.11 KG/M2 | OXYGEN SATURATION: 96 % | SYSTOLIC BLOOD PRESSURE: 116 MMHG | DIASTOLIC BLOOD PRESSURE: 78 MMHG | HEIGHT: 62 IN

## 2022-05-27 DIAGNOSIS — M25.561 ACUTE PAIN OF RIGHT KNEE: ICD-10-CM

## 2022-05-27 DIAGNOSIS — E78.00 PURE HYPERCHOLESTEROLEMIA: ICD-10-CM

## 2022-05-27 DIAGNOSIS — M25.561 ACUTE PAIN OF RIGHT KNEE: Primary | ICD-10-CM

## 2022-05-27 PROCEDURE — 3008F BODY MASS INDEX DOCD: CPT | Mod: CPTII,,, | Performed by: NURSE PRACTITIONER

## 2022-05-27 PROCEDURE — 3074F SYST BP LT 130 MM HG: CPT | Mod: CPTII,,, | Performed by: NURSE PRACTITIONER

## 2022-05-27 PROCEDURE — 1159F MED LIST DOCD IN RCRD: CPT | Mod: CPTII,,, | Performed by: NURSE PRACTITIONER

## 2022-05-27 PROCEDURE — 99212 OFFICE O/P EST SF 10 MIN: CPT | Mod: S$PBB,,, | Performed by: NURSE PRACTITIONER

## 2022-05-27 PROCEDURE — 3078F DIAST BP <80 MM HG: CPT | Mod: CPTII,,, | Performed by: NURSE PRACTITIONER

## 2022-05-27 PROCEDURE — 1159F PR MEDICATION LIST DOCUMENTED IN MEDICAL RECORD: ICD-10-PCS | Mod: CPTII,,, | Performed by: NURSE PRACTITIONER

## 2022-05-27 PROCEDURE — 3074F PR MOST RECENT SYSTOLIC BLOOD PRESSURE < 130 MM HG: ICD-10-PCS | Mod: CPTII,,, | Performed by: NURSE PRACTITIONER

## 2022-05-27 PROCEDURE — 3078F PR MOST RECENT DIASTOLIC BLOOD PRESSURE < 80 MM HG: ICD-10-PCS | Mod: CPTII,,, | Performed by: NURSE PRACTITIONER

## 2022-05-27 PROCEDURE — 99215 OFFICE O/P EST HI 40 MIN: CPT | Performed by: NURSE PRACTITIONER

## 2022-05-27 PROCEDURE — 3008F PR BODY MASS INDEX (BMI) DOCUMENTED: ICD-10-PCS | Mod: CPTII,,, | Performed by: NURSE PRACTITIONER

## 2022-05-27 PROCEDURE — 99212 PR OFFICE/OUTPT VISIT, EST, LEVL II, 10-19 MIN: ICD-10-PCS | Mod: S$PBB,,, | Performed by: NURSE PRACTITIONER

## 2022-05-27 PROCEDURE — 73564 X-RAY EXAM KNEE 4 OR MORE: CPT | Mod: TC,RT

## 2022-05-27 RX ORDER — ATORVASTATIN CALCIUM 20 MG/1
20 TABLET, FILM COATED ORAL DAILY
Qty: 90 TABLET | Refills: 3 | Status: SHIPPED | OUTPATIENT
Start: 2022-05-27 | End: 2023-05-27

## 2022-05-27 NOTE — PROGRESS NOTES
Patient ID: Telma Greenberg is a 31 y.o. female.    Chief Complaint: Knee Pain (Right x's 3 weeks)    Ms. Greenberg presents today for urgent visit. She states she has been having right knee pain intermittently for past 3 days. She notices the pain the most when she presses down of pedal while driving. She states she drives over 150 miles per day . She notices the pain even while sitting. She states at night it can wake her up out of her sleep. She denies any previous injury to knee.  She also denies family history of osteoarthritis.     She also states she has been noticing eye twitching for 4 days and resumed B-12 supplement due. She is asking about other possible vitamin deficiencies.    Patient is requesting refill on atorvastatin.     Knee Pain   The incident occurred 5 to 7 days ago. Incident location: No known incident cause. There was no injury mechanism. The pain is present in the right knee. The quality of the pain is described as aching and stabbing. The pain is at a severity of 6/10. The pain is moderate. The pain has been fluctuating since onset. She reports no foreign bodies present. Nothing aggravates the symptoms. She has tried nothing for the symptoms.           Past Medical History:   Diagnosis Date    Abnormal Pap smear of cervix 06/2014    colpo done no bx bc was pregnant    Depression     1st pregnancy    HPV in female     Hyperlipidemia     Hypotension     Migraines      History reviewed. No pertinent surgical history.      Tobacco History:  reports that she has never smoked. She has never used smokeless tobacco.      Review of patient's allergies indicates:  No Known Allergies    Current Outpatient Medications:     ascorbic acid, vitamin C, (VITAMIN C) 100 MG tablet, Take 100 mg by mouth once daily., Disp: , Rfl:     biotin 10,000 mcg TbDL, Take by mouth., Disp: , Rfl:     clopidogreL (PLAVIX) 75 mg tablet, Take 75 mg by mouth once daily., Disp: , Rfl:     cyanocobalamin (VITAMIN B-12) 100 MCG  tablet, Take 100 mcg by mouth once daily., Disp: , Rfl:     famotidine (PEPCID) 20 MG tablet, Take 20 mg by mouth 2 (two) times daily as needed., Disp: , Rfl:     icosapent ethyL (VASCEPA) 1 gram Cap, Take 2 g by mouth 2 (two) times daily., Disp: , Rfl:     Lactobacillus rhamnosus GG (CULTURELLE) 10 billion cell capsule, Take 1 capsule by mouth once daily., Disp: , Rfl:     multivitamin (THERAGRAN) per tablet, Take 1 tablet by mouth once daily., Disp: , Rfl:     omega-3 fatty acids/fish oil (FISH OIL-OMEGA-3 FATTY ACIDS) 300-1,000 mg capsule, Take 1 capsule by mouth once daily., Disp: , Rfl:     pyridoxine, vitamin B6, (B-6) 100 MG Tab, Take 50 mg by mouth once daily., Disp: , Rfl:     rimegepant (NURTEC) 75 mg odt, 1 tablet on the tongue and allow to dissolve  as needed, Disp: , Rfl:     tiZANidine (ZANAFLEX) 2 MG tablet, Take 1 tablet (2 mg total) by mouth 2 (two) times daily as needed (Muscle strain)., Disp: 60 tablet, Rfl: 1    vitamin D (VITAMIN D3) 1000 units Tab, Take 5,000 Units by mouth once daily., Disp: , Rfl:     atorvastatin (LIPITOR) 20 MG tablet, Take 1 tablet (20 mg total) by mouth once daily., Disp: 90 tablet, Rfl: 3  No current facility-administered medications for this visit.    Review of Systems   Constitutional: Negative for activity change, appetite change, fatigue, fever and unexpected weight change.   HENT: Negative for congestion, mouth sores, nosebleeds, postnasal drip, rhinorrhea, sinus pressure, sinus pain, sneezing, sore throat and trouble swallowing.    Eyes: Negative for pain, redness and itching.   Respiratory: Negative for chest tightness and shortness of breath.    Cardiovascular: Negative for chest pain and palpitations.   Gastrointestinal: Negative for abdominal pain, blood in stool, constipation, diarrhea, nausea and vomiting.   Endocrine: Negative for cold intolerance, heat intolerance, polydipsia, polyphagia and polyuria.   Genitourinary: Negative for difficulty  "urinating, dysuria, flank pain, frequency, genital sores, menstrual problem, urgency, vaginal bleeding and vaginal discharge.   Musculoskeletal: Positive for arthralgias. Negative for myalgias.   Skin: Negative for rash and wound.   Allergic/Immunologic: Negative for environmental allergies and food allergies.   Neurological: Negative for dizziness, light-headedness and headaches.   Hematological: Negative for adenopathy. Does not bruise/bleed easily.   Psychiatric/Behavioral: Negative for agitation, confusion, hallucinations, self-injury and sleep disturbance. The patient is not nervous/anxious.           Objective:      Vitals:    05/27/22 0908   BP: 116/78   Pulse: 94   Resp: 18   SpO2: 96%   Weight: 59.4 kg (131 lb)   Height: 5' 2" (1.575 m)     Physical Exam  Vitals reviewed.   Constitutional:       General: She is not in acute distress.     Appearance: Normal appearance. She is normal weight. She is not ill-appearing, toxic-appearing or diaphoretic.   HENT:      Head: Normocephalic and atraumatic.      Right Ear: Tympanic membrane and external ear normal. There is no impacted cerumen.      Left Ear: Tympanic membrane and external ear normal. There is no impacted cerumen.      Nose: Nose normal. No congestion or rhinorrhea.      Mouth/Throat:      Mouth: Mucous membranes are moist.      Pharynx: Oropharynx is clear. No oropharyngeal exudate or posterior oropharyngeal erythema.   Eyes:      General: No scleral icterus.        Right eye: No discharge.         Left eye: No discharge.      Extraocular Movements: Extraocular movements intact.      Conjunctiva/sclera: Conjunctivae normal.      Pupils: Pupils are equal, round, and reactive to light.   Neck:      Vascular: No carotid bruit.   Cardiovascular:      Rate and Rhythm: Normal rate and regular rhythm.      Pulses: Normal pulses.      Heart sounds: Normal heart sounds. No murmur heard.    No friction rub. No gallop.   Pulmonary:      Effort: Pulmonary effort " is normal. No respiratory distress.      Breath sounds: Normal breath sounds. No stridor. No rales.   Chest:      Chest wall: No tenderness.   Abdominal:      General: Abdomen is flat. Bowel sounds are normal.      Palpations: Abdomen is soft. There is no mass.      Tenderness: There is no abdominal tenderness. There is no guarding.   Musculoskeletal:         General: No swelling or signs of injury. Normal range of motion.      Cervical back: Normal range of motion and neck supple. No rigidity or tenderness.      Right knee: Tenderness present over the medial joint line.      Left knee: Normal.      Right lower leg: No edema.      Left lower leg: No edema.        Legs:       Comments: Right knee pain and tenderness.    Skin:     General: Skin is warm and dry.      Capillary Refill: Capillary refill takes less than 2 seconds.      Findings: No bruising, lesion or rash.   Neurological:      Mental Status: She is alert and oriented to person, place, and time. Mental status is at baseline.      Motor: No weakness.      Coordination: Coordination normal.   Psychiatric:         Mood and Affect: Mood normal.         Behavior: Behavior normal.         Thought Content: Thought content normal.         Judgment: Judgment normal.           Assessment:       1. Acute pain of right knee    2. Pure hypercholesterolemia           Plan:       Acute pain of right knee  -     X-Ray Knee 3 View Right; Future; Expected date: 05/27/2022  -     Sedimentation rate; Future; Expected date: 05/27/2022  -     C-reactive protein; Future; Expected date: 05/27/2022    Pure hypercholesterolemia  -     atorvastatin (LIPITOR) 20 MG tablet; Take 1 tablet (20 mg total) by mouth once daily.  Dispense: 90 tablet; Refill: 3      Follow up if symptoms worsen or fail to improve.      Patient instructed to have ultrasound done as soon as possible. She will have blood work drawn to rule out inflammatory processes for acute pain. She is advised to apply heat  to area when in pain and to avoid OTC NSAIDs  Due to her actively trying to conceive. Based on results of imaging I will determine if further referral is warranted.     5/27/2022 Thank you for allowing me to participate in your care.        JOHANNY Emerson, FNP-C

## 2022-05-31 ENCOUNTER — TELEPHONE (OUTPATIENT)
Dept: FAMILY MEDICINE | Facility: CLINIC | Age: 32
End: 2022-05-31

## 2022-05-31 NOTE — TELEPHONE ENCOUNTER
----- Message from GUILHERME Ford sent at 5/27/2022 11:57 AM CDT -----  Labs and X-Ray Normal. Please treat as discussed in clinic.

## 2022-10-29 ENCOUNTER — OFFICE VISIT (OUTPATIENT)
Dept: URGENT CARE | Facility: CLINIC | Age: 32
End: 2022-10-29
Payer: MEDICAID

## 2022-10-29 VITALS
WEIGHT: 127 LBS | DIASTOLIC BLOOD PRESSURE: 58 MMHG | BODY MASS INDEX: 23.37 KG/M2 | SYSTOLIC BLOOD PRESSURE: 93 MMHG | OXYGEN SATURATION: 97 % | TEMPERATURE: 98 F | HEART RATE: 67 BPM | HEIGHT: 62 IN | RESPIRATION RATE: 16 BRPM

## 2022-10-29 DIAGNOSIS — Z86.79 HISTORY OF HYPOTENSION: ICD-10-CM

## 2022-10-29 DIAGNOSIS — Z86.2 HISTORY OF ANEMIA: ICD-10-CM

## 2022-10-29 DIAGNOSIS — H65.90 FLUID COLLECTION OF MIDDLE EAR: Primary | ICD-10-CM

## 2022-10-29 DIAGNOSIS — Z86.16 PERSONAL HISTORY OF COVID-19: ICD-10-CM

## 2022-10-29 DIAGNOSIS — J30.9 ALLERGIC RHINITIS, UNSPECIFIED SEASONALITY, UNSPECIFIED TRIGGER: ICD-10-CM

## 2022-10-29 PROCEDURE — 99213 PR OFFICE/OUTPT VISIT, EST, LEVL III, 20-29 MIN: ICD-10-PCS | Mod: S$GLB,,, | Performed by: NURSE PRACTITIONER

## 2022-10-29 PROCEDURE — 1160F RVW MEDS BY RX/DR IN RCRD: CPT | Mod: CPTII,S$GLB,, | Performed by: NURSE PRACTITIONER

## 2022-10-29 PROCEDURE — 1159F MED LIST DOCD IN RCRD: CPT | Mod: CPTII,S$GLB,, | Performed by: NURSE PRACTITIONER

## 2022-10-29 PROCEDURE — 1160F PR REVIEW ALL MEDS BY PRESCRIBER/CLIN PHARMACIST DOCUMENTED: ICD-10-PCS | Mod: CPTII,S$GLB,, | Performed by: NURSE PRACTITIONER

## 2022-10-29 PROCEDURE — 3074F PR MOST RECENT SYSTOLIC BLOOD PRESSURE < 130 MM HG: ICD-10-PCS | Mod: CPTII,S$GLB,, | Performed by: NURSE PRACTITIONER

## 2022-10-29 PROCEDURE — 3078F DIAST BP <80 MM HG: CPT | Mod: CPTII,S$GLB,, | Performed by: NURSE PRACTITIONER

## 2022-10-29 PROCEDURE — 3078F PR MOST RECENT DIASTOLIC BLOOD PRESSURE < 80 MM HG: ICD-10-PCS | Mod: CPTII,S$GLB,, | Performed by: NURSE PRACTITIONER

## 2022-10-29 PROCEDURE — 1159F PR MEDICATION LIST DOCUMENTED IN MEDICAL RECORD: ICD-10-PCS | Mod: CPTII,S$GLB,, | Performed by: NURSE PRACTITIONER

## 2022-10-29 PROCEDURE — 99213 OFFICE O/P EST LOW 20 MIN: CPT | Mod: S$GLB,,, | Performed by: NURSE PRACTITIONER

## 2022-10-29 PROCEDURE — 3074F SYST BP LT 130 MM HG: CPT | Mod: CPTII,S$GLB,, | Performed by: NURSE PRACTITIONER

## 2022-10-29 RX ORDER — CETIRIZINE HYDROCHLORIDE 10 MG/1
10 TABLET ORAL DAILY
Qty: 30 TABLET | Refills: 0 | Status: SHIPPED | OUTPATIENT
Start: 2022-10-29

## 2022-10-29 RX ORDER — FLUTICASONE PROPIONATE 50 MCG
1 SPRAY, SUSPENSION (ML) NASAL DAILY
Qty: 18.2 ML | Refills: 0 | Status: SHIPPED | OUTPATIENT
Start: 2022-10-29

## 2022-10-29 NOTE — PROGRESS NOTES
"Subjective:       Patient ID: Telma Greenberg is a 31 y.o. female.    Vitals:  height is 5' 2" (1.575 m) and weight is 57.6 kg (127 lb). Her temperature is 98 °F (36.7 °C). Her blood pressure is 93/58 (abnormal) and her pulse is 67. Her respiration is 16 and oxygen saturation is 97%.     Chief Complaint: Dizziness    Pt states she thinks she has vertigo or fluid in ears. Symptoms include light headedness, drainage from ears. States dizziness is a rocking sensation. X 1-2 weeks. She had Covid 3 weeks ago.    HENT:  Positive for postnasal drip.    Neurological:  Positive for dizziness.     Objective:      Physical Exam   Constitutional: She is oriented to person, place, and time. normal  HENT:   Head: Normocephalic and atraumatic.   Ears:   Right Ear: Hearing, external ear and ear canal normal. A middle ear effusion is present.   Left Ear: Hearing, external ear and ear canal normal. A middle ear effusion is present.   Nose: Nose normal.   Mouth/Throat: Mucous membranes are moist.   Eyes: Conjunctivae are normal. Extraocular movement intact   Neck: Neck supple.   Cardiovascular: Normal rate, regular rhythm, normal heart sounds and normal pulses.   Pulmonary/Chest: Effort normal and breath sounds normal.   Abdominal: Normal appearance. Soft.   Musculoskeletal: Normal range of motion.         General: Normal range of motion.   Neurological: She is alert and oriented to person, place, and time.   Skin: Skin is warm and dry. Capillary refill takes 2 to 3 seconds.   Psychiatric: Her behavior is normal. Mood normal.   Nursing note and vitals reviewed.      Assessment:       1. Fluid collection of middle ear    2. Personal history of COVID-19    3. Allergic rhinitis, unspecified seasonality, unspecified trigger    4. History of anemia    5. History of hypotension          Plan:       Patient is 3 weeks post covid with c/o dizziness when she moves her head quickly. She has small bilateral middle ear effusion and is afebrile. Will " treat with anti-histamine and avoid sudafed at this time due to cardiac history. She has PMH of anemia and hypotension which could be contributing to her symptoms. She agrees to follow up with her PCP if her symptoms do not improve.  Fluid collection of middle ear  -     cetirizine (ZYRTEC) 10 MG tablet; Take 1 tablet (10 mg total) by mouth once daily.  Dispense: 30 tablet; Refill: 0  -     fluticasone propionate (FLONASE) 50 mcg/actuation nasal spray; 1 spray (50 mcg total) by Each Nostril route once daily.  Dispense: 18.2 mL; Refill: 0    Personal history of COVID-19    Allergic rhinitis, unspecified seasonality, unspecified trigger  -     fluticasone propionate (FLONASE) 50 mcg/actuation nasal spray; 1 spray (50 mcg total) by Each Nostril route once daily.  Dispense: 18.2 mL; Refill: 0    History of anemia    History of hypotension       Zyrtec 10mg daily in the evening   Flonase 1 spray to each nostril daily   Follow up with your PCP for further evaluation of dizziness does not improve

## 2022-10-29 NOTE — PATIENT INSTRUCTIONS
Zyrtec 10mg daily in the evening   Flonase 1 spray to each nostril daily   Follow up with your PCP for further evaluation of dizziness does not improve

## 2022-11-07 ENCOUNTER — OFFICE VISIT (OUTPATIENT)
Dept: FAMILY MEDICINE | Facility: CLINIC | Age: 32
End: 2022-11-07
Payer: MEDICAID

## 2022-11-07 VITALS
WEIGHT: 127 LBS | DIASTOLIC BLOOD PRESSURE: 52 MMHG | BODY MASS INDEX: 23.37 KG/M2 | OXYGEN SATURATION: 96 % | HEART RATE: 76 BPM | TEMPERATURE: 98 F | SYSTOLIC BLOOD PRESSURE: 100 MMHG | HEIGHT: 62 IN

## 2022-11-07 DIAGNOSIS — Z00.00 ROUTINE HEALTH MAINTENANCE: ICD-10-CM

## 2022-11-07 DIAGNOSIS — D64.9 MILD ANEMIA: ICD-10-CM

## 2022-11-07 DIAGNOSIS — I95.9 HYPOTENSION, UNSPECIFIED HYPOTENSION TYPE: Primary | ICD-10-CM

## 2022-11-07 PROCEDURE — 3008F BODY MASS INDEX DOCD: CPT | Mod: CPTII,,, | Performed by: NURSE PRACTITIONER

## 2022-11-07 PROCEDURE — 1159F PR MEDICATION LIST DOCUMENTED IN MEDICAL RECORD: ICD-10-PCS | Mod: CPTII,,, | Performed by: NURSE PRACTITIONER

## 2022-11-07 PROCEDURE — 3074F PR MOST RECENT SYSTOLIC BLOOD PRESSURE < 130 MM HG: ICD-10-PCS | Mod: CPTII,,, | Performed by: NURSE PRACTITIONER

## 2022-11-07 PROCEDURE — 1160F RVW MEDS BY RX/DR IN RCRD: CPT | Mod: CPTII,,, | Performed by: NURSE PRACTITIONER

## 2022-11-07 PROCEDURE — 1160F PR REVIEW ALL MEDS BY PRESCRIBER/CLIN PHARMACIST DOCUMENTED: ICD-10-PCS | Mod: CPTII,,, | Performed by: NURSE PRACTITIONER

## 2022-11-07 PROCEDURE — 3078F DIAST BP <80 MM HG: CPT | Mod: CPTII,,, | Performed by: NURSE PRACTITIONER

## 2022-11-07 PROCEDURE — 3074F SYST BP LT 130 MM HG: CPT | Mod: CPTII,,, | Performed by: NURSE PRACTITIONER

## 2022-11-07 PROCEDURE — 1159F MED LIST DOCD IN RCRD: CPT | Mod: CPTII,,, | Performed by: NURSE PRACTITIONER

## 2022-11-07 PROCEDURE — 99214 OFFICE O/P EST MOD 30 MIN: CPT | Mod: S$PBB,,, | Performed by: NURSE PRACTITIONER

## 2022-11-07 PROCEDURE — 3008F PR BODY MASS INDEX (BMI) DOCUMENTED: ICD-10-PCS | Mod: CPTII,,, | Performed by: NURSE PRACTITIONER

## 2022-11-07 PROCEDURE — 99215 OFFICE O/P EST HI 40 MIN: CPT | Performed by: NURSE PRACTITIONER

## 2022-11-07 PROCEDURE — 3078F PR MOST RECENT DIASTOLIC BLOOD PRESSURE < 80 MM HG: ICD-10-PCS | Mod: CPTII,,, | Performed by: NURSE PRACTITIONER

## 2022-11-07 PROCEDURE — 99214 PR OFFICE/OUTPT VISIT, EST, LEVL IV, 30-39 MIN: ICD-10-PCS | Mod: S$PBB,,, | Performed by: NURSE PRACTITIONER

## 2022-11-07 RX ORDER — MIDODRINE HYDROCHLORIDE 2.5 MG/1
2.5 TABLET ORAL 2 TIMES DAILY WITH MEALS
Qty: 60 TABLET | Refills: 5 | Status: SHIPPED | OUTPATIENT
Start: 2022-11-07

## 2022-11-08 NOTE — PROGRESS NOTES
"      SUBJECTIVE:      Patient ID: Telma Greenberg is a 31 y.o. female.    Chief Complaint: Hypotension and light headed    Presents for problem visit, last seen 10/2021, states she has been having issues with her BP running low 90s-100s/50s-60s, feeling symptomatic during these times: L occipital HA, lightheaded, nauseous, "electric shocks" on her LUE & chest, admits she has not followed back up with cardiology for additional testing & didn't take the meds which were recommended at her last visit, went to  for symptoms and was told to increase her salt intake, states she has been drinking pickle juice & gatorade and adding salt to her meals but doesn't feel it is helping    Discussed outstanding health maintenance     Nausea  This is a recurrent problem. The current episode started 1 to 4 weeks ago. The problem occurs daily. The problem has been waxing and waning. Associated symptoms include fatigue, headaches, nausea and weakness. Pertinent negatives include no abdominal pain, arthralgias, chest pain, congestion, coughing, joint swelling, myalgias, neck pain, numbness, sore throat or vomiting. The symptoms are aggravated by bending, exertion and standing. She has tried rest (pickle juice, gatorade, adding salt to meals) for the symptoms. The treatment provided no relief.     History reviewed. No pertinent surgical history.  Family History   Problem Relation Age of Onset    Breast cancer Maternal Grandmother     Rectal cancer Maternal Grandmother     Lung cancer Father         smoker    Pneumonia Mother         smoker    Stroke Brother     No Known Problems Brother     Alcohol abuse Brother     Alcohol abuse Brother     Alzheimer's disease Paternal Grandmother     Colon cancer Neg Hx     Ovarian cancer Neg Hx       Social History     Socioeconomic History    Marital status: Single    Number of children: 3   Occupational History    Occupation:     Tobacco Use    Smoking status: Never    Smokeless tobacco: " Never   Substance and Sexual Activity    Alcohol use: No    Drug use: No    Sexual activity: Yes     Partners: Male     Current Outpatient Medications   Medication Sig Dispense Refill    ascorbic acid, vitamin C, (VITAMIN C) 100 MG tablet Take 100 mg by mouth once daily.      atorvastatin (LIPITOR) 20 MG tablet Take 1 tablet (20 mg total) by mouth once daily. 90 tablet 3    biotin 10,000 mcg TbDL Take by mouth.      cetirizine (ZYRTEC) 10 MG tablet Take 1 tablet (10 mg total) by mouth once daily. 30 tablet 0    clopidogreL (PLAVIX) 75 mg tablet Take 75 mg by mouth once daily.      cyanocobalamin (VITAMIN B-12) 100 MCG tablet Take 100 mcg by mouth once daily.      famotidine (PEPCID) 20 MG tablet Take 20 mg by mouth 2 (two) times daily as needed.      fluticasone propionate (FLONASE) 50 mcg/actuation nasal spray 1 spray (50 mcg total) by Each Nostril route once daily. 18.2 mL 0    icosapent ethyL (VASCEPA) 1 gram Cap Take 2 g by mouth 2 (two) times daily.      Lactobacillus rhamnosus GG (CULTURELLE) 10 billion cell capsule Take 1 capsule by mouth once daily.      multivitamin (THERAGRAN) per tablet Take 1 tablet by mouth once daily.      omega-3 fatty acids/fish oil (FISH OIL-OMEGA-3 FATTY ACIDS) 300-1,000 mg capsule Take 1 capsule by mouth once daily.      pyridoxine, vitamin B6, (B-6) 100 MG Tab Take 50 mg by mouth once daily.      rimegepant (NURTEC) 75 mg odt 1 tablet on the tongue and allow to dissolve  as needed      vitamin D (VITAMIN D3) 1000 units Tab Take 5,000 Units by mouth once daily.      midodrine (PROAMATINE) 2.5 MG Tab Take 1 tablet (2.5 mg total) by mouth 2 (two) times daily with meals. 60 tablet 5     No current facility-administered medications for this visit.     Review of patient's allergies indicates:  No Known Allergies   Past Medical History:   Diagnosis Date    Abnormal Pap smear of cervix 06/2014    colpo done no bx bc was pregnant    Depression     1st pregnancy    HPV in female      "Hyperlipidemia     Hypotension     Migraines      History reviewed. No pertinent surgical history.    Review of Systems   Constitutional:  Positive for fatigue. Negative for activity change, appetite change and unexpected weight change.   HENT:  Negative for congestion, ear pain, hearing loss, postnasal drip, sinus pressure, sinus pain, sneezing and sore throat.    Eyes:  Negative for photophobia and pain.   Respiratory:  Negative for cough, chest tightness, shortness of breath and wheezing.    Cardiovascular:  Negative for chest pain, palpitations and leg swelling.   Gastrointestinal:  Positive for nausea. Negative for abdominal distention, abdominal pain, blood in stool, constipation, diarrhea and vomiting.   Endocrine: Negative for cold intolerance, heat intolerance, polydipsia and polyuria.   Genitourinary:  Negative for difficulty urinating, dysuria, flank pain, frequency, hematuria, pelvic pain and urgency.   Musculoskeletal:  Negative for arthralgias, back pain, joint swelling, myalgias and neck pain.   Skin:  Negative for pallor.   Allergic/Immunologic: Negative for environmental allergies and food allergies.   Neurological:  Positive for weakness, light-headedness and headaches. Negative for dizziness and numbness.   Hematological:  Does not bruise/bleed easily.   Psychiatric/Behavioral:  Negative for agitation, confusion, decreased concentration and sleep disturbance. The patient is not nervous/anxious.     OBJECTIVE:      Vitals:    11/07/22 1311   BP: (!) 100/52   BP Location: Right arm   Patient Position: Sitting   BP Method: Medium (Manual)   Pulse: 76   Temp: 98.4 °F (36.9 °C)   SpO2: 96%   Weight: 57.6 kg (127 lb)   Height: 5' 2" (1.575 m)     Physical Exam  Vitals and nursing note reviewed.   Constitutional:       General: She is not in acute distress.     Appearance: Normal appearance. She is well-developed and normal weight.   HENT:      Head: Normocephalic and atraumatic.      Right Ear: Hearing " normal.      Left Ear: Hearing normal.      Nose: Nose normal. No rhinorrhea.   Eyes:      General: Lids are normal.         Right eye: No discharge.         Left eye: No discharge.      Conjunctiva/sclera: Conjunctivae normal.      Right eye: Right conjunctiva is not injected.      Left eye: Left conjunctiva is not injected.      Pupils: Pupils are equal, round, and reactive to light. Pupils are equal.      Right eye: Pupil is round and reactive.      Left eye: Pupil is round and reactive.   Neck:      Thyroid: No thyromegaly.      Vascular: No JVD.      Trachea: Trachea normal. No tracheal deviation.   Cardiovascular:      Rate and Rhythm: Normal rate and regular rhythm.      Pulses:           Radial pulses are 2+ on the right side and 2+ on the left side.        Posterior tibial pulses are 2+ on the right side and 2+ on the left side.      Heart sounds: Normal heart sounds. No murmur heard.    No friction rub. No gallop.   Pulmonary:      Effort: Pulmonary effort is normal. No respiratory distress.      Breath sounds: Normal breath sounds. No stridor. No decreased breath sounds, wheezing, rhonchi or rales.   Abdominal:      General: Bowel sounds are normal. There is no distension.      Palpations: Abdomen is soft. Abdomen is not rigid.      Tenderness: There is no abdominal tenderness. There is no guarding.   Musculoskeletal:         General: Normal range of motion.      Cervical back: Normal range of motion and neck supple.   Lymphadenopathy:      Cervical: No cervical adenopathy.   Skin:     General: Skin is warm and dry.      Capillary Refill: Capillary refill takes less than 2 seconds.      Coloration: Skin is not pale.      Findings: No lesion or rash.   Neurological:      Mental Status: She is alert and oriented to person, place, and time.      GCS: GCS eye subscore is 4. GCS verbal subscore is 5. GCS motor subscore is 6.      Motor: Motor function is intact. No atrophy.      Coordination: Coordination  normal.      Gait: Gait normal.   Psychiatric:         Attention and Perception: Attention normal. She is attentive.         Mood and Affect: Mood and affect normal.         Speech: Speech normal.         Behavior: Behavior normal.         Thought Content: Thought content normal.         Cognition and Memory: Cognition normal.         Judgment: Judgment normal.      Assessment:       1. Hypotension, unspecified hypotension type    2. Mild anemia    3. Routine health maintenance          Plan:       Hypotension, unspecified hypotension type  -     CBC Auto Differential; Future; Expected date: 11/14/2022  -     Comprehensive Metabolic Panel; Future; Expected date: 11/07/2022  -     TSH; Future; Expected date: 11/07/2022  -     T4, Free; Future; Expected date: 11/07/2022  -     midodrine (PROAMATINE) 2.5 MG Tab; Take 1 tablet (2.5 mg total) by mouth 2 (two) times daily with meals.  Dispense: 60 tablet; Refill: 5    Mild anemia  -     CBC Auto Differential; Future; Expected date: 11/14/2022  -     Vitamin B12; Future; Expected date: 11/07/2022  -     Folate; Future; Expected date: 11/07/2022  -     Ferritin; Future; Expected date: 11/07/2022  -     Iron; Future; Expected date: 11/07/2022    Routine health maintenance  -     CBC Auto Differential; Future; Expected date: 11/14/2022  -     Comprehensive Metabolic Panel; Future; Expected date: 11/07/2022  -     Lipid Panel; Future; Expected date: 11/07/2022  -     TSH; Future; Expected date: 11/07/2022        Follow up in about 6 weeks (around 12/19/2022) for lab review.      11/8/2022 JOHANNY Dang, FNP-C

## 2022-12-12 ENCOUNTER — PATIENT MESSAGE (OUTPATIENT)
Dept: FAMILY MEDICINE | Facility: CLINIC | Age: 32
End: 2022-12-12

## 2023-01-30 ENCOUNTER — PATIENT MESSAGE (OUTPATIENT)
Dept: FAMILY MEDICINE | Facility: CLINIC | Age: 33
End: 2023-01-30

## 2024-01-19 ENCOUNTER — TELEPHONE (OUTPATIENT)
Dept: CARDIOLOGY | Facility: CLINIC | Age: 34
End: 2024-01-19
Payer: COMMERCIAL

## 2024-01-19 NOTE — TELEPHONE ENCOUNTER
----- Message from Terra Vang, Patient Care Assistant sent at 1/19/2024  3:39 PM CST -----  Regarding: appointment  Contact: pt  Type:  Sooner Appointment Request    Caller is requesting a sooner appointment.  Caller declined first available appointment listed below.  Caller will not accept being placed on the waitlist and is requesting a message be sent to doctor.    Name of Caller:  pt     When is the first available appointment?  2024    Symptoms:  new pt est care     Would the patient rather a call back or a response via [facility]? Callback     Best Call Back Number:  [phone] (home)     Additional Information:  please call to advise. Thanks!

## 2024-01-31 ENCOUNTER — TELEPHONE (OUTPATIENT)
Dept: CARDIOLOGY | Facility: CLINIC | Age: 34
End: 2024-01-31
Payer: COMMERCIAL

## 2024-01-31 NOTE — TELEPHONE ENCOUNTER
----- Message from Terra Vang Patient Care Assistant sent at 1/31/2024  9:54 AM CST -----  Regarding: appointment  Contact: pt  Type:  Sooner Appointment Request    Caller is requesting a sooner appointment.  Caller declined first available appointment listed below.  Caller will not accept being placed on the waitlist and is requesting a message be sent to doctor.    Name of Caller:  pt     When is the first available appointment?  2024    Symptoms:  new pt est care     Would the patient rather a call back or a response via [facility]? Callback     Best Call Back Number:  [phone] (home)     Additional Information:  please call to advise. Thanks!

## 2024-04-17 ENCOUNTER — OFFICE VISIT (OUTPATIENT)
Dept: FAMILY MEDICINE | Facility: CLINIC | Age: 34
End: 2024-04-17
Payer: COMMERCIAL

## 2024-04-17 VITALS
WEIGHT: 137 LBS | BODY MASS INDEX: 25.21 KG/M2 | HEART RATE: 82 BPM | OXYGEN SATURATION: 99 % | HEIGHT: 62 IN | DIASTOLIC BLOOD PRESSURE: 70 MMHG | SYSTOLIC BLOOD PRESSURE: 104 MMHG

## 2024-04-17 DIAGNOSIS — E78.00 PURE HYPERCHOLESTEROLEMIA: ICD-10-CM

## 2024-04-17 DIAGNOSIS — G43.109 MIGRAINE WITH AURA AND WITHOUT STATUS MIGRAINOSUS, NOT INTRACTABLE: ICD-10-CM

## 2024-04-17 DIAGNOSIS — Z76.89 ENCOUNTER TO ESTABLISH CARE WITH NEW DOCTOR: Primary | ICD-10-CM

## 2024-04-17 DIAGNOSIS — I95.9 HYPOTENSION, UNSPECIFIED HYPOTENSION TYPE: ICD-10-CM

## 2024-04-17 PROCEDURE — 1159F MED LIST DOCD IN RCRD: CPT | Mod: CPTII,S$GLB,, | Performed by: FAMILY MEDICINE

## 2024-04-17 PROCEDURE — 3008F BODY MASS INDEX DOCD: CPT | Mod: CPTII,S$GLB,, | Performed by: FAMILY MEDICINE

## 2024-04-17 PROCEDURE — 3078F DIAST BP <80 MM HG: CPT | Mod: CPTII,S$GLB,, | Performed by: FAMILY MEDICINE

## 2024-04-17 PROCEDURE — 1160F RVW MEDS BY RX/DR IN RCRD: CPT | Mod: CPTII,S$GLB,, | Performed by: FAMILY MEDICINE

## 2024-04-17 PROCEDURE — 99214 OFFICE O/P EST MOD 30 MIN: CPT | Mod: S$GLB,,, | Performed by: FAMILY MEDICINE

## 2024-04-17 PROCEDURE — 3074F SYST BP LT 130 MM HG: CPT | Mod: CPTII,S$GLB,, | Performed by: FAMILY MEDICINE

## 2024-04-17 RX ORDER — BUTALBITAL, ACETAMINOPHEN AND CAFFEINE 50; 325; 40 MG/1; MG/1; MG/1
1 TABLET ORAL EVERY 6 HOURS PRN
Qty: 30 TABLET | Refills: 1 | Status: SHIPPED | OUTPATIENT
Start: 2024-04-17

## 2024-04-17 RX ORDER — PROGESTERONE 100 MG/1
200 CAPSULE ORAL DAILY
COMMUNITY

## 2024-04-17 RX ORDER — NAPROXEN SODIUM 220 MG/1
81 TABLET, FILM COATED ORAL DAILY
COMMUNITY

## 2024-04-17 NOTE — PROGRESS NOTES
SUBJECTIVE:    Patient ID: Telma Greenberg is a 33 y.o. female.    Chief Complaint: Establish Care    Patient presents to establish care with new physician secondary to insurance changes she She has h/o hypotension and hyperlipidemia in his also establishing care with Dr. Hickey.  She will see him next week.  She has discontinued several of her medications as she is attempting to become pregnant.  She is now on prenatal vitamins and progesterone.  Does also continue to take a baby aspirin.    Since she is no longer able to take midodrine she is using IV hydration and Gatorade several times a day to maintain her blood pressures.  Thus far she is doing well.      She also has a history of migraine headaches and has had discontinue these medications.  Thus far she has been headache free.    She  has had recent lab work with her insurance company.  She will be forwarding those results to my office        Past Medical History:   Diagnosis Date    Abnormal Pap smear of cervix 06/2014    colpo done no bx bc was pregnant    Depression     1st pregnancy    HPV in female     Hyperlipidemia     Hypotension     Migraines      No past surgical history on file.  Family History   Problem Relation Name Age of Onset    Breast cancer Maternal Grandmother      Rectal cancer Maternal Grandmother      Lung cancer Father          smoker    Pneumonia Mother          smoker    Stroke Brother      No Known Problems Brother      Alcohol abuse Brother      Alcohol abuse Brother      Alzheimer's disease Paternal Grandmother      Colon cancer Neg Hx      Ovarian cancer Neg Hx         Marital Status: Single  Alcohol History:  reports no history of alcohol use.  Tobacco History:  reports that she has never smoked. She has never used smokeless tobacco.  Drug History:  reports no history of drug use.    Review of patient's allergies indicates:  No Known Allergies    Current Outpatient Medications:     aspirin 81 MG Chew, Take 81 mg by mouth once  "daily., Disp: , Rfl:     prenatal 25/iron fum/folic/dha (PRENATAL-1 ORAL), Take by mouth Daily., Disp: , Rfl:     progesterone (PROMETRIUM) 100 MG capsule, Take 200 mg by mouth once daily., Disp: , Rfl:     butalbital-acetaminophen-caffeine -40 mg (FIORICET, ESGIC) -40 mg per tablet, Take 1 tablet by mouth every 6 (six) hours as needed for Pain or Headaches., Disp: 30 tablet, Rfl: 1    Review of Systems   Constitutional:  Negative for activity change, fatigue and unexpected weight change.   HENT:  Negative for hearing loss, postnasal drip, sinus pressure, sore throat and voice change.    Eyes:  Negative for photophobia and visual disturbance.   Respiratory:  Negative for cough, shortness of breath and wheezing.    Cardiovascular:  Negative for chest pain and palpitations.   Gastrointestinal:  Negative for constipation, diarrhea and nausea.   Genitourinary:  Negative for difficulty urinating, frequency, hematuria and urgency.   Musculoskeletal:  Negative for arthralgias and back pain.   Skin:  Negative for rash.   Neurological:  Negative for weakness, light-headedness and headaches.   Hematological:  Negative for adenopathy. Does not bruise/bleed easily.   Psychiatric/Behavioral:  The patient is not nervous/anxious.           Objective:          11/7/2022     1:11 PM 4/17/2024     4:27 PM   Vitals - 1 value per visit   SYSTOLIC 100 104   DIASTOLIC 52 70   Pulse 76 82   Temp 98.4 °F (36.9 °C)    SPO2 96 % 99 %   Weight (lb) 127 137   Weight (kg) 57.607 62.143   Height 5' 2" (1.575 m) 5' 2" (1.575 m)   BMI (Calculated) 23.2 25.1   Pain Score Two       Physical Exam  Constitutional:       Appearance: Normal appearance.   HENT:      Head: Normocephalic and atraumatic.      Mouth/Throat:      Mouth: Mucous membranes are moist.   Eyes:      Conjunctiva/sclera: Conjunctivae normal.   Cardiovascular:      Rate and Rhythm: Normal rate.      Heart sounds: Murmur heard.   Pulmonary:      Effort: Pulmonary effort is " normal.   Neurological:      General: No focal deficit present.      Mental Status: She is alert and oriented to person, place, and time.   Psychiatric:         Mood and Affect: Mood normal.         Behavior: Behavior normal.           Assessment:       1. Encounter to establish care with new doctor    2. Hypotension, unspecified hypotension type    3. Pure hypercholesterolemia    4. Migraine with aura and without status migrainosus, not intractable         Plan:       Encounter to establish care with new doctor    Hypotension, unspecified hypotension type  Continue electrolyte therapy    Pure hypercholesterolemia  Hold statin    Migraine with aura and without status migrainosus, not intractable  -     butalbital-acetaminophen-caffeine -40 mg (FIORICET, ESGIC) -40 mg per tablet; Take 1 tablet by mouth every 6 (six) hours as needed for Pain or Headaches.  Dispense: 30 tablet; Refill: 1  Safe in preconception period    Medication List with Changes/Refills   New Medications    BUTALBITAL-ACETAMINOPHEN-CAFFEINE -40 MG (FIORICET, ESGIC) -40 MG PER TABLET    Take 1 tablet by mouth every 6 (six) hours as needed for Pain or Headaches.   Current Medications    ASPIRIN 81 MG CHEW    Take 81 mg by mouth once daily.    PRENATAL 25/IRON FUM/FOLIC/DHA (PRENATAL-1 ORAL)    Take by mouth Daily.    PROGESTERONE (PROMETRIUM) 100 MG CAPSULE    Take 200 mg by mouth once daily.   Discontinued Medications    ASCORBIC ACID, VITAMIN C, (VITAMIN C) 100 MG TABLET    Take 100 mg by mouth once daily.    ATORVASTATIN (LIPITOR) 20 MG TABLET    Take 1 tablet (20 mg total) by mouth once daily.    BIOTIN 10,000 MCG TBDL    Take by mouth.    CETIRIZINE (ZYRTEC) 10 MG TABLET    Take 1 tablet (10 mg total) by mouth once daily.    CLOPIDOGREL (PLAVIX) 75 MG TABLET    Take 75 mg by mouth once daily.    CYANOCOBALAMIN (VITAMIN B-12) 100 MCG TABLET    Take 100 mcg by mouth once daily.    FAMOTIDINE (PEPCID) 20 MG TABLET    Take 20  mg by mouth 2 (two) times daily as needed.    FLUTICASONE PROPIONATE (FLONASE) 50 MCG/ACTUATION NASAL SPRAY    1 spray (50 mcg total) by Each Nostril route once daily.    ICOSAPENT ETHYL (VASCEPA) 1 GRAM CAP    Take 2 g by mouth 2 (two) times daily.    LACTOBACILLUS RHAMNOSUS GG (CULTURELLE) 10 BILLION CELL CAPSULE    Take 1 capsule by mouth once daily.    MIDODRINE (PROAMATINE) 2.5 MG TAB    Take 1 tablet (2.5 mg total) by mouth 2 (two) times daily with meals.    MULTIVITAMIN (THERAGRAN) PER TABLET    Take 1 tablet by mouth once daily.    OMEGA-3 FATTY ACIDS/FISH OIL (FISH OIL-OMEGA-3 FATTY ACIDS) 300-1,000 MG CAPSULE    Take 1 capsule by mouth once daily.    PYRIDOXINE, VITAMIN B6, (B-6) 100 MG TAB    Take 50 mg by mouth once daily.    RIMEGEPANT (NURTEC) 75 MG ODT    1 tablet on the tongue and allow to dissolve  as needed    VITAMIN D (VITAMIN D3) 1000 UNITS TAB    Take 5,000 Units by mouth once daily.      Follow up in about 6 months (around 10/17/2024).

## 2024-04-18 ENCOUNTER — OFFICE VISIT (OUTPATIENT)
Dept: CARDIOLOGY | Facility: CLINIC | Age: 34
End: 2024-04-18
Payer: COMMERCIAL

## 2024-04-18 VITALS
HEIGHT: 62 IN | RESPIRATION RATE: 16 BRPM | DIASTOLIC BLOOD PRESSURE: 60 MMHG | SYSTOLIC BLOOD PRESSURE: 112 MMHG | HEART RATE: 86 BPM | WEIGHT: 135 LBS | OXYGEN SATURATION: 98 % | BODY MASS INDEX: 24.84 KG/M2

## 2024-04-18 DIAGNOSIS — Q21.12 PFO (PATENT FORAMEN OVALE): ICD-10-CM

## 2024-04-18 DIAGNOSIS — R55 VASOVAGAL SYNCOPE: Primary | ICD-10-CM

## 2024-04-18 DIAGNOSIS — F41.9 ANXIETY AND DEPRESSION: ICD-10-CM

## 2024-04-18 DIAGNOSIS — F32.A ANXIETY AND DEPRESSION: ICD-10-CM

## 2024-04-18 PROCEDURE — 99999 PR PBB SHADOW E&M-EST. PATIENT-LVL III: CPT | Mod: PBBFAC,,, | Performed by: INTERNAL MEDICINE

## 2024-04-18 PROCEDURE — 1159F MED LIST DOCD IN RCRD: CPT | Mod: CPTII,S$GLB,, | Performed by: INTERNAL MEDICINE

## 2024-04-18 PROCEDURE — 1160F RVW MEDS BY RX/DR IN RCRD: CPT | Mod: CPTII,S$GLB,, | Performed by: INTERNAL MEDICINE

## 2024-04-18 PROCEDURE — 99204 OFFICE O/P NEW MOD 45 MIN: CPT | Mod: S$GLB,,, | Performed by: INTERNAL MEDICINE

## 2024-04-18 PROCEDURE — 93000 ELECTROCARDIOGRAM COMPLETE: CPT | Mod: S$GLB,,, | Performed by: INTERNAL MEDICINE

## 2024-04-18 PROCEDURE — 3008F BODY MASS INDEX DOCD: CPT | Mod: CPTII,S$GLB,, | Performed by: INTERNAL MEDICINE

## 2024-04-18 PROCEDURE — 3078F DIAST BP <80 MM HG: CPT | Mod: CPTII,S$GLB,, | Performed by: INTERNAL MEDICINE

## 2024-04-18 PROCEDURE — 3074F SYST BP LT 130 MM HG: CPT | Mod: CPTII,S$GLB,, | Performed by: INTERNAL MEDICINE

## 2024-04-18 NOTE — ASSESSMENT & PLAN NOTE
She appears clinically stable I have encouraged her to increase the fluid intake wear support stockings knee-high lots of salt and avoid any caffeinated beverages and lots of fluids especially water.  Avoid any stimulants.  Currently she is on IVF program at this time with recent implant so these conservative measures would also help.

## 2024-04-18 NOTE — PROGRESS NOTES
Subjective:    Patient ID:  Telma Greenberg is a 33 y.o. female patient here for evaluation Establish Care (She is a previous pt of Dr. Capone. She had insurance change and was unable to see him anymore. She had testing done within the last year and all came back normal.)      History of Present Illness:   Patient is a 33-year-old with history of hypotension, migraine headaches is seeking to establish cardiovascular care with me today.  She was previously evaluated by Dr. Capone and other doctors [facility] and apparently her insurance is not covered at this time on the group and she is now seeking cardiac care with us.    Patient denies having any chest discomfort no arm neck or jaw pain she does have some symptoms of vasovagal hypotension in the past and patient was advised to take metoprolol and ProAmatine about 2.5 mg at a time.  She has been off these medications in preparation for IVF and she had implantation done on the 15th.  She is presently on progesterone.    Denies having any dizziness lightheadedness  She would previous 3 full-term normal pregnancies and with a 3rd pregnancy she she had a weak spell after epidural injection and during childbirth apparently she had hypotensive episode associated with bradycardia that resolved with fluids.  She is currently being followed by physicians at at [facility]    Review of patient's allergies indicates:  No Known Allergies    Past Medical History:   Diagnosis Date    Abnormal Pap smear of cervix 06/2014    colpo done no bx bc was pregnant    Depression     1st pregnancy    HPV in female     Hyperlipidemia     Hypotension     Migraines      No past surgical history on file.  Social History     Tobacco Use    Smoking status: Never    Smokeless tobacco: Never   Substance Use Topics    Alcohol use: No     Comment: former , minimal    Drug use: No        Review of Systems   As noted in HPI in addition     Constitutional: Negative for chills, fatigue and  fever.   Eyes: No double vision   occasional blurred vision  Respiratory: Negative for cough, shortness of breath and wheezing.    Cardiovascular: Negative for chest pain. Negative for palpitations and leg swelling.   Gastrointestinal: Negative for abdominal pain, No melena, diarrhea, nausea and vomiting.   Genitourinary: Negative for dysuria and frequency, Negative for hematuria  Skin: Negative for bruising, Negative for edema or discoloration noted.   Endocrine: Negative for polyphagia, Negative for heat intolerance, Negative for cold intolerance  Psychiatric: Negative for depression, Negative for anxiety, Negative for memory loss  Musculoskeletal: Negative for neck pain, Negative for muscle weakness, Negative for back pain    Neuro:  Frequent episodes of migraine headaches noted with numbness in his hands as well as vomiting.       Objective        Vitals:    04/18/24 1427   BP: 112/60   Pulse: 86   Resp: 16       LIPIDS - LAST 2   Lab Results   Component Value Date    CHOL 263 (H) 11/09/2021    HDL 40 (L) 11/09/2021    LDLCALC 196 (H) 11/09/2021    TRIG 128 11/09/2021    CHOLHDL 6.6 (H) 11/09/2021       CBC - LAST 2  Lab Results   Component Value Date    WBC 8.20 02/17/2022    WBC 8.0 11/09/2021    RBC 4.89 02/17/2022    RBC 4.69 11/09/2021    HGB 13.7 02/17/2022    HGB 13.4 11/09/2021    HCT 42.3 02/17/2022    HCT 40.8 11/09/2021    MCV 87 02/17/2022    MCV 87.0 11/09/2021    MCH 28.0 02/17/2022    MCH 28.6 11/09/2021    MCHC 32.4 02/17/2022    MCHC 32.8 11/09/2021    RDW 13.3 02/17/2022    RDW 12.9 11/09/2021     02/17/2022     11/09/2021    MPV 10.5 02/17/2022    MPV 11.3 11/09/2021    GRAN 5.3 02/17/2022    GRAN 64.8 02/17/2022    LYMPH 2.3 02/17/2022    LYMPH 27.8 02/17/2022    MONO 0.5 02/17/2022    MONO 5.5 02/17/2022    BASO 0.02 02/17/2022    BASO 24 11/09/2021    NRBC 0 02/17/2022       CHEMISTRY & LIVER FUNCTION - LAST 2  Lab Results   Component Value Date     02/17/2022      "11/09/2021    K 4.4 02/17/2022    K 4.0 11/09/2021     02/17/2022     11/09/2021    CO2 24 02/17/2022    CO2 26 11/09/2021    ANIONGAP 10 02/17/2022    ANIONGAP 11 10/25/2016    BUN 13 02/17/2022    BUN 13 11/09/2021    CREATININE 0.8 02/17/2022    CREATININE 0.81 11/09/2021    GLU 96 02/17/2022    GLU 76 11/09/2021    CALCIUM 8.9 02/17/2022    CALCIUM 9.2 11/09/2021    ALBUMIN 4.4 02/17/2022    ALBUMIN 4.7 11/09/2021    PROT 7.5 02/17/2022    PROT 7.4 11/09/2021    ALKPHOS 38 (L) 02/17/2022    ALKPHOS 73 10/25/2016    ALT 21 02/17/2022    ALT 12 11/09/2021    AST 24 02/17/2022    AST 15 11/09/2021    BILITOT 0.7 02/17/2022    BILITOT 0.8 11/09/2021        CARDIAC PROFILE - LAST 2  No results found for: "BNP", "CPK", "CPKMB", "LDH", "TROPONINI", "TROPONINIHS"     COAGULATION - LAST 2  Lab Results   Component Value Date    LABPT 12.6 02/17/2022    INR 1.0 02/17/2022    APTT 31.8 02/17/2022       ENDOCRINE & PSA - LAST 2  Lab Results   Component Value Date    TSH 1.92 11/09/2021    TSH 1.528 05/03/2016        ECHOCARDIOGRAM RESULTS  Results for orders placed during the hospital encounter of 02/17/22    Transesophageal echo (STACY)    Interpretation Summary  · The left ventricle is normal in size with normal systolic function.  · Normal left ventricular diastolic function.  · The estimated ejection fraction is 60%.  · Normal right ventricular size with normal right ventricular systolic function.  · There is a patent foramen ovale present with no shunt. Small amount of bubbles crossing the interatrial septum with bubble study consistent with a small PFO.  · No interatrial septal defect present. The interatrial septum was not aneurysmal or floppy  · Normal appearing left atrial appendage.      CURRENT/PREVIOUS VISIT EKG  No results found for this or any previous visit.  No valid procedures specified.   No results found for this or any previous visit.    No valid procedures specified.          PREVIOUS STRESS " TEST              PREVIOUS ANGIOGRAM        PHYSICAL EXAM    GENERAL: well built, well nourished, well-developed in no apparent distress alert and oriented.   HEENT: Normocephalic. Pupils normal and conjunctivae normal.  Mucous membranes normal, no cyanosis or icterus, trachea central,no pallor or icterus is noted..   NECK: No JVD. No bruit..   THYROID: Thyroid not enlarged. No nodules present..   CARDIAC: Regular rate and rhythm. S1 is normal.S2 is normal.No gallops, clicks or murmurs noted at this time.  CHEST ANATOMY: normal.   LUNGS: Clear to auscultation. No wheezing or rhonchi..   ABDOMEN: Soft no masses or organomegaly.  No abdomen pulsations or bruits.  Normal bowel sounds. No pulsations and no masses felt, No guarding or rebound.   EXTREMITIES: No cyanosis, clubbing or edema noted at this time., no calf tenderness bilaterally.   PERIPHERAL VASCULAR SYSTEM: Good palpable distal pulses.   CENTRAL NERVOUS SYSTEM: No focal motor or sensory deficits noted.   SKIN: Skin without lesions, moist, well perfused.   MUSCLE STRENGTH & TONE: No noteable weakness, atrophy or abnormal movement.     I HAVE REVIEWED :    The vital signs, nurses notes, and all the pertinent radiology and labs.    04/18/20 24  EKG shows sinus rhythm poor R-wave progression low voltage noted.  No acute ST changes    Current Outpatient Medications   Medication Instructions    aspirin 81 mg, Oral, Daily    butalbital-acetaminophen-caffeine -40 mg (FIORICET, ESGIC) -40 mg per tablet 1 tablet, Oral, Every 6 hours PRN    prenatal 25/iron fum/folic/dha (PRENATAL-1 ORAL) Oral, Daily    progesterone (PROMETRIUM) 200 mg, Oral, Daily          Assessment & Plan     Vasovagal syncope  She appears clinically stable I have encouraged her to increase the fluid intake wear support stockings knee-high lots of salt and avoid any caffeinated beverages and lots of fluids especially water.  Avoid any stimulants.  Currently she is on IVF program at this  time with recent implant so these conservative measures would also help.    PFO (patent foramen ovale)  PFO documented on agitated saline contrast study without any actual visualization of defect she appears hemodynamically stable.  She has been on aspirin 81 mg a day continue the same.  She did take Plavix for a short course of time and this has been discontinued over the course of time.        Anxiety and depression  Continue on present therapy clinically stable.  Monitor and management per primary care    Tensed product of in vitro fertilization (IVF) pregnancy  He would IVF implantation performed and she is due for blood work on the  await the results.          Follow up in about 6 months (around 10/18/2024).

## 2024-04-18 NOTE — ASSESSMENT & PLAN NOTE
PFO documented on agitated saline contrast study without any actual visualization of defect she appears hemodynamically stable.  She has been on aspirin 81 mg a day continue the same.  She did take Plavix for a short course of time and this has been discontinued over the course of time.

## 2024-05-21 LAB
OHS QRS DURATION: 76 MS
OHS QTC CALCULATION: 430 MS

## 2024-06-04 LAB
ABO AND RH: NORMAL
ANTIBODY SCREEN: NEGATIVE
C TRACH RRNA SPEC QL PROBE: NEGATIVE
HBV SURFACE AG SERPL QL IA: NEGATIVE
HCV AB SERPL QL IA: NONREACTIVE
HIV 1+2 AB+HIV1 P24 AG SERPL QL IA: NONREACTIVE
N GONORRHOEAE, AMPLIFIED DNA: NEGATIVE
RPR: NONREACTIVE

## 2024-08-16 ENCOUNTER — TELEPHONE (OUTPATIENT)
Dept: FAMILY MEDICINE | Facility: CLINIC | Age: 34
End: 2024-08-16

## 2024-08-16 ENCOUNTER — OFFICE VISIT (OUTPATIENT)
Dept: FAMILY MEDICINE | Facility: CLINIC | Age: 34
End: 2024-08-16
Payer: COMMERCIAL

## 2024-08-16 VITALS
HEART RATE: 92 BPM | OXYGEN SATURATION: 99 % | BODY MASS INDEX: 25.36 KG/M2 | DIASTOLIC BLOOD PRESSURE: 68 MMHG | WEIGHT: 137.81 LBS | HEIGHT: 62 IN | SYSTOLIC BLOOD PRESSURE: 118 MMHG

## 2024-08-16 DIAGNOSIS — Z86.16 HISTORY OF 2019 NOVEL CORONAVIRUS DISEASE (COVID-19): ICD-10-CM

## 2024-08-16 DIAGNOSIS — Z3A.20 20 WEEKS GESTATION OF PREGNANCY: ICD-10-CM

## 2024-08-16 DIAGNOSIS — J01.90 ACUTE NON-RECURRENT SINUSITIS, UNSPECIFIED LOCATION: ICD-10-CM

## 2024-08-16 DIAGNOSIS — J20.9 ACUTE BRONCHITIS, UNSPECIFIED ORGANISM: Primary | ICD-10-CM

## 2024-08-16 RX ORDER — ALBUTEROL SULFATE 90 UG/1
2 INHALANT RESPIRATORY (INHALATION) EVERY 6 HOURS PRN
Qty: 8 G | Refills: 0 | Status: SHIPPED | OUTPATIENT
Start: 2024-08-16

## 2024-08-16 RX ORDER — AZITHROMYCIN 250 MG/1
TABLET, FILM COATED ORAL
Qty: 6 TABLET | Refills: 0 | Status: SHIPPED | OUTPATIENT
Start: 2024-08-16 | End: 2024-08-21

## 2024-08-16 NOTE — TELEPHONE ENCOUNTER
Spoke with pt states insurance company uses [employer] for insurance wavers  states a waver needs to be filled out christa her appt today to be paid for.     [employer] - [phone] opt 4 provide case number

## 2024-08-16 NOTE — TELEPHONE ENCOUNTER
----- Message from Baylee Cancino sent at 8/16/2024  8:19 AM CDT -----  The patient said she will see anyone today. She is coughing up green and brown. She had Covid last Thursday. She is  20 weeks pregnant. Pt's # [phone]

## 2024-08-16 NOTE — TELEPHONE ENCOUNTER
Spoke with pt who states she had Covid last Thursday. Coughing and has brown mucus with blood in it. Talked to GYN about it a couple days ago who states that it should run its course.   Spoke with Dr. Cm who would offered pt an appt. Pt accepted. Appt scheduled for same day.

## 2024-08-16 NOTE — PROGRESS NOTES
SUBJECTIVE:    Patient ID: Telma Greenberg is a 33 y.o. female.    Chief Complaint: Cough (No bottles//Pt is here for a sick visit- head congested, tired, sinus drainage, cough produces yellow and brown mucus with blood in it. Covid positive last week tested negative today-has not taken much medication due to pregnancy//KE)    Tested positive for COVID last Thursday.  Today she is pregnant 20w2d.     Cough, runny nose and headache.  Brain fog is terrible. Talked to a nurse on insurance and was told to come in. Feeling a little SOB when walking or talking too much. A little hard to take a deep breath. Coughing up nothing and it wont go down. Getting bloody mucus when she blows her nose.  Has been throwing up and gagging.  Has been able to eat and has been having an appetite. Has energy some days, though others it drops. No fever.     Has had history of COVID x 3 times but had never had it this bad. Has had bronchitis in the past but it was a long time ago.     Cough  This is a new problem. The current episode started 1 to 4 weeks ago. The problem has been gradually worsening. The problem occurs constantly. The cough is Non-productive, productive of sputum, productive of brown sputum, productive of blood-tinged sputum and productive of bloody sputum. Associated symptoms include chills, ear congestion, headaches, myalgias, nasal congestion, postnasal drip, rhinorrhea and sweats. Pertinent negatives include no chest pain, ear pain, fever, heartburn, hemoptysis, rash, sore throat, shortness of breath, weight loss or wheezing. Nothing aggravates the symptoms. She has tried nothing for the symptoms. The treatment provided no relief.       Office Visit on 04/18/2024   Component Date Value Ref Range Status    QRS Duration 04/18/2024 76  ms Final    [facility] QTC Calculation 04/18/2024 430  ms Final       Past Medical History:   Diagnosis Date    Abnormal Pap smear of cervix 06/2014    colpo done no bx bc was pregnant    Depression      1st pregnancy    HPV in female     Hyperlipidemia     Hypotension     Migraines      Social History     Socioeconomic History    Marital status: Single    Number of children: 3   Occupational History    Occupation:     Tobacco Use    Smoking status: Never    Smokeless tobacco: Never   Substance and Sexual Activity    Alcohol use: No     Comment: former , minimal    Drug use: No    Sexual activity: Yes     Partners: Male     History reviewed. No pertinent surgical history.  Family History   Problem Relation Name Age of Onset    Breast cancer Maternal Grandmother      Rectal cancer Maternal Grandmother      Lung cancer Father          smoker    Pneumonia Mother          smoker    Stroke Brother      No Known Problems Brother      Alcohol abuse Brother      Alcohol abuse Brother      Alzheimer's disease Paternal Grandmother      Colon cancer Neg Hx      Ovarian cancer Neg Hx         Review of patient's allergies indicates:  No Known Allergies    Current Outpatient Medications:     albuterol (VENTOLIN HFA) 90 mcg/actuation inhaler, Inhale 2 puffs into the lungs every 6 (six) hours as needed for Wheezing or Shortness of Breath (cough). Rescue, Disp: 8 g, Rfl: 0    aspirin 81 MG Chew, Take 81 mg by mouth once daily., Disp: , Rfl:     azithromycin (Z-PATTI) 250 MG tablet, Take 2 tablets by mouth on day 1; Take 1 tablet by mouth on days 2-5, Disp: 6 tablet, Rfl: 0    butalbital-acetaminophen-caffeine -40 mg (FIORICET, ESGIC) -40 mg per tablet, Take 1 tablet by mouth every 6 (six) hours as needed for Pain or Headaches., Disp: 30 tablet, Rfl: 1    prenatal 25/iron fum/folic/dha (PRENATAL-1 ORAL), Take by mouth Daily., Disp: , Rfl:     progesterone (PROMETRIUM) 100 MG capsule, Take 200 mg by mouth once daily., Disp: , Rfl:     Review of Systems   Constitutional:  Positive for chills. Negative for fever and weight loss.   HENT:  Positive for postnasal drip and rhinorrhea. Negative for ear pain and sore  "throat.    Respiratory:  Positive for cough. Negative for hemoptysis, shortness of breath and wheezing.    Cardiovascular:  Negative for chest pain.   Gastrointestinal:  Negative for heartburn.   Musculoskeletal:  Positive for myalgias.   Skin:  Negative for rash.   Neurological:  Positive for headaches.          Objective:      Vitals:    08/16/24 1034   BP: 118/68   Pulse: 92   SpO2: 99%   Weight: 62.5 kg (137 lb 12.8 oz)   Height: 5' 2" (1.575 m)     Physical Exam  Vitals reviewed.   Constitutional:       General: She is not in acute distress.     Appearance: Normal appearance. She is well-developed.   HENT:      Head: Normocephalic and atraumatic.      Right Ear: Tympanic membrane and ear canal normal.      Left Ear: Tympanic membrane and ear canal normal.      Nose:      Right Sinus: No maxillary sinus tenderness or frontal sinus tenderness.      Left Sinus: No maxillary sinus tenderness or frontal sinus tenderness.      Mouth/Throat:      Mouth: Mucous membranes are moist.      Pharynx: Uvula midline.      Tonsils: No tonsillar exudate.   Neck:      Thyroid: No thyromegaly.   Cardiovascular:      Rate and Rhythm: Normal rate and regular rhythm.      Heart sounds: No murmur heard.     No friction rub.   Pulmonary:      Effort: Pulmonary effort is normal.      Breath sounds: Normal breath sounds. No wheezing or rales.   Abdominal:      General: Bowel sounds are normal. There is no distension.      Palpations: Abdomen is soft.      Tenderness: There is no abdominal tenderness.   Musculoskeletal:      Cervical back: Neck supple.   Lymphadenopathy:      Cervical: No cervical adenopathy.   Skin:     General: Skin is warm and dry.      Findings: No rash.   Neurological:      Mental Status: She is alert and oriented to person, place, and time.   Psychiatric:         Speech: Speech normal.         Thought Content: Thought content normal.         Judgment: Judgment normal.           Assessment:       1. Acute " bronchitis, unspecified organism    2. Acute non-recurrent sinusitis, unspecified location    3. History of 2019 novel coronavirus disease (COVID-19)    4. 20 weeks gestation of pregnancy         Plan:       Acute bronchitis, unspecified organism  Comments:  Acute. Will tx with albuterol prn.  Orders:  -     albuterol (VENTOLIN HFA) 90 mcg/actuation inhaler; Inhale 2 puffs into the lungs every 6 (six) hours as needed for Wheezing or Shortness of Breath (cough). Rescue  Dispense: 8 g; Refill: 0    Acute non-recurrent sinusitis, unspecified location  Comments:  Acute. Will place on anbx. Can also use nasal saline.  Orders:  -     azithromycin (Z-PATTI) 250 MG tablet; Take 2 tablets by mouth on day 1; Take 1 tablet by mouth on days 2-5  Dispense: 6 tablet; Refill: 0    History of 2019 novel coronavirus disease (COVID-19)  Comments:  Given ER precautions for worsening SOB/CP or symptoms    20 weeks gestation of pregnancy      Follow up if symptoms worsen or fail to improve, for As scheduled.        8/16/2024 Anayeli Cm

## 2024-08-16 NOTE — TELEPHONE ENCOUNTER
----- Message from Alaina Kinney LPN sent at 8/16/2024 11:43 AM CDT -----    ----- Message -----  From: Alia Mckeon  Sent: 8/16/2024  11:32 AM CDT  To: Alaina Kinney LPN      ----- Message -----  From: Mamta Saul MA  Sent: 8/16/2024  11:27 AM CDT  To: Alia Mckeon      ----- Message -----  From: Rakel So MA  Sent: 8/16/2024  11:15 AM CDT  To: Caitlyn [facility]ward Staff    Patient is here and would like to know if the Doctor has received her insurance waiver to pay for today's visit.   The case number is [id number]  Patient Contact Number is [phone]

## 2024-10-17 LAB
GLUCOSE SERPL-MCNC: 117 MG/DL
INDIRECT COOMBS: NEGATIVE
RPR: NONREACTIVE

## 2024-11-12 ENCOUNTER — HOSPITAL ENCOUNTER (OUTPATIENT)
Facility: HOSPITAL | Age: 34
Discharge: HOME | End: 2024-11-12
Attending: OBSTETRICS & GYNECOLOGY | Admitting: OBSTETRICS & GYNECOLOGY
Payer: COMMERCIAL

## 2024-11-12 VITALS — SYSTOLIC BLOOD PRESSURE: 105 MMHG | DIASTOLIC BLOOD PRESSURE: 59 MMHG | HEART RATE: 93 BPM | RESPIRATION RATE: 18 BRPM

## 2024-11-12 DIAGNOSIS — Z31.83 IN VITRO FERTILIZATION: ICD-10-CM

## 2024-11-12 DIAGNOSIS — Z31.83 IN VITRO FERTILIZATION: Primary | ICD-10-CM

## 2024-11-12 PROCEDURE — 59025 FETAL NON-STRESS TEST: CPT

## 2024-11-13 NOTE — DISCHARGE INSTRUCTIONS
Keep your scheduled appointment with your provider.    Call your Doctor if you have any of the following:  Temperature above 100 degrees  Nausea, vomiting and/or diarrhea  Severe headache, dizziness, or blurred vision  Notable increase in swelling of hands or feet  Notable swelling of face and lips  Difficulty, pain or burning with urination  Foul smelling vaginal discharge  Decreased fetal movement    Come to the hospital if you have any of the following symptoms:  Your water breaks  More than 4-6 contractions in 1 hour for 2 or more hours  Vaginal bleeding like a period    After 28 weeks, you should feel 10 distinct fetal movements within a 2 hour period.    It is recommended that you drink 1/2 a gallon of water each day.  Tea, Soda and Juice are  in addition to this.    Labor and Delivery Phone number: [phone]    If you need to be seen on Labor and delivery between the hours of 6pm and 7am, please enter through the Emergency room entrance.

## 2024-11-15 ENCOUNTER — HOSPITAL ENCOUNTER (OUTPATIENT)
Facility: HOSPITAL | Age: 34
Discharge: HOME | End: 2024-11-15
Attending: OBSTETRICS & GYNECOLOGY | Admitting: OBSTETRICS & GYNECOLOGY
Payer: COMMERCIAL

## 2024-11-15 VITALS — SYSTOLIC BLOOD PRESSURE: 126 MMHG | HEART RATE: 90 BPM | DIASTOLIC BLOOD PRESSURE: 66 MMHG

## 2024-11-15 DIAGNOSIS — Z31.83 IN VITRO FERTILIZATION: ICD-10-CM

## 2024-11-15 PROCEDURE — 59025 FETAL NON-STRESS TEST: CPT

## 2024-11-15 NOTE — DISCHARGE INSTRUCTIONS
Keep your scheduled appointment with your provider.    Call your Doctor if you have any of the following:  Temperature above 100 degrees  Nausea, vomiting and/or diarrhea  Severe headache, dizziness, or blurred vision  Notable increase in swelling of hands or feet  Notable swelling of face and lips  Difficulty, pain or burning with urination  Foul smelling vaginal discharge  Decreased fetal movement    Come to the hospital if you have any of the following symptoms:  Your water breaks  More than 4-6 contractions in 1 hour for 2 or more hours  Vaginal bleeding like a period    After 28 weeks, you should feel 10 distinct fetal movements within a 2 hour period.    It is recommended that you drink 1/2 a gallon of water each day.  Tea, Soda and Juice are  in addition to this.    Labor and Delivery Phone number: [phone]    If you need to be seen on Labor and delivery between the hours of 6pm and 7am, please enter through the Emergency room entrance.

## 2024-11-19 ENCOUNTER — HOSPITAL ENCOUNTER (OUTPATIENT)
Facility: HOSPITAL | Age: 34
Discharge: HOME | End: 2024-11-19
Attending: OBSTETRICS & GYNECOLOGY | Admitting: OBSTETRICS & GYNECOLOGY
Payer: COMMERCIAL

## 2024-11-19 VITALS — DIASTOLIC BLOOD PRESSURE: 62 MMHG | HEART RATE: 96 BPM | RESPIRATION RATE: 17 BRPM | SYSTOLIC BLOOD PRESSURE: 104 MMHG

## 2024-11-19 DIAGNOSIS — Z31.83 IN VITRO FERTILIZATION: ICD-10-CM

## 2024-11-19 PROCEDURE — 59025 FETAL NON-STRESS TEST: CPT

## 2024-11-19 NOTE — DISCHARGE INSTRUCTIONS
Keep your scheduled appointment with your provider.    Call your Doctor if you have any of the following:  Temperature above 100 degrees  Nausea, vomiting and/or diarrhea  Severe headache, dizziness, or blurred vision  Notable increase in swelling of hands or feet  Notable swelling of face and lips  Difficulty, pain or burning with urination  Foul smelling vaginal discharge  Decreased fetal movement    Come to the hospital if you have any of the following symptoms:  Your water breaks  More than 4-6 contractions in 1 hour for 2 or more hours  Vaginal bleeding like a period    After 28 weeks, you should feel 10 distinct fetal movements within a 2 hour period.    It is recommended that you drink 1/2 a gallon of water each day.  Tea, Soda and Juice are  in addition to this.    Labor and Delivery Phone number: [phone]    If you need to be seen on Labor and delivery between the hours of 6pm and 7am, please enter through the Emergency room entrance.

## 2024-11-22 ENCOUNTER — HOSPITAL ENCOUNTER (OUTPATIENT)
Facility: HOSPITAL | Age: 34
Discharge: HOME | End: 2024-11-22
Attending: OBSTETRICS & GYNECOLOGY | Admitting: OBSTETRICS & GYNECOLOGY
Payer: COMMERCIAL

## 2024-11-22 VITALS — DIASTOLIC BLOOD PRESSURE: 66 MMHG | RESPIRATION RATE: 16 BRPM | SYSTOLIC BLOOD PRESSURE: 109 MMHG | HEART RATE: 98 BPM

## 2024-11-22 DIAGNOSIS — Z31.83 IN VITRO FERTILIZATION: ICD-10-CM

## 2024-11-22 PROCEDURE — 59025 FETAL NON-STRESS TEST: CPT

## 2024-11-22 NOTE — DISCHARGE INSTRUCTIONS
Keep your scheduled appointment with your provider.    Call your Doctor if you have any of the following:  Temperature above 100 degrees  Nausea, vomiting and/or diarrhea  Severe headache, dizziness, or blurred vision  Notable increase in swelling of hands or feet  Notable swelling of face and lips  Difficulty, pain or burning with urination  Foul smelling vaginal discharge  Decreased fetal movement    Come to the hospital if you have any of the following symptoms:  Your water breaks  More than 4-6 contractions in 1 hour for 2 or more hours  Vaginal bleeding like a period    After 28 weeks, you should feel 10 distinct fetal movements within a 2 hour period.    It is recommended that you drink 1/2 a gallon of water each day.  Tea, Soda and Juice are  in addition to this.    Labor and Delivery Phone number: [phone]    If you need to be seen on Labor and delivery between the hours of 6pm and 7am, please enter through the Emergency room entrance.

## 2024-11-26 ENCOUNTER — HOSPITAL ENCOUNTER (OUTPATIENT)
Facility: HOSPITAL | Age: 34
Discharge: HOME | End: 2024-11-26
Attending: OBSTETRICS & GYNECOLOGY | Admitting: OBSTETRICS & GYNECOLOGY
Payer: COMMERCIAL

## 2024-11-26 VITALS — DIASTOLIC BLOOD PRESSURE: 70 MMHG | HEART RATE: 93 BPM | SYSTOLIC BLOOD PRESSURE: 110 MMHG | RESPIRATION RATE: 16 BRPM

## 2024-11-26 DIAGNOSIS — Z31.83 IN VITRO FERTILIZATION: ICD-10-CM

## 2024-11-26 PROCEDURE — 59025 FETAL NON-STRESS TEST: CPT

## 2024-11-26 NOTE — DISCHARGE INSTRUCTIONS
Keep your scheduled appointment with your provider.    Call your Doctor if you have any of the following:  Temperature above 100 degrees  Nausea, vomiting and/or diarrhea  Severe headache, dizziness, or blurred vision  Notable increase in swelling of hands or feet  Notable swelling of face and lips  Difficulty, pain or burning with urination  Foul smelling vaginal discharge  Decreased fetal movement    Come to the hospital if you have any of the following symptoms:  Your water breaks  More than 4-6 contractions in 1 hour for 2 or more hours  Vaginal bleeding like a period    After 28 weeks, you should feel 10 distinct fetal movements within a 2 hour period.    It is recommended that you drink 1/2 a gallon of water each day.  Tea, Soda and Juice are  in addition to this.    Labor and Delivery Phone number: [phone]    If you need to be seen on Labor and delivery between the hours of 6pm and 7am, please enter through the Emergency room entrance.

## 2024-11-29 ENCOUNTER — HOSPITAL ENCOUNTER (OUTPATIENT)
Facility: HOSPITAL | Age: 34
Discharge: HOME | End: 2024-11-29
Attending: OBSTETRICS & GYNECOLOGY | Admitting: OBSTETRICS & GYNECOLOGY
Payer: COMMERCIAL

## 2024-11-29 VITALS — HEART RATE: 87 BPM | SYSTOLIC BLOOD PRESSURE: 120 MMHG | DIASTOLIC BLOOD PRESSURE: 71 MMHG

## 2024-11-29 DIAGNOSIS — Z31.83 IN VITRO FERTILIZATION: ICD-10-CM

## 2024-11-29 PROCEDURE — 59025 FETAL NON-STRESS TEST: CPT

## 2024-11-29 NOTE — DISCHARGE INSTRUCTIONS
Keep your scheduled appointment with your provider.    Call your Doctor if you have any of the following:  Temperature above 100 degrees  Nausea, vomiting and/or diarrhea  Severe headache, dizziness, or blurred vision  Notable increase in swelling of hands or feet  Notable swelling of face and lips  Difficulty, pain or burning with urination  Foul smelling vaginal discharge  Decreased fetal movement    Come to the hospital if you have any of the following symptoms:  Your water breaks  More than 4-6 contractions in 1 hour for 2 or more hours  Vaginal bleeding like a period    After 28 weeks, you should feel 10 distinct fetal movements within a 2 hour period.    It is recommended that you drink 1/2 a gallon of water each day.  Tea, Soda and Juice are  in addition to this.    Labor and Delivery Phone number: [phone]    If you need to be seen on Labor and delivery between the hours of 6pm and 7am, please enter through the Emergency room entrance.

## 2024-12-02 LAB — PRENATAL STREP B CULTURE: NORMAL

## 2024-12-04 ENCOUNTER — HOSPITAL ENCOUNTER (OUTPATIENT)
Facility: HOSPITAL | Age: 34
Discharge: HOME | End: 2024-12-04
Attending: OBSTETRICS & GYNECOLOGY | Admitting: OBSTETRICS & GYNECOLOGY
Payer: COMMERCIAL

## 2024-12-04 VITALS — HEART RATE: 92 BPM | SYSTOLIC BLOOD PRESSURE: 120 MMHG | DIASTOLIC BLOOD PRESSURE: 76 MMHG | RESPIRATION RATE: 16 BRPM

## 2024-12-04 DIAGNOSIS — Z31.83 IN VITRO FERTILIZATION: ICD-10-CM

## 2024-12-04 PROCEDURE — 59025 FETAL NON-STRESS TEST: CPT

## 2024-12-04 NOTE — DISCHARGE INSTRUCTIONS
Keep your scheduled appointment with your provider.    Call your Doctor if you have any of the following:  Temperature above 100 degrees  Nausea, vomiting and/or diarrhea  Severe headache, dizziness, or blurred vision  Notable increase in swelling of hands or feet  Notable swelling of face and lips  Difficulty, pain or burning with urination  Foul smelling vaginal discharge  Decreased fetal movement    Come to the hospital if you have any of the following symptoms:  Your water breaks  More than 4-6 contractions in 1 hour for 2 or more hours  Vaginal bleeding like a period    After 28 weeks, you should feel 10 distinct fetal movements within a 2 hour period.    It is recommended that you drink 1/2 a gallon of water each day.  Tea, Soda and Juice are  in addition to this.    Labor and Delivery Phone number: [phone]    If you need to be seen on Labor and delivery between the hours of 6pm and 7am, please enter through the Emergency room entrance.    c

## 2024-12-06 ENCOUNTER — HOSPITAL ENCOUNTER (OUTPATIENT)
Facility: HOSPITAL | Age: 34
Discharge: HOME | End: 2024-12-06
Attending: OBSTETRICS & GYNECOLOGY | Admitting: OBSTETRICS & GYNECOLOGY
Payer: COMMERCIAL

## 2024-12-06 VITALS — DIASTOLIC BLOOD PRESSURE: 71 MMHG | SYSTOLIC BLOOD PRESSURE: 118 MMHG | HEART RATE: 82 BPM

## 2024-12-06 DIAGNOSIS — Z31.83 IN VITRO FERTILIZATION: ICD-10-CM

## 2024-12-06 PROCEDURE — 59025 FETAL NON-STRESS TEST: CPT

## 2024-12-06 NOTE — DISCHARGE INSTRUCTIONS
Keep your scheduled appointment with your provider.    Call your Doctor if you have any of the following:  Temperature above 100 degrees  Nausea, vomiting and/or diarrhea  Severe headache, dizziness, or blurred vision  Notable increase in swelling of hands or feet  Notable swelling of face and lips  Difficulty, pain or burning with urination  Foul smelling vaginal discharge  Decreased fetal movement    Come to the hospital if you have any of the following symptoms:  Your water breaks  More than 4-6 contractions in 1 hour for 2 or more hours  Vaginal bleeding like a period    After 28 weeks, you should feel 10 distinct fetal movements within a 2 hour period.    It is recommended that you drink 1/2 a gallon of water each day.  Tea, Soda and Juice are  in addition to this.    Labor and Delivery Phone number: [phone]    If you need to be seen on Labor and delivery between the hours of 6pm and 7am, please enter through the Emergency room entrance.

## 2024-12-10 ENCOUNTER — HOSPITAL ENCOUNTER (OUTPATIENT)
Facility: HOSPITAL | Age: 34
Discharge: HOME | End: 2024-12-10
Attending: OBSTETRICS & GYNECOLOGY | Admitting: OBSTETRICS & GYNECOLOGY
Payer: COMMERCIAL

## 2024-12-10 VITALS — RESPIRATION RATE: 18 BRPM | SYSTOLIC BLOOD PRESSURE: 128 MMHG | DIASTOLIC BLOOD PRESSURE: 73 MMHG | HEART RATE: 90 BPM

## 2024-12-10 DIAGNOSIS — Z31.83 IN VITRO FERTILIZATION: ICD-10-CM

## 2024-12-10 PROCEDURE — 59025 FETAL NON-STRESS TEST: CPT

## 2024-12-10 NOTE — DISCHARGE INSTRUCTIONS
Keep your scheduled appointment with your provider.    Call your Doctor if you have any of the following:  Temperature above 100 degrees  Nausea, vomiting and/or diarrhea  Severe headache, dizziness, or blurred vision  Notable increase in swelling of hands or feet  Notable swelling of face and lips  Difficulty, pain or burning with urination  Foul smelling vaginal discharge  Decreased fetal movement    Come to the hospital if you have any of the following symptoms:  Your water breaks  More than 4-6 contractions in 1 hour for 2 or more hours  Vaginal bleeding like a period    After 28 weeks, you should feel 10 distinct fetal movements within a 2 hour period.    It is recommended that you drink 1/2 a gallon of water each day.  Tea, Soda and Juice are  in addition to this.    Labor and Delivery Phone number: [phone]    If you need to be seen on Labor and delivery between the hours of 6pm and 7am, please enter through the Emergency room entrance.

## 2024-12-12 ENCOUNTER — OFFICE VISIT (OUTPATIENT)
Dept: CARDIOLOGY | Facility: CLINIC | Age: 34
End: 2024-12-12
Payer: COMMERCIAL

## 2024-12-12 VITALS
OXYGEN SATURATION: 98 % | WEIGHT: 152 LBS | HEIGHT: 62 IN | SYSTOLIC BLOOD PRESSURE: 124 MMHG | HEART RATE: 98 BPM | BODY MASS INDEX: 27.97 KG/M2 | RESPIRATION RATE: 18 BRPM | DIASTOLIC BLOOD PRESSURE: 78 MMHG

## 2024-12-12 DIAGNOSIS — R07.89 OTHER CHEST PAIN: Primary | ICD-10-CM

## 2024-12-12 DIAGNOSIS — R55 VASOVAGAL SYNCOPE: ICD-10-CM

## 2024-12-12 DIAGNOSIS — J45.20 MILD INTERMITTENT REACTIVE AIRWAY DISEASE WITHOUT COMPLICATION: ICD-10-CM

## 2024-12-12 PROBLEM — J45.909 REACTIVE AIRWAY DISEASE: Status: ACTIVE | Noted: 2024-12-12

## 2024-12-12 PROCEDURE — 1160F RVW MEDS BY RX/DR IN RCRD: CPT | Mod: CPTII,S$GLB,, | Performed by: INTERNAL MEDICINE

## 2024-12-12 PROCEDURE — 3078F DIAST BP <80 MM HG: CPT | Mod: CPTII,S$GLB,, | Performed by: INTERNAL MEDICINE

## 2024-12-12 PROCEDURE — 99214 OFFICE O/P EST MOD 30 MIN: CPT | Mod: S$GLB,,, | Performed by: INTERNAL MEDICINE

## 2024-12-12 PROCEDURE — 1159F MED LIST DOCD IN RCRD: CPT | Mod: CPTII,S$GLB,, | Performed by: INTERNAL MEDICINE

## 2024-12-12 PROCEDURE — 3074F SYST BP LT 130 MM HG: CPT | Mod: CPTII,S$GLB,, | Performed by: INTERNAL MEDICINE

## 2024-12-12 PROCEDURE — 3008F BODY MASS INDEX DOCD: CPT | Mod: CPTII,S$GLB,, | Performed by: INTERNAL MEDICINE

## 2024-12-12 PROCEDURE — 99999 PR PBB SHADOW E&M-EST. PATIENT-LVL III: CPT | Mod: PBBFAC,,, | Performed by: INTERNAL MEDICINE

## 2024-12-12 NOTE — ASSESSMENT & PLAN NOTE
She had is she has intermittent mild reactive airway abnormality noted recommend to use Ventolin on p.r.n. basis

## 2024-12-12 NOTE — ASSESSMENT & PLAN NOTE
Atypical chest pains are noted recommend to add magnesium oxide daily to her regimen this could be increased to b.i.d. regimen if tolerated.  Also consider adding small doses of Mylanta or Maalox to her regimen

## 2024-12-12 NOTE — PROGRESS NOTES
Subjective:    Patient ID:  Telma Greenberg is a 34 y.o. female patient here for evaluation Follow-up (Pt states that when she has contractions she has tightness in her chest)      History of Present Illness:     Year old with a G3 P for gestation at 37 weeks is seeking follow-up evaluation she has been having some discomfort in his chest with contractions with uterine contractions.  Baby's growth is normal and she had successful IVF done and so periodic follow-up has been done at this time.  Clinically no occurrence of any dizziness lightheadedness noted no weakness mild swelling in the noted in the lower extremities thought to be physiologic at this time.    Chest pains are short duration and spontaneously resolved.  No arm neck or jaw pain noted this has symptoms are described as tightness rather than a pain.  And resolved spontaneously  She has not been using her bronchodilator      Review of patient's allergies indicates:  No Known Allergies    Past Medical History:   Diagnosis Date    Abnormal Pap smear of cervix 06/2014    colpo done no bx bc was pregnant    Depression     1st pregnancy    HPV in female     Hyperlipidemia     Hypotension     Migraines      History reviewed. No pertinent surgical history.  Social History     Tobacco Use    Smoking status: Never    Smokeless tobacco: Never   Substance Use Topics    Alcohol use: No     Comment: former , minimal    Drug use: No        Review of Systems:    As noted in HPI in addition      REVIEW OF SYSTEMS  CARDIOVASCULAR:  Positive for chest tightness as described above   Denies palpitations, arm, neck, or jaw pain  RESPIRATORY: No recent fever, cough chills, SOB or congestion  : No blood in the urine  GI: No Nausea, vomiting, mild constipation, diarrhea, blood, or reflux.  MUSCULOSKELETAL: No myalgias  NEURO: No lightheadedness or dizziness  EYES: No Double vision, blurry, vision or headache              Objective        Vitals:    12/12/24 1322   BP: 124/78  "  Pulse: 98   Resp: 18       LIPIDS - LAST 2   Lab Results   Component Value Date    CHOL 263 (H) 11/09/2021    HDL 40 (L) 11/09/2021    LDLCALC 196 (H) 11/09/2021    TRIG 128 11/09/2021    CHOLHDL 6.6 (H) 11/09/2021       CBC - LAST 2  Lab Results   Component Value Date    WBC 8.20 02/17/2022    WBC 8.0 11/09/2021    RBC 4.89 02/17/2022    RBC 4.69 11/09/2021    HGB 13.7 02/17/2022    HGB 13.4 11/09/2021    HCT 42.3 02/17/2022    HCT 40.8 11/09/2021    MCV 87 02/17/2022    MCV 87.0 11/09/2021    MCH 28.0 02/17/2022    MCH 28.6 11/09/2021    MCHC 32.4 02/17/2022    MCHC 32.8 11/09/2021    RDW 13.3 02/17/2022    RDW 12.9 11/09/2021     02/17/2022     11/09/2021    MPV 10.5 02/17/2022    MPV 11.3 11/09/2021    GRAN 5.3 02/17/2022    GRAN 64.8 02/17/2022    LYMPH 2.3 02/17/2022    LYMPH 27.8 02/17/2022    MONO 0.5 02/17/2022    MONO 5.5 02/17/2022    BASO 0.02 02/17/2022    BASO 24 11/09/2021    NRBC 0 02/17/2022       CHEMISTRY & LIVER FUNCTION - LAST 2  Lab Results   Component Value Date     02/17/2022     11/09/2021    K 4.4 02/17/2022    K 4.0 11/09/2021     02/17/2022     11/09/2021    CO2 24 02/17/2022    CO2 26 11/09/2021    ANIONGAP 10 02/17/2022    ANIONGAP 11 10/25/2016    BUN 13 02/17/2022    BUN 13 11/09/2021    CREATININE 0.8 02/17/2022    CREATININE 0.81 11/09/2021    GLU 96 02/17/2022    GLU 76 11/09/2021    CALCIUM 8.9 02/17/2022    CALCIUM 9.2 11/09/2021    ALBUMIN 4.4 02/17/2022    ALBUMIN 4.7 11/09/2021    PROT 7.5 02/17/2022    PROT 7.4 11/09/2021    ALKPHOS 38 (L) 02/17/2022    ALKPHOS 73 10/25/2016    ALT 21 02/17/2022    ALT 12 11/09/2021    AST 24 02/17/2022    AST 15 11/09/2021    BILITOT 0.7 02/17/2022    BILITOT 0.8 11/09/2021        CARDIAC PROFILE - LAST 2  No results found for: "BNP", "CPK", "CPKMB", "LDH", "TROPONINI", "TROPONINIHS"     COAGULATION - LAST 2  Lab Results   Component Value Date    LABPT 12.6 02/17/2022    INR 1.0 02/17/2022    APTT 31.8 " 02/17/2022       ENDOCRINE & PSA - LAST 2  Lab Results   Component Value Date    TSH 1.92 11/09/2021    TSH 1.528 05/03/2016        ECHOCARDIOGRAM RESULTS  Results for orders placed during the hospital encounter of 02/17/22    Transesophageal echo (STACY)    Interpretation Summary  · The left ventricle is normal in size with normal systolic function.  · Normal left ventricular diastolic function.  · The estimated ejection fraction is 60%.  · Normal right ventricular size with normal right ventricular systolic function.  · There is a patent foramen ovale present with no shunt. Small amount of bubbles crossing the interatrial septum with bubble study consistent with a small PFO.  · No interatrial septal defect present. The interatrial septum was not aneurysmal or floppy  · Normal appearing left atrial appendage.      CURRENT/PREVIOUS VISIT EKG  Results for orders placed or performed in visit on 04/18/24   IN OFFICE EKG 12-LEAD (to [facility])    Collection Time: 04/18/24  2:41 PM   Result Value Ref Range    QRS Duration 76 ms    OHS QTC Calculation 430 ms    Narrative    Test Reason : R55,    Vent. Rate : 077 BPM     Atrial Rate : 077 BPM     P-R Int : 140 ms          QRS Dur : 076 ms      QT Int : 380 ms       P-R-T Axes : 023 056 042 degrees     QTc Int : 430 ms    Normal sinus rhythm  Cannot rule out Anterior infarct ,age undetermined  Abnormal ECG  No previous ECGs available  Confirmed by Branden Hickey MD ([id number]) on 5/21/2024 9:07:55 PM    Referred By: VANESSA   SELF           Confirmed By:Branden Hickey MD     No valid procedures specified.   No results found for this or any previous visit.    No valid procedures specified.    PHYSICAL EXAM  CONSTITUTIONAL: Well built, well nourished in no apparent distress  NECK: no carotid bruit, no JVD  LUNGS: CTA  CHEST WALL: no tenderness  HEART: regular rate and rhythm, S1, S2 normal, no murmur, click, rub or gallop   ABDOMEN: soft, non-tender; bowel sounds normal; no masses,   no organomegaly  EXTREMITIES: Extremities normal, no edema, no calf tenderness noted  NEURO: AAO X 3    I HAVE REVIEWED :    The vital signs, nurses notes, and all the pertinent radiology and labs.    12/12/2024 EKG shows normal sinus rhythm within normal limits    Current Outpatient Medications   Medication Instructions    albuterol (VENTOLIN HFA) 90 mcg/actuation inhaler 2 puffs, Inhalation, Every 6 hours PRN, Rescue    aspirin 81 mg, Daily    butalbital-acetaminophen-caffeine -40 mg (FIORICET, ESGIC) -40 mg per tablet 1 tablet, Oral, Every 6 hours PRN    prenatal 25/iron fum/folic/dha (PRENATAL-1 ORAL) Daily    progesterone (PROMETRIUM) 200 mg, Daily          Assessment & Plan     1. Other chest pain  Assessment & Plan:  Atypical chest pains are noted recommend to add magnesium oxide daily to her regimen this could be increased to b.i.d. regimen if tolerated.  Also consider adding small doses of Mylanta or Maalox to her regimen      2. Vasovagal syncope  Assessment & Plan:  No further episodes of syncope noted.  Clinically stable and encouraged hydration      3. Mild intermittent reactive airway disease without complication  Assessment & Plan:  She had is she has intermittent mild reactive airway abnormality noted recommend to use Ventolin on p.r.n. basis            No follow-ups on file.

## 2024-12-13 ENCOUNTER — HOSPITAL ENCOUNTER (OUTPATIENT)
Facility: HOSPITAL | Age: 34
Discharge: HOME | End: 2024-12-13
Attending: OBSTETRICS & GYNECOLOGY | Admitting: OBSTETRICS & GYNECOLOGY
Payer: COMMERCIAL

## 2024-12-13 VITALS — SYSTOLIC BLOOD PRESSURE: 129 MMHG | DIASTOLIC BLOOD PRESSURE: 70 MMHG | HEART RATE: 86 BPM | RESPIRATION RATE: 16 BRPM

## 2024-12-13 DIAGNOSIS — Z31.83 IN VITRO FERTILIZATION: ICD-10-CM

## 2024-12-13 PROCEDURE — 59025 FETAL NON-STRESS TEST: CPT

## 2024-12-13 NOTE — DISCHARGE INSTRUCTIONS
Keep your scheduled appointment with your provider.    Call your Doctor if you have any of the following:  Temperature above 100 degrees  Nausea, vomiting and/or diarrhea  Severe headache, dizziness, or blurred vision  Notable increase in swelling of hands or feet  Notable swelling of face and lips  Difficulty, pain or burning with urination  Foul smelling vaginal discharge  Decreased fetal movement    Come to the hospital if you have any of the following symptoms:  Your water breaks  More than 4-6 contractions in 1 hour for 2 or more hours  Vaginal bleeding like a period    After 28 weeks, you should feel 10 distinct fetal movements within a 2 hour period.    It is recommended that you drink 1/2 a gallon of water each day.  Tea, Soda and Juice are  in addition to this.    Labor and Delivery Phone number: [phone]    If you need to be seen on Labor and delivery between the hours of 6pm and 7am, please enter through the Emergency room entrance.

## 2024-12-17 ENCOUNTER — HOSPITAL ENCOUNTER (OUTPATIENT)
Facility: HOSPITAL | Age: 34
Discharge: HOME | End: 2024-12-17
Attending: OBSTETRICS & GYNECOLOGY | Admitting: OBSTETRICS & GYNECOLOGY
Payer: COMMERCIAL

## 2024-12-17 VITALS — HEART RATE: 88 BPM | SYSTOLIC BLOOD PRESSURE: 115 MMHG | DIASTOLIC BLOOD PRESSURE: 68 MMHG | RESPIRATION RATE: 16 BRPM

## 2024-12-17 DIAGNOSIS — Z31.83 IN VITRO FERTILIZATION: ICD-10-CM

## 2024-12-17 PROCEDURE — 59025 FETAL NON-STRESS TEST: CPT

## 2024-12-17 NOTE — DISCHARGE INSTRUCTIONS
Keep your scheduled appointment with your provider.    Call your Doctor if you have any of the following:  Temperature above 100 degrees  Nausea, vomiting and/or diarrhea  Severe headache, dizziness, or blurred vision  Notable increase in swelling of hands or feet  Notable swelling of face and lips  Difficulty, pain or burning with urination  Foul smelling vaginal discharge  Decreased fetal movement    Come to the hospital if you have any of the following symptoms:  Your water breaks  More than 4-6 contractions in 1 hour for 2 or more hours  Vaginal bleeding like a period    After 28 weeks, you should feel 10 distinct fetal movements within a 2 hour period.    It is recommended that you drink 1/2 a gallon of water each day.  Tea, Soda and Juice are  in addition to this.    Labor and Delivery Phone number: [phone]    If you need to be seen on Labor and delivery between the hours of 6pm and 7am, please enter through the Emergency room entrance.

## 2024-12-20 ENCOUNTER — HOSPITAL ENCOUNTER (OUTPATIENT)
Facility: HOSPITAL | Age: 34
Discharge: HOME | End: 2024-12-20
Attending: OBSTETRICS & GYNECOLOGY | Admitting: OBSTETRICS & GYNECOLOGY
Payer: COMMERCIAL

## 2024-12-20 VITALS — DIASTOLIC BLOOD PRESSURE: 73 MMHG | RESPIRATION RATE: 16 BRPM | HEART RATE: 93 BPM | SYSTOLIC BLOOD PRESSURE: 116 MMHG

## 2024-12-20 DIAGNOSIS — Z31.83 IN VITRO FERTILIZATION: ICD-10-CM

## 2024-12-20 PROCEDURE — 59025 FETAL NON-STRESS TEST: CPT

## 2024-12-20 NOTE — DISCHARGE INSTRUCTIONS
Keep your scheduled appointment with your provider.    Call your Doctor if you have any of the following:  Temperature above 100 degrees  Nausea, vomiting and/or diarrhea  Severe headache, dizziness, or blurred vision  Notable increase in swelling of hands or feet  Notable swelling of face and lips  Difficulty, pain or burning with urination  Foul smelling vaginal discharge  Decreased fetal movement    Come to the hospital if you have any of the following symptoms:  Your water breaks  More than 4-6 contractions in 1 hour for 2 or more hours  Vaginal bleeding like a period    After 28 weeks, you should feel 10 distinct fetal movements within a 2 hour period.    It is recommended that you drink 1/2 a gallon of water each day.  Tea, Soda and Juice are  in addition to this.    Labor and Delivery Phone number: [phone]    If you need to be seen on Labor and delivery between the hours of 6pm and 7am, please enter through the Emergency room entrance.

## 2024-12-23 DIAGNOSIS — Z34.90 ENCOUNTER FOR INDUCTION OF LABOR: ICD-10-CM

## 2024-12-23 DIAGNOSIS — Z31.83 IN VITRO FERTILIZATION: Primary | ICD-10-CM

## 2024-12-26 ENCOUNTER — ANESTHESIA (OUTPATIENT)
Dept: SURGERY | Facility: HOSPITAL | Age: 34
End: 2024-12-26
Payer: COMMERCIAL

## 2024-12-26 ENCOUNTER — ANESTHESIA EVENT (OUTPATIENT)
Dept: SURGERY | Facility: HOSPITAL | Age: 34
End: 2024-12-26
Payer: COMMERCIAL

## 2024-12-26 ENCOUNTER — ANESTHESIA EVENT (OUTPATIENT)
Dept: OBSTETRICS AND GYNECOLOGY | Facility: HOSPITAL | Age: 34
End: 2024-12-26

## 2024-12-26 ENCOUNTER — ANESTHESIA (OUTPATIENT)
Dept: OBSTETRICS AND GYNECOLOGY | Facility: HOSPITAL | Age: 34
End: 2024-12-26

## 2024-12-26 ENCOUNTER — HOSPITAL ENCOUNTER (INPATIENT)
Facility: HOSPITAL | Age: 34
LOS: 2 days | Discharge: HOME | End: 2024-12-28
Attending: OBSTETRICS & GYNECOLOGY | Admitting: OBSTETRICS & GYNECOLOGY
Payer: COMMERCIAL

## 2024-12-26 DIAGNOSIS — Z37.9 NORMAL LABOR: ICD-10-CM

## 2024-12-26 DIAGNOSIS — O42.90: ICD-10-CM

## 2024-12-26 DIAGNOSIS — O42.90 DELAYED DELIVERY AFTER SROM (SPONTANEOUS RUPTURE OF MEMBRANES): Primary | ICD-10-CM

## 2024-12-26 LAB
ABO + RH BLD: NORMAL
ALBUMIN SERPL BCP-MCNC: 2.7 G/DL (ref 3.5–5.2)
ALP SERPL-CCNC: 149 U/L (ref 55–135)
ALT SERPL W/O P-5'-P-CCNC: 8 U/L (ref 10–44)
AMPHET+METHAMPHET UR QL: NEGATIVE
ANION GAP SERPL CALC-SCNC: 7 MMOL/L (ref 8–16)
APTT PPP: 28.7 SEC (ref 21–32)
AST SERPL-CCNC: 24 U/L (ref 10–40)
BARBITURATES UR QL SCN>200 NG/ML: NEGATIVE
BASOPHILS # BLD AUTO: 0.02 K/UL (ref 0–0.2)
BASOPHILS NFR BLD: 0.1 % (ref 0–1.9)
BENZODIAZ UR QL SCN>200 NG/ML: NEGATIVE
BILIRUB SERPL-MCNC: 0.7 MG/DL (ref 0.1–1)
BLD GP AB SCN CELLS X3 SERPL QL: NORMAL
BUN SERPL-MCNC: 9 MG/DL (ref 6–20)
BUPRENORPHINE UR QL: NEGATIVE
BZE UR QL SCN: NEGATIVE
CALCIUM SERPL-MCNC: 7.2 MG/DL (ref 8.7–10.5)
CANNABINOIDS UR QL SCN: NEGATIVE
CHLORIDE SERPL-SCNC: 109 MMOL/L (ref 95–110)
CO2 SERPL-SCNC: 18 MMOL/L (ref 23–29)
CREAT SERPL-MCNC: 0.6 MG/DL (ref 0.5–1.4)
CREAT UR-MCNC: 55.2 MG/DL (ref 15–325)
DIFFERENTIAL METHOD BLD: ABNORMAL
EOSINOPHIL # BLD AUTO: 0 K/UL (ref 0–0.5)
EOSINOPHIL NFR BLD: 0.2 % (ref 0–8)
ERYTHROCYTE [DISTWIDTH] IN BLOOD BY AUTOMATED COUNT: 17.9 % (ref 11.5–14.5)
ERYTHROCYTE [DISTWIDTH] IN BLOOD BY AUTOMATED COUNT: 18.4 % (ref 11.5–14.5)
EST. GFR  (NO RACE VARIABLE): >60 ML/MIN/1.73 M^2
FENTANYL UR QL SCN: NORMAL
GLUCOSE SERPL-MCNC: 88 MG/DL (ref 70–110)
HCT VFR BLD AUTO: 33.7 % (ref 37–48.5)
HCT VFR BLD AUTO: 34.7 % (ref 37–48.5)
HGB BLD-MCNC: 10.3 G/DL (ref 12–16)
HGB BLD-MCNC: 10.9 G/DL (ref 12–16)
IMM GRANULOCYTES # BLD AUTO: 0.08 K/UL (ref 0–0.04)
IMM GRANULOCYTES NFR BLD AUTO: 0.6 % (ref 0–0.5)
INR PPP: 0.9 (ref 0.8–1.2)
LYMPHOCYTES # BLD AUTO: 2.4 K/UL (ref 1–4.8)
LYMPHOCYTES NFR BLD: 17.1 % (ref 18–48)
MCH RBC QN AUTO: 25 PG (ref 27–31)
MCH RBC QN AUTO: 25.7 PG (ref 27–31)
MCHC RBC AUTO-ENTMCNC: 30.6 G/DL (ref 32–36)
MCHC RBC AUTO-ENTMCNC: 31.4 G/DL (ref 32–36)
MCV RBC AUTO: 80 FL (ref 82–98)
MCV RBC AUTO: 84 FL (ref 82–98)
MONOCYTES # BLD AUTO: 0.8 K/UL (ref 0.3–1)
MONOCYTES NFR BLD: 5.5 % (ref 4–15)
NEUTROPHILS # BLD AUTO: 10.6 K/UL (ref 1.8–7.7)
NEUTROPHILS NFR BLD: 76.5 % (ref 38–73)
NRBC BLD-RTO: 0 /100 WBC
OPIATES UR QL SCN: NEGATIVE
PCP UR QL SCN>25 NG/ML: NEGATIVE
PLATELET # BLD AUTO: 144 K/UL (ref 150–450)
PLATELET # BLD AUTO: 146 K/UL (ref 150–450)
PMV BLD AUTO: 11.8 FL (ref 9.2–12.9)
PMV BLD AUTO: 12.3 FL (ref 9.2–12.9)
POTASSIUM SERPL-SCNC: 4.4 MMOL/L (ref 3.5–5.1)
PROT SERPL-MCNC: 4.9 G/DL (ref 6–8.4)
PROTHROMBIN TIME: 10.3 SEC (ref 9–12.5)
RBC # BLD AUTO: 4.01 M/UL (ref 4–5.4)
RBC # BLD AUTO: 4.36 M/UL (ref 4–5.4)
SODIUM SERPL-SCNC: 134 MMOL/L (ref 136–145)
TOXICOLOGY INFORMATION: NORMAL
WBC # BLD AUTO: 13.83 K/UL (ref 3.9–12.7)
WBC # BLD AUTO: 27.71 K/UL (ref 3.9–12.7)

## 2024-12-26 PROCEDURE — 25000003 PHARM REV CODE 250: Performed by: OBSTETRICS & GYNECOLOGY

## 2024-12-26 PROCEDURE — 80307 DRUG TEST PRSMV CHEM ANLYZR: CPT | Performed by: OBSTETRICS & GYNECOLOGY

## 2024-12-26 PROCEDURE — 85610 PROTHROMBIN TIME: CPT | Performed by: OBSTETRICS & GYNECOLOGY

## 2024-12-26 PROCEDURE — 86593 SYPHILIS TEST NON-TREP QUANT: CPT | Performed by: OBSTETRICS & GYNECOLOGY

## 2024-12-26 PROCEDURE — 85730 THROMBOPLASTIN TIME PARTIAL: CPT | Performed by: OBSTETRICS & GYNECOLOGY

## 2024-12-26 PROCEDURE — 30233N1 TRANSFUSION OF NONAUTOLOGOUS RED BLOOD CELLS INTO PERIPHERAL VEIN, PERCUTANEOUS APPROACH: ICD-10-PCS | Performed by: OBSTETRICS & GYNECOLOGY

## 2024-12-26 PROCEDURE — 63600175 PHARM REV CODE 636 W HCPCS

## 2024-12-26 PROCEDURE — 80053 COMPREHEN METABOLIC PANEL: CPT | Performed by: OBSTETRICS & GYNECOLOGY

## 2024-12-26 PROCEDURE — 86901 BLOOD TYPING SEROLOGIC RH(D): CPT | Performed by: OBSTETRICS & GYNECOLOGY

## 2024-12-26 PROCEDURE — 72200006 HC VAGINAL DELIVERY LEVEL III

## 2024-12-26 PROCEDURE — P9016 RBC LEUKOCYTES REDUCED: HCPCS | Performed by: OBSTETRICS & GYNECOLOGY

## 2024-12-26 PROCEDURE — 25000003 PHARM REV CODE 250

## 2024-12-26 PROCEDURE — 12000002 HC ACUTE/MED SURGE SEMI-PRIVATE ROOM

## 2024-12-26 PROCEDURE — 10D17ZZ EXTRACTION OF PRODUCTS OF CONCEPTION, RETAINED, VIA NATURAL OR ARTIFICIAL OPENING: ICD-10-PCS | Performed by: OBSTETRICS & GYNECOLOGY

## 2024-12-26 PROCEDURE — 86920 COMPATIBILITY TEST SPIN: CPT | Performed by: OBSTETRICS & GYNECOLOGY

## 2024-12-26 PROCEDURE — 85025 COMPLETE CBC W/AUTO DIFF WBC: CPT | Performed by: OBSTETRICS & GYNECOLOGY

## 2024-12-26 PROCEDURE — 0W3R7ZZ CONTROL BLEEDING IN GENITOURINARY TRACT, VIA NATURAL OR ARTIFICIAL OPENING: ICD-10-PCS | Performed by: OBSTETRICS & GYNECOLOGY

## 2024-12-26 PROCEDURE — 63600175 PHARM REV CODE 636 W HCPCS: Performed by: OBSTETRICS & GYNECOLOGY

## 2024-12-26 PROCEDURE — 80307 DRUG TEST PRSMV CHEM ANLYZR: CPT | Mod: 91 | Performed by: OBSTETRICS & GYNECOLOGY

## 2024-12-26 PROCEDURE — 85027 COMPLETE CBC AUTOMATED: CPT | Performed by: OBSTETRICS & GYNECOLOGY

## 2024-12-26 RX ORDER — OXYTOCIN-SODIUM CHLORIDE 0.9% IV SOLN 30 UNIT/500ML 30-0.9/5 UT/ML-%
95 SOLUTION INTRAVENOUS ONCE AS NEEDED
Status: COMPLETED | OUTPATIENT
Start: 2024-12-26 | End: 2024-12-26

## 2024-12-26 RX ORDER — MISOPROSTOL 200 UG/1
800 TABLET ORAL ONCE AS NEEDED
Status: DISCONTINUED | OUTPATIENT
Start: 2024-12-26 | End: 2024-12-26

## 2024-12-26 RX ORDER — METHYLERGONOVINE MALEATE 0.2 MG/ML
200 INJECTION INTRAVENOUS ONCE AS NEEDED
Status: COMPLETED | OUTPATIENT
Start: 2024-12-26 | End: 2024-12-26

## 2024-12-26 RX ORDER — FENTANYL/BUPIVACAINE/NS/PF 2MCG/ML-.1
10 PLASTIC BAG, INJECTION (ML) INJECTION CONTINUOUS
Status: DISCONTINUED | OUTPATIENT
Start: 2024-12-26 | End: 2024-12-26

## 2024-12-26 RX ORDER — EPHEDRINE SULFATE 50 MG/ML
10 INJECTION, SOLUTION INTRAVENOUS ONCE
Status: DISCONTINUED | OUTPATIENT
Start: 2024-12-26 | End: 2024-12-26

## 2024-12-26 RX ORDER — SODIUM CHLORIDE, SODIUM LACTATE, POTASSIUM CHLORIDE, CALCIUM CHLORIDE 600; 310; 30; 20 MG/100ML; MG/100ML; MG/100ML; MG/100ML
INJECTION, SOLUTION INTRAVENOUS CONTINUOUS PRN
Status: DISCONTINUED | OUTPATIENT
Start: 2024-12-26 | End: 2024-12-26

## 2024-12-26 RX ORDER — OXYTOCIN-SODIUM CHLORIDE 0.9% IV SOLN 30 UNIT/500ML 30-0.9/5 UT/ML-%
10 SOLUTION INTRAVENOUS ONCE AS NEEDED
Status: DISCONTINUED | OUTPATIENT
Start: 2024-12-26 | End: 2024-12-28 | Stop reason: HOSPADM

## 2024-12-26 RX ORDER — LABETALOL HYDROCHLORIDE 5 MG/ML
80 INJECTION, SOLUTION INTRAVENOUS ONCE AS NEEDED
Status: DISCONTINUED | OUTPATIENT
Start: 2024-12-26 | End: 2024-12-28 | Stop reason: HOSPADM

## 2024-12-26 RX ORDER — OXYTOCIN 10 [USP'U]/ML
10 INJECTION, SOLUTION INTRAMUSCULAR; INTRAVENOUS ONCE AS NEEDED
Status: DISCONTINUED | OUTPATIENT
Start: 2024-12-26 | End: 2024-12-28 | Stop reason: HOSPADM

## 2024-12-26 RX ORDER — SODIUM CHLORIDE 0.9 % (FLUSH) 0.9 %
10 SYRINGE (ML) INJECTION
Status: DISCONTINUED | OUTPATIENT
Start: 2024-12-26 | End: 2024-12-28 | Stop reason: HOSPADM

## 2024-12-26 RX ORDER — MIDAZOLAM HYDROCHLORIDE 1 MG/ML
INJECTION INTRAMUSCULAR; INTRAVENOUS
Status: DISCONTINUED | OUTPATIENT
Start: 2024-12-26 | End: 2024-12-26

## 2024-12-26 RX ORDER — ROPIVACAINE HYDROCHLORIDE 2 MG/ML
20 INJECTION, SOLUTION EPIDURAL; INFILTRATION ONCE AS NEEDED
Status: DISCONTINUED | OUTPATIENT
Start: 2024-12-26 | End: 2024-12-26

## 2024-12-26 RX ORDER — MISOPROSTOL 200 UG/1
800 TABLET ORAL ONCE AS NEEDED
Status: COMPLETED | OUTPATIENT
Start: 2024-12-26 | End: 2024-12-26

## 2024-12-26 RX ORDER — CEFAZOLIN SODIUM 1 G/3ML
2 INJECTION, POWDER, FOR SOLUTION INTRAMUSCULAR; INTRAVENOUS
Status: COMPLETED | OUTPATIENT
Start: 2024-12-27 | End: 2024-12-27

## 2024-12-26 RX ORDER — DIPHENHYDRAMINE HCL 25 MG
25 CAPSULE ORAL EVERY 4 HOURS PRN
Status: DISCONTINUED | OUTPATIENT
Start: 2024-12-26 | End: 2024-12-28 | Stop reason: HOSPADM

## 2024-12-26 RX ORDER — CALCIUM CARBONATE 200(500)MG
500 TABLET,CHEWABLE ORAL 3 TIMES DAILY PRN
Status: DISCONTINUED | OUTPATIENT
Start: 2024-12-26 | End: 2024-12-26

## 2024-12-26 RX ORDER — TRANEXAMIC ACID 10 MG/ML
1000 INJECTION, SOLUTION INTRAVENOUS EVERY 30 MIN PRN
Status: DISCONTINUED | OUTPATIENT
Start: 2024-12-26 | End: 2024-12-26

## 2024-12-26 RX ORDER — MEPERIDINE HYDROCHLORIDE 25 MG/ML
INJECTION INTRAMUSCULAR; INTRAVENOUS; SUBCUTANEOUS
Status: DISCONTINUED | OUTPATIENT
Start: 2024-12-26 | End: 2024-12-26

## 2024-12-26 RX ORDER — MISOPROSTOL 200 UG/1
800 TABLET ORAL ONCE AS NEEDED
Status: DISCONTINUED | OUTPATIENT
Start: 2024-12-26 | End: 2024-12-28 | Stop reason: HOSPADM

## 2024-12-26 RX ORDER — HYDROCORTISONE 25 MG/G
CREAM TOPICAL 3 TIMES DAILY PRN
Status: DISCONTINUED | OUTPATIENT
Start: 2024-12-26 | End: 2024-12-28 | Stop reason: HOSPADM

## 2024-12-26 RX ORDER — FENTANYL CITRATE 50 UG/ML
INJECTION, SOLUTION INTRAMUSCULAR; INTRAVENOUS
Status: DISCONTINUED | OUTPATIENT
Start: 2024-12-26 | End: 2024-12-26

## 2024-12-26 RX ORDER — MISOPROSTOL 100 UG/1
TABLET ORAL
Status: DISCONTINUED | OUTPATIENT
Start: 2024-12-26 | End: 2024-12-26

## 2024-12-26 RX ORDER — ACETAMINOPHEN 500 MG
1000 TABLET ORAL ONCE
Status: COMPLETED | OUTPATIENT
Start: 2024-12-26 | End: 2024-12-26

## 2024-12-26 RX ORDER — FENTANYL/BUPIVACAINE/NS/PF 2MCG/ML-.1
PLASTIC BAG, INJECTION (ML) INJECTION CONTINUOUS
Status: DISCONTINUED | OUTPATIENT
Start: 2024-12-26 | End: 2024-12-26

## 2024-12-26 RX ORDER — TRANEXAMIC ACID 10 MG/ML
1000 INJECTION, SOLUTION INTRAVENOUS EVERY 30 MIN PRN
Status: DISCONTINUED | OUTPATIENT
Start: 2024-12-26 | End: 2024-12-28 | Stop reason: HOSPADM

## 2024-12-26 RX ORDER — SUCCINYLCHOLINE CHLORIDE 20 MG/ML
INJECTION INTRAMUSCULAR; INTRAVENOUS
Status: DISCONTINUED | OUTPATIENT
Start: 2024-12-26 | End: 2024-12-26

## 2024-12-26 RX ORDER — PROPOFOL 10 MG/ML
VIAL (ML) INTRAVENOUS
Status: DISCONTINUED | OUTPATIENT
Start: 2024-12-26 | End: 2024-12-26

## 2024-12-26 RX ORDER — MEPERIDINE HYDROCHLORIDE 25 MG/ML
25 INJECTION INTRAMUSCULAR; INTRAVENOUS; SUBCUTANEOUS ONCE
Status: DISCONTINUED | OUTPATIENT
Start: 2024-12-26 | End: 2024-12-26

## 2024-12-26 RX ORDER — PRENATAL WITH FERROUS FUM AND FOLIC ACID 3080; 920; 120; 400; 22; 1.84; 3; 20; 10; 1; 12; 200; 27; 25; 2 [IU]/1; [IU]/1; MG/1; [IU]/1; MG/1; MG/1; MG/1; MG/1; MG/1; MG/1; UG/1; MG/1; MG/1; MG/1; MG/1
1 TABLET ORAL DAILY
Status: DISCONTINUED | OUTPATIENT
Start: 2024-12-27 | End: 2024-12-28 | Stop reason: HOSPADM

## 2024-12-26 RX ORDER — LIDOCAINE HYDROCHLORIDE 10 MG/ML
10 INJECTION, SOLUTION INFILTRATION; PERINEURAL ONCE AS NEEDED
Status: DISCONTINUED | OUTPATIENT
Start: 2024-12-26 | End: 2024-12-26

## 2024-12-26 RX ORDER — LABETALOL HYDROCHLORIDE 5 MG/ML
INJECTION, SOLUTION INTRAVENOUS
Status: DISCONTINUED | OUTPATIENT
Start: 2024-12-26 | End: 2024-12-26

## 2024-12-26 RX ORDER — MISOPROSTOL 200 UG/1
400 TABLET ORAL EVERY 6 HOURS
Status: COMPLETED | OUTPATIENT
Start: 2024-12-26 | End: 2024-12-27

## 2024-12-26 RX ORDER — ONDANSETRON 4 MG/1
8 TABLET, ORALLY DISINTEGRATING ORAL EVERY 8 HOURS PRN
Status: DISCONTINUED | OUTPATIENT
Start: 2024-12-26 | End: 2024-12-26

## 2024-12-26 RX ORDER — SIMETHICONE 80 MG
1 TABLET,CHEWABLE ORAL 4 TIMES DAILY PRN
Status: DISCONTINUED | OUTPATIENT
Start: 2024-12-26 | End: 2024-12-26

## 2024-12-26 RX ORDER — OXYTOCIN-SODIUM CHLORIDE 0.9% IV SOLN 30 UNIT/500ML 30-0.9/5 UT/ML-%
95 SOLUTION INTRAVENOUS CONTINUOUS PRN
Status: DISCONTINUED | OUTPATIENT
Start: 2024-12-26 | End: 2024-12-28 | Stop reason: HOSPADM

## 2024-12-26 RX ORDER — ONDANSETRON 4 MG/1
8 TABLET, ORALLY DISINTEGRATING ORAL EVERY 8 HOURS PRN
Status: DISCONTINUED | OUTPATIENT
Start: 2024-12-26 | End: 2024-12-28 | Stop reason: HOSPADM

## 2024-12-26 RX ORDER — LABETALOL HYDROCHLORIDE 5 MG/ML
40 INJECTION, SOLUTION INTRAVENOUS ONCE AS NEEDED
Status: DISCONTINUED | OUTPATIENT
Start: 2024-12-26 | End: 2024-12-28 | Stop reason: HOSPADM

## 2024-12-26 RX ORDER — LABETALOL HYDROCHLORIDE 5 MG/ML
20 INJECTION, SOLUTION INTRAVENOUS ONCE AS NEEDED
Status: DISCONTINUED | OUTPATIENT
Start: 2024-12-26 | End: 2024-12-28 | Stop reason: HOSPADM

## 2024-12-26 RX ORDER — SODIUM CHLORIDE, SODIUM LACTATE, POTASSIUM CHLORIDE, CALCIUM CHLORIDE 600; 310; 30; 20 MG/100ML; MG/100ML; MG/100ML; MG/100ML
INJECTION, SOLUTION INTRAVENOUS CONTINUOUS
Status: DISCONTINUED | OUTPATIENT
Start: 2024-12-26 | End: 2024-12-26

## 2024-12-26 RX ORDER — DEXAMETHASONE SODIUM PHOSPHATE 4 MG/ML
INJECTION, SOLUTION INTRA-ARTICULAR; INTRALESIONAL; INTRAMUSCULAR; INTRAVENOUS; SOFT TISSUE
Status: DISCONTINUED | OUTPATIENT
Start: 2024-12-26 | End: 2024-12-26

## 2024-12-26 RX ORDER — ROCURONIUM BROMIDE 10 MG/ML
INJECTION, SOLUTION INTRAVENOUS
Status: DISCONTINUED | OUTPATIENT
Start: 2024-12-26 | End: 2024-12-26

## 2024-12-26 RX ORDER — OXYTOCIN-SODIUM CHLORIDE 0.9% IV SOLN 30 UNIT/500ML 30-0.9/5 UT/ML-%
30 SOLUTION INTRAVENOUS ONCE AS NEEDED
Status: COMPLETED | OUTPATIENT
Start: 2024-12-26 | End: 2024-12-26

## 2024-12-26 RX ORDER — OXYTOCIN-SODIUM CHLORIDE 0.9% IV SOLN 30 UNIT/500ML 30-0.9/5 UT/ML-%
10 SOLUTION INTRAVENOUS ONCE AS NEEDED
Status: CANCELLED | OUTPATIENT
Start: 2024-12-26 | End: 2036-05-24

## 2024-12-26 RX ORDER — MUPIROCIN 20 MG/G
OINTMENT TOPICAL
Status: CANCELLED | OUTPATIENT
Start: 2024-12-26

## 2024-12-26 RX ORDER — OXYCODONE AND ACETAMINOPHEN 10; 325 MG/1; MG/1
1 TABLET ORAL EVERY 4 HOURS PRN
Status: DISCONTINUED | OUTPATIENT
Start: 2024-12-26 | End: 2024-12-28 | Stop reason: HOSPADM

## 2024-12-26 RX ORDER — CARBOPROST TROMETHAMINE 250 UG/ML
250 INJECTION, SOLUTION INTRAMUSCULAR
Status: DISCONTINUED | OUTPATIENT
Start: 2024-12-26 | End: 2024-12-28 | Stop reason: HOSPADM

## 2024-12-26 RX ORDER — METHYLERGONOVINE MALEATE 0.2 MG/ML
200 INJECTION INTRAVENOUS ONCE AS NEEDED
Status: DISCONTINUED | OUTPATIENT
Start: 2024-12-26 | End: 2024-12-28 | Stop reason: HOSPADM

## 2024-12-26 RX ORDER — SODIUM CHLORIDE 9 MG/ML
INJECTION, SOLUTION INTRAVENOUS
Status: DISCONTINUED | OUTPATIENT
Start: 2024-12-26 | End: 2024-12-26

## 2024-12-26 RX ORDER — OXYTOCIN 10 [USP'U]/ML
10 INJECTION, SOLUTION INTRAMUSCULAR; INTRAVENOUS ONCE AS NEEDED
Status: DISCONTINUED | OUTPATIENT
Start: 2024-12-26 | End: 2024-12-26

## 2024-12-26 RX ORDER — DIPHENOXYLATE HYDROCHLORIDE AND ATROPINE SULFATE 2.5; .025 MG/1; MG/1
2 TABLET ORAL EVERY 6 HOURS PRN
Status: DISCONTINUED | OUTPATIENT
Start: 2024-12-26 | End: 2024-12-26

## 2024-12-26 RX ORDER — HYDROCODONE BITARTRATE AND ACETAMINOPHEN 500; 5 MG/1; MG/1
TABLET ORAL
Status: DISCONTINUED | OUTPATIENT
Start: 2024-12-26 | End: 2024-12-26

## 2024-12-26 RX ORDER — LANOLIN ALCOHOL/MO/W.PET/CERES
400 CREAM (GRAM) TOPICAL DAILY
Status: ON HOLD | COMMUNITY
End: 2024-12-28 | Stop reason: HOSPADM

## 2024-12-26 RX ORDER — DIPHENOXYLATE HYDROCHLORIDE AND ATROPINE SULFATE 2.5; .025 MG/1; MG/1
2 TABLET ORAL EVERY 6 HOURS PRN
Status: DISCONTINUED | OUTPATIENT
Start: 2024-12-26 | End: 2024-12-28 | Stop reason: HOSPADM

## 2024-12-26 RX ORDER — OXYCODONE AND ACETAMINOPHEN 5; 325 MG/1; MG/1
1 TABLET ORAL EVERY 4 HOURS PRN
Status: DISCONTINUED | OUTPATIENT
Start: 2024-12-26 | End: 2024-12-28 | Stop reason: HOSPADM

## 2024-12-26 RX ORDER — LIDOCAINE HYDROCHLORIDE 20 MG/ML
INJECTION INTRAVENOUS
Status: DISCONTINUED | OUTPATIENT
Start: 2024-12-26 | End: 2024-12-26

## 2024-12-26 RX ORDER — DOCUSATE SODIUM 100 MG/1
200 CAPSULE, LIQUID FILLED ORAL 2 TIMES DAILY PRN
Status: DISCONTINUED | OUTPATIENT
Start: 2024-12-26 | End: 2024-12-28 | Stop reason: HOSPADM

## 2024-12-26 RX ORDER — CARBOPROST TROMETHAMINE 250 UG/ML
250 INJECTION, SOLUTION INTRAMUSCULAR
Status: DISCONTINUED | OUTPATIENT
Start: 2024-12-26 | End: 2024-12-26

## 2024-12-26 RX ORDER — CEFAZOLIN SODIUM 1 G/3ML
INJECTION, POWDER, FOR SOLUTION INTRAMUSCULAR; INTRAVENOUS
Status: DISCONTINUED | OUTPATIENT
Start: 2024-12-26 | End: 2024-12-26

## 2024-12-26 RX ORDER — SIMETHICONE 80 MG
1 TABLET,CHEWABLE ORAL EVERY 6 HOURS PRN
Status: DISCONTINUED | OUTPATIENT
Start: 2024-12-26 | End: 2024-12-28 | Stop reason: HOSPADM

## 2024-12-26 RX ORDER — ONDANSETRON HYDROCHLORIDE 2 MG/ML
INJECTION, SOLUTION INTRAVENOUS
Status: DISCONTINUED | OUTPATIENT
Start: 2024-12-26 | End: 2024-12-26

## 2024-12-26 RX ADMIN — SODIUM CHLORIDE, POTASSIUM CHLORIDE, SODIUM LACTATE AND CALCIUM CHLORIDE: 600; 310; 30; 20 INJECTION, SOLUTION INTRAVENOUS at 02:12

## 2024-12-26 RX ADMIN — SODIUM CHLORIDE, SODIUM LACTATE, POTASSIUM CHLORIDE, AND CALCIUM CHLORIDE: .6; .31; .03; .02 INJECTION, SOLUTION INTRAVENOUS at 06:12

## 2024-12-26 RX ADMIN — METHYLERGONOVINE MALEATE 200 MCG: 0.2 INJECTION, SOLUTION INTRAMUSCULAR; INTRAVENOUS at 06:12

## 2024-12-26 RX ADMIN — PROPOFOL 120 MG: 10 INJECTION, EMULSION INTRAVENOUS at 06:12

## 2024-12-26 RX ADMIN — SODIUM CHLORIDE: 0.9 INJECTION, SOLUTION INTRAVENOUS at 06:12

## 2024-12-26 RX ADMIN — ONDANSETRON 8 MG: 2 INJECTION INTRAMUSCULAR; INTRAVENOUS at 06:12

## 2024-12-26 RX ADMIN — MISOPROSTOL 400 MCG: 200 TABLET ORAL at 10:12

## 2024-12-26 RX ADMIN — Medication 2000 ML/HR: at 06:12

## 2024-12-26 RX ADMIN — TRANEXAMIC ACID 1000 MG: 10 INJECTION, SOLUTION INTRAVENOUS at 05:12

## 2024-12-26 RX ADMIN — ACETAMINOPHEN 1000 MG: 500 TABLET, FILM COATED ORAL at 09:12

## 2024-12-26 RX ADMIN — ROCURONIUM BROMIDE 40 MG: 10 INJECTION, SOLUTION INTRAVENOUS at 06:12

## 2024-12-26 RX ADMIN — DEXAMETHASONE SODIUM PHOSPHATE 8 MG: 4 INJECTION, SOLUTION INTRAMUSCULAR; INTRAVENOUS at 06:12

## 2024-12-26 RX ADMIN — CEFAZOLIN 2 G: 330 INJECTION, POWDER, FOR SOLUTION INTRAMUSCULAR; INTRAVENOUS at 06:12

## 2024-12-26 RX ADMIN — DEXTROSE MONOHYDRATE 5 MILLION UNITS: 5 INJECTION INTRAVENOUS at 03:12

## 2024-12-26 RX ADMIN — ROCURONIUM BROMIDE 10 MG: 10 INJECTION, SOLUTION INTRAVENOUS at 06:12

## 2024-12-26 RX ADMIN — OXYTOCIN-SODIUM CHLORIDE 0.9% IV SOLN 30 UNIT/500ML 30 UNITS: 30-0.9/5 SOLUTION at 05:12

## 2024-12-26 RX ADMIN — LIDOCAINE HYDROCHLORIDE 100 MG: 20 INJECTION, SOLUTION INTRAVENOUS at 06:12

## 2024-12-26 RX ADMIN — MISOPROSTOL 400 MCG: 100 TABLET ORAL at 06:12

## 2024-12-26 RX ADMIN — MISOPROSTOL 400 MCG: 200 TABLET ORAL at 05:12

## 2024-12-26 RX ADMIN — Medication 120 MG: at 06:12

## 2024-12-26 RX ADMIN — OXYTOCIN-SODIUM CHLORIDE 0.9% IV SOLN 30 UNIT/500ML 95 MILLI-UNITS/MIN: 30-0.9/5 SOLUTION at 05:12

## 2024-12-26 RX ADMIN — SUGAMMADEX 400 MG: 100 INJECTION, SOLUTION INTRAVENOUS at 06:12

## 2024-12-26 RX ADMIN — SODIUM CHLORIDE: 0.9 INJECTION, SOLUTION INTRAVENOUS at 07:12

## 2024-12-26 RX ADMIN — MIDAZOLAM HYDROCHLORIDE 2 MG: 1 INJECTION, SOLUTION INTRAMUSCULAR; INTRAVENOUS at 06:12

## 2024-12-26 RX ADMIN — FENTANYL CITRATE 100 MCG: 50 INJECTION, SOLUTION INTRAMUSCULAR; INTRAVENOUS at 06:12

## 2024-12-26 RX ADMIN — LABETALOL HYDROCHLORIDE 10 MG: 5 INJECTION, SOLUTION INTRAVENOUS at 06:12

## 2024-12-26 RX ADMIN — MEPERIDINE HYDROCHLORIDE 25 MG: 25 INJECTION INTRAMUSCULAR; INTRAVENOUS; SUBCUTANEOUS at 07:12

## 2024-12-26 NOTE — PLAN OF CARE
Problem: Adult Inpatient Plan of Care  Goal: Plan of Care Review  Outcome: Progressing  Flowsheets (Taken 12/26/2024 9344)  Plan of Care Reviewed With:   patient   spouse  Goal: Optimal Comfort and Wellbeing  Outcome: Progressing     Problem: Labor  Goal: Stable Fetal Wellbeing  Outcome: Progressing  Goal: Acceptable Pain Control  Outcome: Progressing

## 2024-12-26 NOTE — NURSING
OBGYN LABS ENTERED INTO RESULTS CONSOLE      1st Trimester Labs Entered Into Results Console     [x] AOBRH   [x] Rubella Immune   [x] RPR   [x] HBsAg   [x] HIV   [x] Chlamydia   [x] Gonorrhea   [] Cell-Free DNA   [x] Hep-C   [] Varicella    2nd Trimester Labs Entered Into Results Console     [x]OB Glucose Screen      3rd Trimester Labs Entered Into Results Console      [x] GBS   [] RPR    Drug Screen Entered Into Results Console     [] Benzodiazes   [] Methadone   [] Cocaine (Metab)   [] Opiate   [] Amphetamine   [] Marijuana   [] Creatinine   [] Amphetamines-Beaker   [] Barbituates-Beaker   [] Benzodiazepine-Beaker   [] Cannabinoids-Beaker   [] Cocaine-Beaker   [] Fentanyl-Beaker   [] MDMA-Beaker   [] Opiates-Beaker   [] Phencyclidine-Beaker        Results Entered by Khushbu Adame, RN    Results Verified for Manual Entry Accuracy by CHITRA Parisi RN

## 2024-12-26 NOTE — NURSING
[facility]  Department of Obstetrics and Gynecology  PATIENT NAME: Telma Soler  MRN: [id number]  TODAY'S DATE: 2024    CHIEF COMPLAINT: Rupture of Membranes (Pt c/o ROM at 1320. Ctx started around 1230. / )      OB History    Para Term  AB Living   4 3 3 0 0 1   SAB IAB Ectopic Multiple Live Births   0 0 0 0 1      # Outcome Date GA Lbr [place]/2nd Weight Sex Type Anes PTL Lv   4 Current            3 Term 10/11/16 39w3d  3.402 kg (7 lb 8 oz) M Vag-Spont EPI N HEBER      Name: CARLOTTA SOLER      Apgar1: 9  Apgar5: 9   2 Term 01/05/15 40w0d  3.856 kg (8 lb 8 oz) M Vag-Spont      1 Term 09 41w0d  3.799 kg (8 lb 6 oz) F Vag-Spont         Obstetric Comments   Menarche 12     Past Medical History:   Diagnosis Date    Abnormal Pap smear of cervix 2014    colpo done no bx bc was pregnant    Depression     1st pregnancy    HPV in female     Hyperlipidemia     Hypotension     Migraines     PFO (patent foramen ovale)      History reviewed. No pertinent surgical history.  Social History     Tobacco Use    Smoking status: Never    Smokeless tobacco: Never   Substance Use Topics    Alcohol use: No     Comment: former , minimal    Drug use: No       Prenatal Labs  Lab Results   Component Value Date    GROUPTRH O POS 10/11/2016    HGB 13.7 2022    HCT 42.3 2022     2022    RUBELLAIMMUN Reactive 2016    HEPBSAG Negative 2021    CCI73SIKH Negative 2021    RPR Non-reactive 2021    OBGLUCOSESCR 73 2016    STREPBCULT No Group B Streptococcus isolated 09/15/2016       VITAL SIGNS - ABNORMAL VITALS INCLUDE TEMP >100.4,RR <12 or >26, SUSTAINED MATERNAL PULSE <60 or >120     VITAL SIGNS (Most Recent)  Pulse: 101 (24 1425)  Resp: 16 (24 1420)  BP: (!) 156/91 (24 1420)  SpO2: 99 % (24 1425)    OUTPATIENT MEDICATIONS  Current Outpatient Medications   Medication Instructions    albuterol (VENTOLIN HFA) 90 mcg/actuation inhaler 2  puffs, Inhalation, Every 6 hours PRN, Rescue    aspirin 81 mg, Daily    butalbital-acetaminophen-caffeine -40 mg (FIORICET, ESGIC) -40 mg per tablet 1 tablet, Oral, Every 6 hours PRN    magnesium oxide (MAG-OX) 400 mg, Oral, Daily    prenatal 25/iron fum/folic/dha (PRENATAL-1 ORAL) Daily    progesterone (PROMETRIUM) 200 mg, Daily       Chaparrita Parisi, RN  [facility]  12/26/2024

## 2024-12-26 NOTE — NURSING
[facility]  Department of Obstetrics and Gynecology  Labor & Delivery Triage Assessment    PATIENT NAME: Telma Soler  MRN: [id number]  TODAY'S DATE: 2024    CHIEF COMPLAINT: Rupture of Membranes (Pt c/o ROM at 1320. Ctx started around 1230. / )      OB History    Para Term  AB Living   4 3 3 0 0 1   SAB IAB Ectopic Multiple Live Births   0 0 0 0 1      # Outcome Date GA Lbr [place]/2nd Weight Sex Type Anes PTL Lv   4 Current            3 Term 10/11/16 39w3d  3.402 kg (7 lb 8 oz) M Vag-Spont EPI N HEBER      Name: CARLOTTA SOLER      Apgar1: 9  Apgar5: 9   2 Term 01/05/15 40w0d  3.856 kg (8 lb 8 oz) M Vag-Spont      1 Term 09 41w0d  3.799 kg (8 lb 6 oz) F Vag-Spont         Obstetric Comments   Menarche 12     Past Medical History:   Diagnosis Date    Abnormal Pap smear of cervix 2014    colpo done no bx bc was pregnant    Depression     1st pregnancy    HPV in female     Hyperlipidemia     Hypotension     Migraines     PFO (patent foramen ovale)      History reviewed. No pertinent surgical history.      VITAL SIGNS - ABNORMAL VITALS INCLUDE TEMP >100.4,RR <12 or >26, SUSTAINED MATERNAL PULSE <60 or >120     VITAL SIGNS (Most Recent)       VITAL SIGNS     normal  HEADACHE    no     VOMITING    no  VISUAL DISTURBANCES  no  EPIGASTRIC PAIN        no  PROTEINURIA 2+ or MORE             no   EDEMA FACE/EXTREMITIES            no    FETAL MOVEMENT     FETAL MOVEMENT: Active  FETAL HEART RATE BASELINE =  125    FETAL HEART RATE VARIABILITY:  Moderate  FETAL HEART RATE ACCELERATIONS FOR GESTATIONAL AGE: present  FETAL HEART RATE DECELERATIONS: none    ABDOMINAL PAIN/CRAMPING/CONTRACTIONS     Patient is complaining of abdominal pain/cramping/contractions. Contraction pattern is regular, < or = 5 minutes apart. Contraction strength moderate. Resting tone is relaxed. Abdominal palpation is non-tender.    RUPTURE OF MEMBRANES OR LEAKING OF AMNIOTIC FLUID     Patient reports leaking of amniotic  fluid and is clear in color and reporting amount as moderate .   ROM plus collected is Positive. GBS status is Positive.    VAGINAL BLEEDING     Patient denies vaginal bleeding.    VAGINAL EXAM     DILATION:  3  STATION:  -2  EFFACEMENT:  80  PRESENTATION:  cephalic    VAGINAL EXAM DEFERRED DUE TO:   na    PAIN PRESENT ON ARRIVAL     ONSET:   1230  LOCATION:  abdomen  PAIN SCALE (0-10):  4  DESCRIPTION: ctx    Interventions     ROM+ and SVE      PATIENT DISPOSITION     Admitted to Labor & Delivery      Dr. Marquez notified at 1438 of the above assessment.    Chaparrita Parisi, RN  [facility]  12/26/2024

## 2024-12-26 NOTE — ANESTHESIA PREPROCEDURE EVALUATION
12/26/2024  Telma Greenberg is a 34 y.o., female.      Pre-op Assessment    I have reviewed the Patient Summary Reports.     I have reviewed the Nursing Notes. I have reviewed the NPO Status.   I have reviewed the Medications.     Review of Systems  Anesthesia Hx:    Severe hypotension and vasovagal episode after last labor epidural 8 years ago.  No problems with the 1st 2 epidurals.            Denies Family Hx of Anesthesia complications.   Personal Hx of Anesthesia complications (Severe hypotension and vasovagal episode after last epidural)                    Social:  Non-Smoker, No Alcohol Use       Hematology/Oncology:  Hematology Normal   Oncology Normal                                   EENT/Dental:  EENT/Dental Normal           Cardiovascular:  Cardiovascular Normal                   Small PFO, asymptomatic.  BP runs low (80s and 90s).                           Pulmonary:  Pulmonary Normal                       Renal/:  Renal/ Normal                 Hepatic/GI:  Hepatic/GI Normal                    Musculoskeletal:  Musculoskeletal Normal                Neurological:      Headaches                                 Endocrine:  Endocrine Normal            Psych:   anxiety depression                Physical Exam  General: Well nourished, Cooperative, Alert and Oriented    Airway:  Mallampati: III   Mouth Opening: Normal  TM Distance: > 6 cm  Tongue: Normal  Neck ROM: Normal ROM    Dental:  Intact    Chest/Lungs:  Clear to auscultation, Normal Respiratory Rate    Heart:  Rate: Normal  Rhythm: Regular Rhythm  Sounds: Normal        Anesthesia Plan  Type of Anesthesia, risks & benefits discussed:    Anesthesia Type: Epidural  Intra-op Monitoring Plan: Standard ASA Monitors  Informed Consent: Informed consent signed with the Patient and all parties understand the risks and agree with anesthesia plan.  All  questions answered.   ASA Score: 3 Emergent  Anesthesia Plan Notes: After interview and consent, cervical exam showed the patient was almost complete and therefore she decided not to get the epidural.    Ready For Surgery From Anesthesia Perspective.     .

## 2024-12-27 LAB
BASOPHILS # BLD AUTO: 0.01 K/UL (ref 0–0.2)
BASOPHILS NFR BLD: 0 % (ref 0–1.9)
DIFFERENTIAL METHOD BLD: ABNORMAL
EOSINOPHIL # BLD AUTO: 0 K/UL (ref 0–0.5)
EOSINOPHIL NFR BLD: 0 % (ref 0–8)
ERYTHROCYTE [DISTWIDTH] IN BLOOD BY AUTOMATED COUNT: 17.3 % (ref 11.5–14.5)
HCT VFR BLD AUTO: 23.6 % (ref 37–48.5)
HGB BLD-MCNC: 7.7 G/DL (ref 12–16)
IMM GRANULOCYTES # BLD AUTO: 0.16 K/UL (ref 0–0.04)
IMM GRANULOCYTES NFR BLD AUTO: 0.7 % (ref 0–0.5)
LYMPHOCYTES # BLD AUTO: 1 K/UL (ref 1–4.8)
LYMPHOCYTES NFR BLD: 4.3 % (ref 18–48)
MCH RBC QN AUTO: 26.6 PG (ref 27–31)
MCHC RBC AUTO-ENTMCNC: 33 G/DL (ref 32–36)
MCV RBC AUTO: 80 FL (ref 82–98)
MONOCYTES # BLD AUTO: 0.6 K/UL (ref 0.3–1)
MONOCYTES NFR BLD: 2.8 % (ref 4–15)
NEUTROPHILS # BLD AUTO: 21.2 K/UL (ref 1.8–7.7)
NEUTROPHILS NFR BLD: 92.2 % (ref 38–73)
NRBC BLD-RTO: 0 /100 WBC
PLATELET # BLD AUTO: 132 K/UL (ref 150–450)
PMV BLD AUTO: 11.9 FL (ref 9.2–12.9)
RBC # BLD AUTO: 2.9 M/UL (ref 4–5.4)
TREPONEMA PALLIDUM IGG+IGM AB [PRESENCE] IN SERUM OR PLASMA BY IMMUNOASSAY: NONREACTIVE
WBC # BLD AUTO: 23.03 K/UL (ref 3.9–12.7)

## 2024-12-27 PROCEDURE — 63600175 PHARM REV CODE 636 W HCPCS: Performed by: OBSTETRICS & GYNECOLOGY

## 2024-12-27 PROCEDURE — 25000003 PHARM REV CODE 250: Performed by: OBSTETRICS & GYNECOLOGY

## 2024-12-27 PROCEDURE — 85025 COMPLETE CBC W/AUTO DIFF WBC: CPT | Performed by: OBSTETRICS & GYNECOLOGY

## 2024-12-27 PROCEDURE — 12000002 HC ACUTE/MED SURGE SEMI-PRIVATE ROOM

## 2024-12-27 RX ORDER — CALCIUM CARBONATE 200(500)MG
1000 TABLET,CHEWABLE ORAL 3 TIMES DAILY PRN
Status: DISCONTINUED | OUTPATIENT
Start: 2024-12-27 | End: 2024-12-28 | Stop reason: HOSPADM

## 2024-12-27 RX ORDER — ACETAMINOPHEN 325 MG/1
650 TABLET ORAL EVERY 6 HOURS PRN
Status: DISCONTINUED | OUTPATIENT
Start: 2024-12-27 | End: 2024-12-28 | Stop reason: HOSPADM

## 2024-12-27 RX ORDER — OXYTOCIN-SODIUM CHLORIDE 0.9% IV SOLN 30 UNIT/500ML 30-0.9/5 UT/ML-%
95 SOLUTION INTRAVENOUS CONTINUOUS PRN
Status: DISCONTINUED | OUTPATIENT
Start: 2024-12-27 | End: 2024-12-28 | Stop reason: HOSPADM

## 2024-12-27 RX ADMIN — MISOPROSTOL 400 MCG: 200 TABLET ORAL at 12:12

## 2024-12-27 RX ADMIN — CEFAZOLIN 2 G: 330 INJECTION, POWDER, FOR SOLUTION INTRAMUSCULAR; INTRAVENOUS at 09:12

## 2024-12-27 RX ADMIN — IBUPROFEN 600 MG: 200 TABLET, FILM COATED ORAL at 01:12

## 2024-12-27 RX ADMIN — HYDROCORTISONE 2.5%: 25 CREAM TOPICAL at 09:12

## 2024-12-27 RX ADMIN — PRENATAL VIT W/ FE FUMARATE-FA TAB 27-0.8 MG 1 TABLET: 27-0.8 TAB at 09:12

## 2024-12-27 RX ADMIN — DIPHENOXYLATE HYDROCHLORIDE AND ATROPINE SULFATE 2 TABLET: 2.5; .025 TABLET ORAL at 08:12

## 2024-12-27 RX ADMIN — ACETAMINOPHEN 650 MG: 325 TABLET ORAL at 03:12

## 2024-12-27 RX ADMIN — DOCUSATE SODIUM 200 MG: 100 CAPSULE, LIQUID FILLED ORAL at 09:12

## 2024-12-27 RX ADMIN — BENZOCAINE AND LEVOMENTHOL: 200; 5 SPRAY TOPICAL at 01:12

## 2024-12-27 RX ADMIN — CEFAZOLIN 2 G: 330 INJECTION, POWDER, FOR SOLUTION INTRAMUSCULAR; INTRAVENOUS at 02:12

## 2024-12-27 RX ADMIN — CALCIUM CARBONATE (ANTACID) CHEW TAB 500 MG 1000 MG: 500 CHEW TAB at 09:12

## 2024-12-27 RX ADMIN — IBUPROFEN 600 MG: 200 TABLET, FILM COATED ORAL at 07:12

## 2024-12-27 RX ADMIN — CEFAZOLIN 2 G: 330 INJECTION, POWDER, FOR SOLUTION INTRAMUSCULAR; INTRAVENOUS at 06:12

## 2024-12-27 RX ADMIN — ACETAMINOPHEN 650 MG: 325 TABLET ORAL at 09:12

## 2024-12-27 RX ADMIN — MISOPROSTOL 400 MCG: 200 TABLET ORAL at 06:12

## 2024-12-27 NOTE — NURSING TRANSFER
Nursing Transfer Note      12/27/2024   6:26 AM    Nurse giving handoff:Aga Esquivel Rn   Nurse receiving handoff: MELVIN Castelan     Reason patient is being transferred: postpartum    Transfer To: [id number]    Transfer via bed    Transfer with pt belongings    Transported by Reji Shelby RN     Transfer Vital Signs:  Blood Pressure:120/76  Heart Rate:96  O2:97  Temperature:100.5  Respirations:18    Telemetry:   Order for Tele Monitor? No    Additional Lines: none    Medicines sent: dermaplast    Any special needs or follow-up needed: none    Patient belongings transferred with patient: Yes    Chart send with patient: Yes    Notified: spouse with pt upon transfer    Patient reassessed at: 0615 12/27/24 (date, time)  1  Upon arrival to floor: patient oriented to room, call bell in reach, and bed in lowest position

## 2024-12-27 NOTE — PROGRESS NOTES
[facility]  Obstetrics  Postpartum Progress Note    Patient Name: Telma Greenberg  MRN: [id number]  Admission Date: 2024  Hospital Length of Stay: 1 days  Attending Physician: Marisol Marquez MD  Primary Care Provider: Caitlyn Hayward MD    Subjective:     Principal Problem:Normal labor    Hospital Course:  No notes on file    Interval History: s/p  PPD#1    She is doing well this morning. She is tolerating a regular diet without nausea or vomiting. She is voiding spontaneously. She is ambulating. She has passed flatus, and has not a BM. Vaginal bleeding is mild. She denies fever or chills. Abdominal pain is mild and controlled with oral medications. She Is breastfeeding. She desires circumcision for her male baby: yes.    Objective:     Vital Signs (Most Recent):  Temp: 98.2 °F (36.8 °C) (24 1300)  Pulse: 98 (24 1124)  Resp: 17 (24 1124)  BP: 107/66 (24 1124)  SpO2: 96 % (24 1124) Vital Signs (24h Range):  Temp:  [98.1 °F (36.7 °C)-100.9 °F (38.3 °C)] 98.2 °F (36.8 °C)  Pulse:  [] 98  Resp:  [15-19] 17  SpO2:  [95 %-100 %] 96 %  BP: (107-176)/() 107/66     Weight: 73.5 kg (162 lb)  Body mass index is 29.63 kg/m².      Intake/Output Summary (Last 24 hours) at 2024 1536  Last data filed at 2024 1024  Gross per 24 hour   Intake 2274 ml   Output 4888.6 ml   Net -2614.6 ml         Significant Labs:  Lab Results   Component Value Date    GROUPTRH O POS 2024    HEPBSAG Negative 2024    STREPBCULT pos 2024     Recent Labs   Lab 24  0256   HGB 7.7*   HCT 23.6*       I have personallly reviewed all pertinent lab results from the last 24 hours.    Physical Exam  Gen - NAD  Uterus - firm below umbilicus, non tender  Ext - no edema, no calf tenderness     Review of Systems  Assessment/Plan:     34 y.o. female  for:    * Normal labor  S/p  and PP D&C, hemorrhage, blood transfusion  PPD#1    Doing well  Continue pp  care      .    Marisol Marquez MD  Obstetrics  [facility]

## 2024-12-27 NOTE — OP NOTE
Pre-op diagnosis - s/p , postpartum hemorrhage, retained products of conception  Post op diagnosis - same  Procedure - exam under anesthesia, uterine dilation and curettage, placement of Brenda device  Surgeon - Marisol Marquez MD  Anesthesia - general  Complications - none  EBL - 700ml  Findings - normal vulva, vagina, and cervix.  Adherent particles of placenta at uterine fundus.  Uterus was firm at umbilicus    Procedure in detail - The risks, benefits, indications, and alternatives of the procedure were discussed with the patient and informed consent was obtained.  She was taken to the operating room where general anesthesia was obtained without difficulty.  She was prepped and draped in normal sterile fashion in the dorsal lithotomy position with andino catheter in place.  A weighted speculum was placed into the vagina and the anterior lip of the cervix was grasped with a ring forcep.  Manual exam was done and there were small pieces of placenta adherent to the fundal area.  A banjo curette was used to perform a curettage of the fundal area and all quadrants and pieces of placental tissue were removed and sent to path.  Another manual exam was performed and there were no more placental pieces palpable.  The endometrium was smooth.  A Brenda device was placed into the uterine cavity and suction was applied.  Blood filled the tube about 75% of the way to the wall suction cannister.  The patient was given methergine and cytotec per rectum in the OR and the uterus clamped down appropriately.  One unit of PRBC was transfused in the OR. All instruments and sponges were removed from the vagina.  The vagina was packed with Kerlex and after about 15 minutes of observation the kerlex was still dry.  The patient tolerated the procedure well.  Sponge, lap, instrument counts were correct x 2.  She was taken to the OR in stable condition.

## 2024-12-27 NOTE — NURSING
[facility]  Department of OBGYN  OB Summary        PATIENT NAME: Telma Greenberg                                                                                                                                                                       AGE: 34 y.o.                                                                                          MRN: [id number]  PATIENT LOCATION:  [facility]/R[facility]-A  ADMIT DATE: 2024  TODAY'S DATE: 2024 Hospital Day: 1  [place]: Estimated Date of Delivery: 25  GA: 39w1d     OB HISTORY:    OB History    Para Term  AB Living   4 4 4 0 0 4   SAB IAB Ectopic Multiple Live Births   0 0   0 4      # Outcome Date GA Lbr [place]/2nd Weight Sex Type Anes PTL Lv   4 Term 24 39w1d 03:51 / 00:06 3.725 kg (8 lb 3.4 oz) M Vag-Spont None N HBEER   3 Term 10/11/16 39w3d  3.402 kg (7 lb 8 oz) M Vag-Spont EPI N HEBER   2 Term 01/05/15 40w0d  3.856 kg (8 lb 8 oz) M Vag-Spont   HEBER   1 Term 09 41w0d  3.799 kg (8 lb 6 oz) F Vag-Spont   HEBER      Obstetric Comments   Menarche 12       PRE-PROCEDURE DIAGNOSIS: Normal labor    PROCEDURE:   Procedure(s) (LRB):  EXAM UNDER ANESTHESIA, VAGINA (N/A)  DILATION AND CURETTAGE, UTERUS (N/A)       MATERNAL LABS   Lab Results   Component Value Date    GROUPTRH O POS 2024    HGB 10.9 (L) 2024    HCT 34.7 (L) 2024     (L) 2024    RUBELLAIMMUN Reactive 2016    HEPBSAG Negative 2024    NGH19VIQQ nonreactive 2024    RPR nonreactive 10/17/2024    OBGLUCOSESCR 117 10/17/2024    STREPBCULT pos 2024       Fentanyl, Urine   Date Value Ref Range Status   2024 Negative @ASHLEIGH Negative Final     Comment:     The cut-off value is 5.0 ng/mL. This is a screening test. If results   do not   correlate with clinical presentation then a confirmatory send out   test is   advised. This result is intended for use in clinical management. It   is not   intended for use in employment related  testing.         Benzodiazepines   Date Value Ref Range Status   2024 Negative Negative Final     Cocaine (Metab.)   Date Value Ref Range Status   2024 Negative Negative Final     Opiate Scrn, Ur   Date Value Ref Range Status   2024 Negative Negative Final     Barbiturate Screen, Ur   Date Value Ref Range Status   2024 Negative Negative Final     Amphetamine Screen, Ur   Date Value Ref Range Status   2024 Negative Negative Final     THC   Date Value Ref Range Status   2024 Negative Negative Final     Phencyclidine   Date Value Ref Range Status   2024 Negative Negative Final     BUPRENORPHINE   Date Value Ref Range Status   2024 Negative  Final     Comment:     Buprenorphine urine screening threshold: 5 ng/mL.    This report is intended for use in clinical monitoring and management   of   patients only.          SPECIMENS  Specimen (24h ago, onward)       Start     Ordered    24  Specimen to Pathology - Surgery  Once        Comments: Pre-op Diagnosis: Postpartum hemorrhage [O72.1] G4 P 4 now  , at 1717, placenta delivered at 1735, and then patient taken to OR for fragment retained placentaProcedure(s):EXAM UNDER ANESTHESIA, VAGINA Number of specimens: oneName of specimens: placenta     Question:  Release to patient  Answer:  Immediate    24                     Antibiotics (From admission, onward)      Start     Stop Route Frequency Ordered    24 0200  ceFAZolin injection 2 g         24 0159 IV Every 8 hours (non-standard times) 24            INPATIENT MEDICATIONS  Current Facility-Administered Medications   Medication Dose Route Frequency Provider Last Rate Last Admin    carboprost injection 250 mcg  250 mcg Intramuscular Q15 Min PRN Marisol Marquez MD        [START ON 2024] ceFAZolin injection 2 g  2 g Intravenous Q8H Marisol Marquez MD        diphenoxylate-atropine 2.5-0.025 mg per tablet 2 tablet  2 tablet  Oral Q6H PRN Marisol Marquez MD        methylergonovine injection 200 mcg  200 mcg Intramuscular Once PRN Marisol Marquez MD        miSOPROStoL tablet 400 mcg  400 mcg Oral Q6H Marisol Marquez MD        miSOPROStoL tablet 800 mcg  800 mcg Rectal Once PRN Marisol Marquez MD        miSOPROStoL tablet 800 mcg  800 mcg Oral Once PRN Marisol Marquez MD        ondansetron disintegrating tablet 8 mg  8 mg Oral Q8H PRN Marisol Marquez MD        oxytocin 30 units/500 mL (60 milliunits/mL) in 0.9% NaCl IV bolus from bag  10 Units Intravenous Once PRN Marisol Marquez MD        oxytocin injection 10 Units  10 Units Intramuscular Once PRN Marisol Marquez MD        sodium chloride 0.9% flush 10 mL  10 mL Intravenous PRN Marisol Marquez MD        tranexamic acid in NaCl,iso-os IVPB 1,000 mg  1,000 mg Intravenous Q30 Min PRN Marisol Marquez MD           OUTPATIENT MEDICATIONS  Current Outpatient Medications   Medication Instructions    albuterol (VENTOLIN HFA) 90 mcg/actuation inhaler 2 puffs, Inhalation, Every 6 hours PRN, Rescue    aspirin 81 mg, Daily    butalbital-acetaminophen-caffeine -40 mg (FIORICET, ESGIC) -40 mg per tablet 1 tablet, Oral, Every 6 hours PRN    magnesium oxide (MAG-OX) 400 mg, Oral, Daily    prenatal 25/iron fum/folic/dha (PRENATAL-1 ORAL) Daily    progesterone (PROMETRIUM) 200 mg, Daily       VITAL SIGNS (Most Recent)  Temp: 98.1 °F (36.7 °C) (24)  Pulse: (!) 115 (24)  Resp: 15 (24)  BP: (!) 133/90 (24)  SpO2: 99 % (24)  Temp (36hrs), Av.2 °F (36.8 °C), Min:98.1 °F (36.7 °C), Max:98.3 °F (36.8 °C)      Delivery Information for Anjel Greenberg    Birth information:  YOB: 2026   Time of birth: 5:17 PM   Sex: male   Head Delivery Date/Time: 2024  5:17 PM   Delivery type: Vaginal, Spontaneous   Gestational Age: 39w1d       Delivery Providers    Delivering clinician: Marisol Marquez MD   Provider  "Role    Chaparrita Parisi, RN Registered Nurse    Khushbu Adame, RN Registered Nurse    Astrid Cabrera, RN Registered Nurse    Reyes, Darnell,  Scrub Person              Measurements    Weight: 3725 g  Weight (lbs): 8 lb 3.4 oz  Length: 51.4 cm  Length (in): 20.25"         Apgars    Living status: Living  Apgar Component Scores:  1 min.:  5 min.:  10 min.:  15 min.:  20 min.:    Skin color:  0  1       Heart rate:  2  2       Reflex irritability:  2  2       Muscle tone:  2  2       Respiratory effort:  1  2       Total:  7  9       Apgars assigned by: KATRIN CABRERA RN         Operative Delivery    Forceps attempted?: No  Vacuum extractor attempted?: No         Shoulder Dystocia    Shoulder dystocia present?: No           Presentation    Presentation: Vertex  Position: Middle Occiput Anterior           Interventions/Resuscitation    Method: Bulb Suctioning, Tactile Stimulation       Cord    Vessels: 3 vessels  Complications: Nuchal  Nuchal Intervention: clamped and cut  Nuchal Cord Description: tight nuchal cord  Number of Loops: 1  Delayed Cord Clamping?: No  Cord Clamped Date/Time: 2024  5:17 PM  Cord Blood Disposition: Sent with Baby  Gases Sent?: No  Stem Cell Collection (by MD): No       Placenta    Placenta delivery date/time: 2024 1735  Placenta removal: Spontaneous  Placenta appearance: Intact  Placenta disposition: Discarded           Labor Events:       labor: No     Labor Onset Date/Time: 2024 13:20     Dilation Complete Date/Time: 2024 17:11     Start Pushing Date/Time: 2024 17:13     Rupture Date/Time: 24 1320        Rupture type: SRM (Spontaneous Rupture)        Fluid Amount:       Fluid Color: Clear      steroids: None     Antibiotics given for GBS: Yes     Induction: none     Indications for induction:        Augmentation:       Indications for augmentation:       Labor complications: None     Additional complications:          Cervical " ripening:                     Delivery:      Episiotomy: None     Indication for Episiotomy:       Perineal Lacerations: None Repaired:      Periurethral Laceration: bilateral Repaired: Yes   Labial Laceration:   Repaired:     Sulcus Laceration:   Repaired:     Vaginal Laceration:   Repaired:     Cervical Laceration:   Repaired:     Repair suture:       Repair # of packets: 1     Last Value - EBL - Nursing (mL):       Sum - EBL - Nursing (mL): 0     Last Value - EBL - Anesthesia (mL):      Calculated QBL (mL): 50     Running total QBL (mL): 2388.6     Vaginal Sweep Performed: Yes     Surgicount Correct: Yes       Other providers:       Anesthesia    Method: None          Details (if applicable):  Trial of Labor      Categorization:      Priority:     Indications for :     Incision Type:       Additional  information:  Forceps:    Vacuum:    Breech:    Observed anomalies    Other (Comments):           Time ruptured: 3h 57m    SKIN TO SKIN                INFANT FEEDING       PAST MEDICAL HISTORY   Past Medical History:   Diagnosis Date    Abnormal Pap smear of cervix 2014    colpo done no bx bc was pregnant    Depression     1st pregnancy    HPV in female     Hyperlipidemia     Hypotension     Migraines     PFO (patent foramen ovale)         PAST SURGICAL HISTORY    History reviewed. No pertinent surgical history.     SOCIAL HISTORY    Social History     Tobacco Use    Smoking status: Never    Smokeless tobacco: Never   Substance Use Topics    Alcohol use: No     Comment: former , minimal    Drug use: No                     Chaparrita Parisi RN  Date of Service: 2024  7:54 PM

## 2024-12-27 NOTE — L&D DELIVERY NOTE
[facility]  Vaginal Delivery   Operative Note    SUMMARY     Normal spontaneous vaginal delivery of live infant, The patient presented in spontaneous active labor with SROM and progressed to complete in several hours.  The patient began pushing at c/c/+1.  After 5 mins of pushing, infant delivered in OA position.  Tight nuchal cord had to be clamped and cut on the perineum prior to delivery of body.  Shoulders/body followed easily.  Infant placed on patient's abdomen with nursery nurse present.   Lidocaine 1% plain injected sub q (10ml). Lacerations repaired with 2- O vicryl - bilateral periurethral.  After about 20 minutes the placenta was only partially detached and had to be manually extracted.  She had fairly brisk bleeding and uterus was swept manually.  Patient was given cytotec 400 mcg buchal and tranexemic acid IV.  Particles of the placenta were palpable at the fundal area, but patient did not have epidural and could not tolerate manual removal.  I discussed transferring her to the OR for D&C and possible kaye placement and she agreed.  Sponge, lap, needle counts correct.    Patient transferred to the OR    Infant - VMI, apgars 8/9  EBL - about 1600ml in delivery room      Indications: Normal labor  Pregnancy complicated by:   Patient Active Problem List   Diagnosis    Normal labor    Anxiety and depression    Migraines    Other congenital malformations of cardiac septa    Vasovagal syncope    PFO (patent foramen ovale)    [place] product of in vitro fertilization (IVF) pregnancy    Other chest pain    Reactive airway disease     Admitting GA: 39w1d    Delivery Information for Anjel Greenberg    Birth information:  YOB: 2026   Time of birth: 5:17 PM   Sex: male   Head Delivery Date/Time: 2024  5:17 PM   Delivery type: Vaginal, Spontaneous   Gestational Age: 39w1d       Delivery Providers    Delivering clinician: Marisol Marquez MD   Provider Role    Chaparrita Parisi, MELVIN  "Registered Nurse    Khushbu Adame, RN Registered Nurse    Astrid Cabrera, RN Registered Nurse    Reyes, Darnell,  Scrub Person              Measurements    Weight: 3725 g  Weight (lbs): 8 lb 3.4 oz  Length: 51.4 cm  Length (in): 20.25"         Apgars    Living status: Living  Apgar Component Scores:  1 min.:  5 min.:  10 min.:  15 min.:  20 min.:    Skin color:         Heart rate:         Reflex irritability:         Muscle tone:         Respiratory effort:         Total:                  Operative Delivery    Forceps attempted?: No  Vacuum extractor attempted?: No         Shoulder Dystocia    Shoulder dystocia present?: No           Presentation    Presentation: Vertex  Position: Middle Occiput Anterior           Interventions/Resuscitation    Method: Bulb Suctioning, Tactile Stimulation       Cord    Vessels: 3 vessels  Complications: Nuchal  Nuchal Intervention: clamped and cut  Nuchal Cord Description: tight nuchal cord  Number of Loops: 1  Delayed Cord Clamping?: No  Cord Clamped Date/Time: 2024  5:17 PM  Cord Blood Disposition: Sent with Baby  Gases Sent?: No  Stem Cell Collection (by MD): No       Placenta    Placenta delivery date/time: 2024 1735  Placenta removal: Spontaneous  Placenta appearance: Intact  Placenta disposition: Discarded           Labor Events:       labor: No     Labor Onset Date/Time: 2024 13:20     Dilation Complete Date/Time: 2024 17:11     Start Pushing Date/Time: 2024 17:13       Start Pushing Date/Time: 2024 17:13     Rupture Date/Time: 24 1320        Rupture type: SRM (Spontaneous Rupture)        Fluid Amount:       Fluid Color: Clear              steroids: None     Antibiotics given for GBS: Yes     Induction: none     Indications for induction:        Augmentation:       Indications for augmentation:       Labor complications: None     Additional complications:          Cervical ripening:                 "     Delivery:      Episiotomy: None     Indication for Episiotomy:       Perineal Lacerations: None Repaired:      Periurethral Laceration: bilateral Repaired: Yes   Labial Laceration:   Repaired:     Sulcus Laceration:   Repaired:     Vaginal Laceration:   Repaired:     Cervical Laceration:   Repaired:     Repair suture:       Repair # of packets: 1     Last Value - EBL - Nursing (mL):       Sum - EBL - Nursing (mL): 0     Last Value - EBL - Anesthesia (mL):      Calculated QBL (mL): 50     Running total QBL (mL): 2388.6     Vaginal Sweep Performed: Yes     Surgicount Correct: Yes       Other providers:       Anesthesia    Method: None          Details (if applicable):  Trial of Labor      Categorization:      Priority:     Indications for :     Incision Type:       Additional  information:  Forceps:    Vacuum:    Breech:    Observed anomalies    Other (Comments):

## 2024-12-27 NOTE — ANESTHESIA POSTPROCEDURE EVALUATION
Anesthesia Post Evaluation    Patient: Telma Ray    Procedure(s) Performed: Procedure(s) (LRB):  EXAM UNDER ANESTHESIA, VAGINA (N/A)  DILATION AND CURETTAGE, UTERUS (N/A)    Final Anesthesia Type: general      Patient location during evaluation: labor & delivery  Patient participation: Yes- Able to Participate  Level of consciousness: awake and alert  Post-procedure vital signs: reviewed and stable  Pain management: adequate  Airway patency: patent    PONV status at discharge: No PONV  Anesthetic complications: no      Cardiovascular status: stable  Respiratory status: unassisted and spontaneous ventilation  Hydration status: euvolemic  Follow-up not needed.              Vitals Value Taken Time   /72 12/26/24 1929   Temp  12/26/24 1935   Pulse 99 12/26/24 1934   Resp 20 12/26/24 1935   SpO2 98 % 12/26/24 1934   Vitals shown include unfiled device data.      No case tracking events are documented in the log.      Pain/Vel Score: No data recorded

## 2024-12-27 NOTE — PLAN OF CARE
OB Screen completed using health record.  No needs anticipated at this time, and no consult(s) noted.     24 0971   OB SCREEN   Assessment Type Discharge Planning Assessment   Source of Information health record   Received Prenatal Care Yes   Any indications/suspicions for None   Is this a teen pregnancy No   Is the baby in NICU No   Indication for adoption/Safe Haven No   Indication for DME/post-acute needs No   HIV (+) No   Any congenital  disorders No   Fetal demise/ death No

## 2024-12-27 NOTE — TRANSFER OF CARE
"Anesthesia Transfer of Care Note    Patient: Telma Ray    Procedure(s) Performed: Procedure(s) (LRB):  EXAM UNDER ANESTHESIA, VAGINA (N/A)    Patient location: Labor and Delivery    Anesthesia Type: general    Transport from OR: Transported from OR on room air with adequate spontaneous ventilation    Post pain: adequate analgesia    Post assessment: no apparent anesthetic complications and tolerated procedure well    Post vital signs: stable    Level of consciousness: awake    Nausea/Vomiting: no nausea/vomiting    Complications: none    Transfer of care protocol was followed      Last vitals: Visit Vitals  BP (!) 133/90   Pulse (!) 115   Temp 36.7 °C (98.1 °F) (Oral)   Resp 15   Ht 5' 2" (1.575 m)   Wt 73.5 kg (162 lb)   SpO2 99%   Breastfeeding Unknown   BMI 29.63 kg/m²     "

## 2024-12-27 NOTE — ANESTHESIA PREPROCEDURE EVALUATION
12/26/2024  Telma Greenberg is a 34 y.o., female.      Pre-op Assessment    I have reviewed the Patient Summary Reports.     I have reviewed the Nursing Notes. I have reviewed the NPO Status.   I have reviewed the Medications.     Review of Systems  Anesthesia Hx:    Severe hypotension and vasovagal episode after last labor epidural 8 years ago.  No problems with the 1st 2 epidurals.            Denies Family Hx of Anesthesia complications.   Personal Hx of Anesthesia complications (Severe hypotension and vasovagal episode after last epidural)                    Social:  Non-Smoker, No Alcohol Use       Hematology/Oncology:  Hematology Normal   Oncology Normal                                   EENT/Dental:  EENT/Dental Normal           Cardiovascular:  Cardiovascular Normal                   Small PFO, asymptomatic.  BP runs low (80s and 90s).                           Pulmonary:  Pulmonary Normal                       Renal/:  Renal/ Normal                 Hepatic/GI:  Hepatic/GI Normal                    Musculoskeletal:  Musculoskeletal Normal                Neurological:      Headaches                                 Endocrine:  Endocrine Normal            Psych:   anxiety depression                Physical Exam  General: Well nourished, Cooperative, Alert and Oriented    Airway:  Mallampati: III   Mouth Opening: Normal  TM Distance: > 6 cm  Tongue: Normal  Neck ROM: Normal ROM    Dental:  Intact    Chest/Lungs:  Clear to auscultation, Normal Respiratory Rate    Heart:  Rate: Normal  Rhythm: Regular Rhythm  Sounds: Normal        Anesthesia Plan  Type of Anesthesia, risks & benefits discussed:    Anesthesia Type: Gen ETT  Intra-op Monitoring Plan: Standard ASA Monitors  Induction:  rapid sequence and IV  Airway Plan: Video, Post-Induction  Informed Consent: Informed consent signed with the Patient and all  parties understand the risks and agree with anesthesia plan.  All questions answered.   ASA Score: 3 Emergent  Anesthesia Plan Notes: .    Ready For Surgery From Anesthesia Perspective.     .

## 2024-12-27 NOTE — SUBJECTIVE & OBJECTIVE
Interval History: s/p  PPD#1    She is doing well this morning. She is tolerating a regular diet without nausea or vomiting. She is voiding spontaneously. She is ambulating. She has passed flatus, and has not a BM. Vaginal bleeding is mild. She denies fever or chills. Abdominal pain is mild and controlled with oral medications. She Is breastfeeding. She desires circumcision for her male baby: yes.    Objective:     Vital Signs (Most Recent):  Temp: 98.2 °F (36.8 °C) (24 1300)  Pulse: 98 (24 1124)  Resp: 17 (24 1124)  BP: 107/66 (24 1124)  SpO2: 96 % (24 112) Vital Signs (24h Range):  Temp:  [98.1 °F (36.7 °C)-100.9 °F (38.3 °C)] 98.2 °F (36.8 °C)  Pulse:  [] 98  Resp:  [15-19] 17  SpO2:  [95 %-100 %] 96 %  BP: (107-176)/() 107/66     Weight: 73.5 kg (162 lb)  Body mass index is 29.63 kg/m².      Intake/Output Summary (Last 24 hours) at 2024 1536  Last data filed at 2024 1024  Gross per 24 hour   Intake 2274 ml   Output 4888.6 ml   Net -2614.6 ml         Significant Labs:  Lab Results   Component Value Date    GROUPTRH O POS 2024    HEPBSAG Negative 2024    STREPBCULT pos 2024     Recent Labs   Lab 24  0256   HGB 7.7*   HCT 23.6*       I have personallly reviewed all pertinent lab results from the last 24 hours.    Physical Exam  Gen - NAD  Uterus - firm below umbilicus, non tender  Ext - no edema, no calf tenderness     Review of Systems

## 2024-12-27 NOTE — LACTATION NOTE
This note was copied from a baby's chart.     12/27/24 1040   Maternal Assessment   Breast Size Issue none   Breast Shape round   Breast Density soft   Areola elastic   Nipples everted   Maternal Infant Feeding   Maternal Emotional State independent;relaxed   Infant Positioning cradle   Signs of Milk Transfer audible swallow   Pain with Feeding no   Latch Assistance no     Mom breastfeeding now. Good nutritive sucking & swallowing noted. Instructed on the signs of an effective feeding.  Discussed positioning, comfortable latch, rhythmic, nutritive sucking, audible swallows, appropriate length of feeding, comfort of latch and evaluating for fullness cues.  Also discussed appropriate output for age. Assistance offered prn. Mom states understanding and verbalized appropriate recall.

## 2024-12-28 VITALS
BODY MASS INDEX: 29.81 KG/M2 | DIASTOLIC BLOOD PRESSURE: 77 MMHG | TEMPERATURE: 98 F | OXYGEN SATURATION: 97 % | WEIGHT: 162 LBS | RESPIRATION RATE: 18 BRPM | HEART RATE: 92 BPM | SYSTOLIC BLOOD PRESSURE: 129 MMHG | HEIGHT: 62 IN

## 2024-12-28 PROCEDURE — 25000242 PHARM REV CODE 250 ALT 637 W/ HCPCS: Performed by: OBSTETRICS & GYNECOLOGY

## 2024-12-28 PROCEDURE — 25000003 PHARM REV CODE 250: Performed by: OBSTETRICS & GYNECOLOGY

## 2024-12-28 RX ORDER — IBUPROFEN 600 MG/1
600 TABLET ORAL EVERY 6 HOURS PRN
Qty: 30 TABLET | Refills: 0 | Status: SHIPPED | OUTPATIENT
Start: 2024-12-28

## 2024-12-28 RX ORDER — OXYCODONE AND ACETAMINOPHEN 5; 325 MG/1; MG/1
1-2 TABLET ORAL EVERY 4 HOURS PRN
Qty: 10 TABLET | Refills: 0 | Status: SHIPPED | OUTPATIENT
Start: 2024-12-28

## 2024-12-28 RX ADMIN — IBUPROFEN 600 MG: 200 TABLET, FILM COATED ORAL at 01:12

## 2024-12-28 RX ADMIN — Medication: at 12:12

## 2024-12-28 RX ADMIN — ACETAMINOPHEN 650 MG: 325 TABLET ORAL at 10:12

## 2024-12-28 RX ADMIN — PRENATAL VIT W/ FE FUMARATE-FA TAB 27-0.8 MG 1 TABLET: 27-0.8 TAB at 10:12

## 2024-12-28 NOTE — PLAN OF CARE
12/28/24 1241   Final Note   Assessment Type Final Discharge Note   Anticipated Discharge Disposition Home   Post-Acute Status   Discharge Delays None known at this time     Patient cleared for discharge from case management standpoint.

## 2024-12-28 NOTE — DISCHARGE INSTRUCTIONS

## 2024-12-28 NOTE — LACTATION NOTE
12/28/24 1150   Maternal Assessment   Breast Shape round   Breast Density soft   Areola elastic   Nipples everted   Left Nipple Symptoms tender;redness;cracked   Maternal Infant Feeding   Maternal Emotional State assist needed   Infant Positioning clutch/football   Signs of Milk Transfer audible swallow;infant jaw motion present   Pain with Feeding no   Comfort Measures Before/During Feeding infant position adjusted;latch adjusted;maternal position adjusted   Comfort Measures Following Feeding expressed milk applied;air-drying encouraged  (lanolin provided)   Latch Assistance yes     Assisted with latching infant to right breast. Deep latch achieved. Audible swallows noted.   Instructed on the signs of an effective feeding.  Discussed positioning, comfortable latch, rhythmic, nutritive sucking, audible swallows, appropriate length of feeding, comfort of latch and evaluating for fullness cues. Discussed appropriate output for age. Breastfeeding discharge instructions reviewed and engorgement precautions given.  Pt states understanding and verbalized appropriate recall.

## 2024-12-28 NOTE — DISCHARGE SUMMARY
[facility]  Discharge Summary  Obstetrics - Triage      Admit Date: 2024    Discharge Date and Time:  2024 12:35 PM    Attending Physician: Marisol Marquez MD     Discharge Provider: Marisol Marquez    Reason for Admission:  IUP at 39, normal spontaneous labor    Hospital Course (synopsis of major diagnoses, care, treatment, and services provided during the course of the hospital stay):     Telma Greenberg is a 34 y.o.  female who presented to labor and delivery at 39 weeks in spontaneous labor.  She had a spontaneous vaginal delivery followed by a hemorrhage and postpartum D and C for retained placenta as well as placement of the Brenda device.  After the Brenda was removed her bleeding was scant and has remained that way.  She received 1 unit of packed red blood cells.  The remainder of her postpartum course was unremarkable.  Her hemoglobin equilibrated at 7, but she was asymptomatic.  Throughout her stay her vital signs were stable and she was afebrile except for immediately after she was given Cytotec.  On postpartum day 2. She was without complaints, her vaginal bleeding is minimal, and she is meeting criteria for discharge home.  She will be discharged with written instructions, a prescription for pain medicine, and she will follow up with me in 6 weeks or sooner as needed.    Gen - NAD  Uterus - firm below umbilicus, non tender  Ext - no edema, no calf tenderness ..    She was admitted to the labor and delivery triage area. Vital signs on admit were: Temp: 98.3 °F (36.8 °C), Pulse: 101, Resp: 16, BP: (!) 156/91, SpO2: 99 %.        Final Diagnoses:    Principal Problem: Normal labor   Secondary Diagnoses:  hemorrhage    Discharged Condition: good    Disposition: Home or Self Care    Follow Up/Patient Instructions:     Medications:  Reconciled Home Medications:      Medication List        START taking these medications      ibuprofen 600 MG tablet  Commonly known as:  ADVIL,MOTRIN  Take 1 tablet (600 mg total) by mouth every 6 (six) hours as needed (cramping).     oxyCODONE-acetaminophen 5-325 mg per tablet  Commonly known as: PERCOCET  Take 1-2 tablets by mouth every 4 (four) hours as needed for Pain.            STOP taking these medications      albuterol 90 mcg/actuation inhaler  Commonly known as: VENTOLIN HFA     aspirin 81 MG Chew     butalbital-acetaminophen-caffeine -40 mg -40 mg per tablet  Commonly known as: FIORICET, ESGIC     magnesium oxide 400 mg (241.3 mg magnesium) tablet  Commonly known as: MAG-OX     PRENATAL-1 ORAL     progesterone 100 MG capsule  Commonly known as: PROMETRIUM            Discharge Procedure Orders   Diet Adult Regular     Lifting restrictions   Scheduling Instructions: No lifting greater than 10 # for 6 weeks     No driving until:   Order Comments: No driving for 2 weeks      Follow-up Information       Marisol Marquez MD Follow up in 6 week(s).    Specialties: Obstetrics, Obstetrics and Gynecology  Contact information:  [address] OBSTETRICS & GYNECOLOGY  [facility] [id number] [phone]               Marisol Marquez MD. Schedule an appointment as soon as possible for a visit in 6 week(s).    Specialties: Obstetrics, Obstetrics and Gynecology  Why: For follow up  Contact information:  [address] OBSTETRICS & GYNECOLOGY  [facility] [id number] [phone]

## 2024-12-30 ENCOUNTER — HOSPITAL ENCOUNTER (INPATIENT)
Facility: HOSPITAL | Age: 34
LOS: 2 days | Discharge: HOME | DRG: 776 | End: 2025-01-02
Attending: EMERGENCY MEDICINE | Admitting: OBSTETRICS & GYNECOLOGY
Payer: COMMERCIAL

## 2024-12-30 ENCOUNTER — TELEPHONE (OUTPATIENT)
Dept: CARDIOLOGY | Facility: CLINIC | Age: 34
End: 2024-12-30
Payer: COMMERCIAL

## 2024-12-30 DIAGNOSIS — D64.9 SYMPTOMATIC ANEMIA: Primary | ICD-10-CM

## 2024-12-30 DIAGNOSIS — R06.02 SOB (SHORTNESS OF BREATH): ICD-10-CM

## 2024-12-30 LAB
ALBUMIN SERPL BCP-MCNC: 3.3 G/DL (ref 3.5–5.2)
ALP SERPL-CCNC: 113 U/L (ref 55–135)
ALT SERPL W/O P-5'-P-CCNC: 61 U/L (ref 10–44)
ANION GAP SERPL CALC-SCNC: 8 MMOL/L (ref 8–16)
AST SERPL-CCNC: 96 U/L (ref 10–40)
BACTERIA #/AREA URNS HPF: ABNORMAL /HPF
BASOPHILS # BLD AUTO: 0.01 K/UL (ref 0–0.2)
BASOPHILS NFR BLD: 0.1 % (ref 0–1.9)
BILIRUB SERPL-MCNC: 0.3 MG/DL (ref 0.1–1)
BILIRUB UR QL STRIP: NEGATIVE
BLD PROD TYP BPU: NORMAL
BLD PROD TYP BPU: NORMAL
BLOOD UNIT EXPIRATION DATE: NORMAL
BLOOD UNIT EXPIRATION DATE: NORMAL
BLOOD UNIT TYPE CODE: 5100
BLOOD UNIT TYPE CODE: 5100
BLOOD UNIT TYPE: NORMAL
BLOOD UNIT TYPE: NORMAL
BNP SERPL-MCNC: 129 PG/ML (ref 0–99)
BUN SERPL-MCNC: 9 MG/DL (ref 6–20)
CALCIUM SERPL-MCNC: 8.3 MG/DL (ref 8.7–10.5)
CHLORIDE SERPL-SCNC: 106 MMOL/L (ref 95–110)
CLARITY UR: ABNORMAL
CO2 SERPL-SCNC: 24 MMOL/L (ref 23–29)
CODING SYSTEM: NORMAL
CODING SYSTEM: NORMAL
COLOR UR: ABNORMAL
CREAT SERPL-MCNC: 0.6 MG/DL (ref 0.5–1.4)
CROSSMATCH INTERPRETATION: NORMAL
CROSSMATCH INTERPRETATION: NORMAL
DIFFERENTIAL METHOD BLD: ABNORMAL
DISPENSE STATUS: NORMAL
DISPENSE STATUS: NORMAL
EOSINOPHIL # BLD AUTO: 0.1 K/UL (ref 0–0.5)
EOSINOPHIL NFR BLD: 1 % (ref 0–8)
ERYTHROCYTE [DISTWIDTH] IN BLOOD BY AUTOMATED COUNT: 19.8 % (ref 11.5–14.5)
EST. GFR  (NO RACE VARIABLE): >60 ML/MIN/1.73 M^2
GLUCOSE SERPL-MCNC: 78 MG/DL (ref 70–110)
GLUCOSE UR QL STRIP: NEGATIVE
HCT VFR BLD AUTO: 24.8 % (ref 37–48.5)
HGB BLD-MCNC: 7.7 G/DL (ref 12–16)
HGB UR QL STRIP: ABNORMAL
HYALINE CASTS #/AREA URNS LPF: 0 /LPF
IMM GRANULOCYTES # BLD AUTO: 0.15 K/UL (ref 0–0.04)
IMM GRANULOCYTES NFR BLD AUTO: 1.2 % (ref 0–0.5)
INR PPP: 0.9 (ref 0.8–1.2)
KETONES UR QL STRIP: NEGATIVE
LEUKOCYTE ESTERASE UR QL STRIP: ABNORMAL
LYMPHOCYTES # BLD AUTO: 1.9 K/UL (ref 1–4.8)
LYMPHOCYTES NFR BLD: 14.5 % (ref 18–48)
MCH RBC QN AUTO: 26.2 PG (ref 27–31)
MCHC RBC AUTO-ENTMCNC: 31 G/DL (ref 32–36)
MCV RBC AUTO: 84 FL (ref 82–98)
MICROSCOPIC COMMENT: ABNORMAL
MONOCYTES # BLD AUTO: 0.6 K/UL (ref 0.3–1)
MONOCYTES NFR BLD: 4.6 % (ref 4–15)
NEUTROPHILS # BLD AUTO: 10.2 K/UL (ref 1.8–7.7)
NEUTROPHILS NFR BLD: 78.6 % (ref 38–73)
NITRITE UR QL STRIP: NEGATIVE
NRBC BLD-RTO: 0 /100 WBC
NUM UNITS TRANS PACKED RBC: NORMAL
NUM UNITS TRANS PACKED RBC: NORMAL
OHS QRS DURATION: 76 MS
OHS QTC CALCULATION: 440 MS
PH UR STRIP: 8 [PH] (ref 5–8)
PLATELET # BLD AUTO: 219 K/UL (ref 150–450)
PLATELET BLD QL SMEAR: ABNORMAL
PMV BLD AUTO: 10.1 FL (ref 9.2–12.9)
POTASSIUM SERPL-SCNC: 4 MMOL/L (ref 3.5–5.1)
PROT SERPL-MCNC: 5.9 G/DL (ref 6–8.4)
PROT UR QL STRIP: ABNORMAL
PROTHROMBIN TIME: 9.8 SEC (ref 9–12.5)
RBC # BLD AUTO: 2.94 M/UL (ref 4–5.4)
RBC #/AREA URNS HPF: >100 /HPF (ref 0–4)
SODIUM SERPL-SCNC: 138 MMOL/L (ref 136–145)
SP GR UR STRIP: 1.01 (ref 1–1.03)
SQUAMOUS #/AREA URNS HPF: 10 /HPF
TROPONIN I SERPL HS-MCNC: 19.6 PG/ML (ref 0–14.9)
TROPONIN I SERPL HS-MCNC: 22.1 PG/ML (ref 0–14.9)
TSH SERPL DL<=0.005 MIU/L-ACNC: 1.8 UIU/ML (ref 0.34–5.6)
URN SPEC COLLECT METH UR: ABNORMAL
UROBILINOGEN UR STRIP-ACNC: NEGATIVE EU/DL
WBC # BLD AUTO: 12.94 K/UL (ref 3.9–12.7)
WBC #/AREA URNS HPF: >100 /HPF (ref 0–5)

## 2024-12-30 PROCEDURE — 25500020 PHARM REV CODE 255: Performed by: EMERGENCY MEDICINE

## 2024-12-30 PROCEDURE — 85025 COMPLETE CBC W/AUTO DIFF WBC: CPT | Performed by: NURSE PRACTITIONER

## 2024-12-30 PROCEDURE — 87086 URINE CULTURE/COLONY COUNT: CPT | Performed by: EMERGENCY MEDICINE

## 2024-12-30 PROCEDURE — 84484 ASSAY OF TROPONIN QUANT: CPT | Performed by: NURSE PRACTITIONER

## 2024-12-30 PROCEDURE — 85610 PROTHROMBIN TIME: CPT | Performed by: NURSE PRACTITIONER

## 2024-12-30 PROCEDURE — G0378 HOSPITAL OBSERVATION PER HR: HCPCS

## 2024-12-30 PROCEDURE — 84484 ASSAY OF TROPONIN QUANT: CPT | Mod: 91 | Performed by: EMERGENCY MEDICINE

## 2024-12-30 PROCEDURE — 84443 ASSAY THYROID STIM HORMONE: CPT | Performed by: NURSE PRACTITIONER

## 2024-12-30 PROCEDURE — 83880 ASSAY OF NATRIURETIC PEPTIDE: CPT | Performed by: NURSE PRACTITIONER

## 2024-12-30 PROCEDURE — 25000003 PHARM REV CODE 250: Performed by: EMERGENCY MEDICINE

## 2024-12-30 PROCEDURE — 81001 URINALYSIS AUTO W/SCOPE: CPT | Performed by: EMERGENCY MEDICINE

## 2024-12-30 PROCEDURE — 80053 COMPREHEN METABOLIC PANEL: CPT | Performed by: NURSE PRACTITIONER

## 2024-12-30 PROCEDURE — 86901 BLOOD TYPING SEROLOGIC RH(D): CPT | Performed by: EMERGENCY MEDICINE

## 2024-12-30 RX ORDER — LANOLIN ALCOHOL/MO/W.PET/CERES
400 CREAM (GRAM) TOPICAL DAILY
Status: ON HOLD | COMMUNITY
End: 2025-01-02 | Stop reason: HOSPADM

## 2024-12-30 RX ORDER — HYDROCODONE BITARTRATE AND ACETAMINOPHEN 500; 5 MG/1; MG/1
TABLET ORAL
Status: DISCONTINUED | OUTPATIENT
Start: 2024-12-30 | End: 2025-01-02 | Stop reason: HOSPADM

## 2024-12-30 RX ORDER — FERROUS SULFATE 325(65) MG
325 TABLET, DELAYED RELEASE (ENTERIC COATED) ORAL
COMMUNITY

## 2024-12-30 RX ORDER — DOCUSATE SODIUM 100 MG/1
100 CAPSULE, LIQUID FILLED ORAL 2 TIMES DAILY
COMMUNITY

## 2024-12-30 RX ADMIN — SODIUM CHLORIDE 500 ML: 9 INJECTION, SOLUTION INTRAVENOUS at 09:12

## 2024-12-30 RX ADMIN — IOHEXOL 100 ML: 350 INJECTION, SOLUTION INTRAVENOUS at 07:12

## 2024-12-30 NOTE — Clinical Note
Diagnosis: Symptomatic anemia [[id number]]   Future Attending Provider: HERI HODGSON [[id number]]   Place in Observation: [facility] [[id number]]   Bed request comments: admit to 2100   Special Needs:: No Special Needs [1]

## 2024-12-30 NOTE — FIRST PROVIDER EVALUATION
Emergency Department TeleTriage Encounter Note      CHIEF COMPLAINT    Chief Complaint   Patient presents with    Shortness of Breath     S/P VAG DELIVERY 4 DAYS AGO    Palpitations     X 1 DAY       VITAL SIGNS   Initial Vitals [12/30/24 1432]   BP Pulse Resp Temp SpO2   (!) 158/100 (!) 120 (!) 22 98.3 °F (36.8 °C) 98 %      MAP       --            ALLERGIES    Review of patient's allergies indicates:  No Known Allergies    PROVIDER TRIAGE NOTE  Verbal consent for the teletriage evaluation was given by the patient at the start of the evaluation.  All efforts will be made to maintain patient's privacy during the evaluation.      This is a teletriage evaluation of a 34 y.o. female presenting to the ED with c/o elevated HR, palpitations, SOB on exertion that started yesterday.  S/P vaginal delivery on 12/26/2024. Limited physical exam via telehealth: The patient is awake, alert, answering questions appropriately and is not in respiratory distress.  As the Teletriage provider, I performed an initial assessment and ordered appropriate labs and imaging studies, if any, to facilitate the patient's care once placed in the ED. Once a room is available, care and a full evaluation will be completed by an alternate ED provider.  Any additional orders and the final disposition will be determined by that provider.  All imaging and labs will not be followed-up by the Teletriage Team, including myself.       Message sent to provider staff to discuss D-dimer and CTA orders    ORDERS  Labs Reviewed   CBC W/ AUTO DIFFERENTIAL   COMPREHENSIVE METABOLIC PANEL   TROPONIN I HIGH SENSITIVITY   B-TYPE NATRIURETIC PEPTIDE   PROTIME-INR   TSH       ED Orders (720h ago, onward)      Start Ordered     Status Ordering Provider    12/30/24 1457 12/30/24 1456  TSH  STAT         Ordered VIVIANA CHUN    12/30/24 1454 12/30/24 1456  Saline lock IV  Once         Ordered VIVIANA CHUN    12/30/24 1454 12/30/24 1456  Pulse Oximetry Continuous   Continuous         Ordered VIVIANA CHUN    12/30/24 1454 12/30/24 1456  Cardiac Monitoring - Adult  Continuous        Comments: Notify Physician If:    Ordered VIVIANA CHUN    12/30/24 1453 12/30/24 1456  CBC Auto Differential  STAT         Ordered VIVIANA CHUN    12/30/24 1453 12/30/24 1456  Comprehensive Metabolic Panel  STAT         Ordered VIVIANA CHUN    12/30/24 1453 12/30/24 1456  Troponin I High Sensitivity  STAT         Ordered VIVIANA CHUN    12/30/24 1453 12/30/24 1456  BNP  STAT         Ordered VIVIANA CHUN    12/30/24 1453 12/30/24 1456  EKG 12-lead  Once         Ordered VIVIANA CHUN    12/30/24 1453 12/30/24 1456  Protime-INR  STAT         Ordered VIVIANA CHUN    12/30/24 1453 12/30/24 1456  X-Ray Chest PA And Lateral  1 time imaging         Ordered VIVIANA CHUN              Virtual Visit Note: The provider triage portion of this emergency department evaluation and documentation was performed via [employer], a HIPAA-compliant telemedicine application, in concert with a tele-presenter in the room. A face to face patient evaluation with one of my colleagues will occur once the patient is placed in an emergency department room.      DISCLAIMER: This note was prepared with [employer] voice recognition transcription software. Garbled syntax, mangled pronouns, and other bizarre constructions may be attributed to that software system.

## 2024-12-30 NOTE — TELEPHONE ENCOUNTER
----- Message from Alia sent at 12/30/2024  1:37 PM CST -----  Contact: self  Type: Needs Medical Advice  Who Called:  pt  Symptoms (please be specific):  pt she just delivered her son and her heart rate is running between 102 and 157 just resting.pt states she can hear her heartbeat in her ears.pt's obgyn wants her to go to er but she states due to insurance it will not cover er visit only office visits.   How long has patient had these symptoms:  since yesterday  Pharmacy name and phone #:    [facility] [address] [facility] [address]  [address] [place], [place] [address]  [facility] [address]  Phone: [phone] Fax: [phone]     Best Call Back Number: [phone]   Additional Information: pt would like to see if she can get in today.please call

## 2024-12-30 NOTE — ED PROVIDER NOTES
Encounter Date: 12/30/2024       History     Chief Complaint   Patient presents with    Shortness of Breath     S/P VAG DELIVERY 4 DAYS AGO    Palpitations     X 1 DAY     34-year-old female with a past medical history of a PFO, depression, and hyperlipidemia presents with multiple complaints.  The patient reports over the last couple of days she has started with shortness of breath, an elevated heart rate, palpitations, chest tightness, and elevated blood pressure.  She reports that she is status post vaginal delivery 4 days ago.  She denies any complications with the birth.  She also reports some associated swelling to her lower extremities.  There are no alleviating or aggravating factors.  There is no reported nausea/vomiting, cough, congestion, fever/chills, or diarrhea.      Review of patient's allergies indicates:  No Known Allergies  Past Medical History:   Diagnosis Date    Abnormal Pap smear of cervix 06/2014    colpo done no bx bc was pregnant    Depression     1st pregnancy    HPV in female     Hyperlipidemia     Hypotension     Migraines     PFO (patent foramen ovale) 2023     No past surgical history on file.  Family History   Problem Relation Name Age of Onset    Breast cancer Maternal Grandmother      Rectal cancer Maternal Grandmother      Lung cancer Father          smoker    Pneumonia Mother          smoker    Stroke Brother      No Known Problems Brother      Alcohol abuse Brother      Alcohol abuse Brother      Alzheimer's disease Paternal Grandmother      Colon cancer Neg Hx      Ovarian cancer Neg Hx       Social History     Tobacco Use    Smoking status: Never    Smokeless tobacco: Never   Substance Use Topics    Alcohol use: No     Comment: former , minimal    Drug use: No     Review of Systems   Constitutional:  Negative for chills and fever.   HENT:  Negative for congestion.    Respiratory:  Positive for shortness of breath. Negative for cough.    Cardiovascular:  Positive for chest pain,  palpitations and leg swelling.   Gastrointestinal:  Negative for abdominal pain, nausea and vomiting.   Genitourinary:  Negative for dysuria.   Musculoskeletal:  Negative for gait problem.   Skin:  Negative for color change.   Neurological:  Negative for dizziness and numbness.   Psychiatric/Behavioral:  Negative for agitation.        Physical Exam     Initial Vitals [12/30/24 1432]   BP Pulse Resp Temp SpO2   (!) 158/100 (!) 120 (!) 22 98.3 °F (36.8 °C) 98 %      MAP       --         Physical Exam    Nursing note and vitals reviewed.  Constitutional: She appears well-developed and well-nourished.   Pale appearing   HENT:   Head: Normocephalic and atraumatic.   Eyes: EOM are normal. Pupils are equal, round, and reactive to light.   Neck:   Normal range of motion.  Cardiovascular:  Regular rhythm.           Tachycardic.   Pulmonary/Chest: Breath sounds normal.   Abdominal: Abdomen is soft. Bowel sounds are normal. She exhibits no distension. There is no abdominal tenderness. There is no rebound and no guarding.   Musculoskeletal:         General: Edema present. Normal range of motion.      Right shoulder: Normal.      Left shoulder: Normal.      Cervical back: Normal range of motion.     Neurological: She is alert and oriented to person, place, and time.   Skin: Skin is warm and dry.   Psychiatric: She has a normal mood and affect.         ED Course   Critical Care    Date/Time: 12/30/2024 9:54 PM    Performed by: Jorge Rondon MD  Authorized by: Jorge Rondon MD  Direct patient critical care time: 18 minutes  Ordering / reviewing critical care time: 16 minutes  Documentation critical care time: 14 minutes  Consulting other physicians critical care time: 8 minutes  Total critical care time (exclusive of procedural time) : 56 minutes  Critical care was time spent personally by me on the following activities: development of treatment plan with patient or surrogate, examination of patient, obtaining history  from patient or surrogate, ordering and performing treatments and interventions, ordering and review of laboratory studies and ordering and review of radiographic studies.        Labs Reviewed   CBC W/ AUTO DIFFERENTIAL - Abnormal       Result Value    WBC 12.94 (*)     RBC 2.94 (*)     Hemoglobin 7.7 (*)     Hematocrit 24.8 (*)     MCV 84      MCH 26.2 (*)     MCHC 31.0 (*)     RDW 19.8 (*)     Platelets 219      MPV 10.1      Immature Granulocytes 1.2 (*)     Gran # (ANC) 10.2 (*)     Immature Grans (Abs) 0.15 (*)     Lymph # 1.9      Mono # 0.6      Eos # 0.1      Baso # 0.01      nRBC 0      Gran % 78.6 (*)     Lymph % 14.5 (*)     Mono % 4.6      Eosinophil % 1.0      Basophil % 0.1      Platelet Estimate Appears normal      Differential Method Automated     COMPREHENSIVE METABOLIC PANEL - Abnormal    Sodium 138      Potassium 4.0      Chloride 106      CO2 24      Glucose 78      BUN 9      Creatinine 0.6      Calcium 8.3 (*)     Total Protein 5.9 (*)     Albumin 3.3 (*)     Total Bilirubin 0.3      Alkaline Phosphatase 113      AST 96 (*)     ALT 61 (*)     eGFR >60.0      Anion Gap 8     TROPONIN I HIGH SENSITIVITY - Abnormal    Troponin I High Sensitivity 22.1 (*)    B-TYPE NATRIURETIC PEPTIDE - Abnormal     (*)    URINALYSIS, REFLEX TO URINE CULTURE - Abnormal    Specimen UA Urine, Clean Catch      Color, UA Orange (*)     Appearance, UA Hazy (*)     pH, UA 8.0      Specific Gravity, UA 1.015      Protein, UA 1+ (*)     Glucose, UA Negative      Ketones, UA Negative      Bilirubin (UA) Negative      Occult Blood UA 3+ (*)     Nitrite, UA Negative      Urobilinogen, UA Negative      Leukocytes, UA 3+ (*)     Narrative:     Specimen Source->Urine   URINALYSIS MICROSCOPIC - Abnormal    RBC, UA >100 (*)     WBC, UA >100 (*)     Bacteria Occasional      Squam Epithel, UA 10      Hyaline Casts, UA 0      Microscopic Comment SEE COMMENT      Narrative:     Specimen Source->Urine   TROPONIN I HIGH  SENSITIVITY - Abnormal    Troponin I High Sensitivity 19.6 (*)    CULTURE, URINE   PROTIME-INR    Prothrombin Time 9.8      INR 0.9     TSH    TSH 1.804     TYPE & SCREEN   PREPARE RBC SOFT     EKG Readings: (Independently Interpreted)   Initial Reading: No STEMI. Rhythm: Sinus Tachycardia. Heart Rate: 103. Ectopy: No Ectopy. Conduction: Normal. ST Segments: Normal ST Segments. T Waves: Normal. Clinical Impression: Normal Sinus Rhythm     ECG Results              EKG 12-lead (In process)        Collection Time Result Time QRS Duration OHS QTC Calculation    12/30/24 14:47:05 12/30/24 15:36:35 76 440                     In process by Interface, Lab In [facility] (12/30/24 15:36:39)                   Narrative:    Test Reason : R06.02,    Vent. Rate : 103 BPM     Atrial Rate : 103 BPM     P-R Int : 136 ms          QRS Dur :  76 ms      QT Int : 336 ms       P-R-T Axes :  65  18  48 degrees    QTcB Int : 440 ms    Sinus tachycardia  Possible Left atrial enlargement  Low voltage QRS  Borderline Abnormal ECG  No previous ECGs available    Referred By: AAAREFERRAL SELF           Confirmed By:                       In process by Interface, Lab In [facility] (12/30/24 15:36:04)                   Narrative:    Test Reason : R06.02,    Vent. Rate : 103 BPM     Atrial Rate : 103 BPM     P-R Int : 136 ms          QRS Dur :  76 ms      QT Int : 336 ms       P-R-T Axes :  65  18  48 degrees    QTcB Int : 440 ms    Sinus tachycardia  Possible Left atrial enlargement  Low voltage QRS  Borderline Abnormal ECG  No previous ECGs available    Referred By: AAAREFERRAL SELF           Confirmed By:                                   Imaging Results              CTA Chest Non-Coronary (PE Studies) (Final result)  Result time 12/30/24 20:09:05      Final result by Gino Bush MD (12/30/24 20:09:05)                   Impression:      No convincing evidence of pulmonary thromboembolism through the level of the proximal segmental  arteries.    No confluent airspace consolidation or evidence to suggest pulmonary infarct.    Mild nonspecific splenomegaly.    Additional findings as above.      Electronically signed by: Gino Bush MD  Date:    12/30/2024  Time:    20:09               Narrative:    EXAMINATION:  CTA CHEST NON CORONARY (PE STUDIES)    CLINICAL HISTORY:  Pulmonary embolism (PE) suspected, high prob;    TECHNIQUE:  Low dose axial images, sagittal and coronal reformations were obtained from the thoracic inlet to the lung bases following the IV administration of 100 mL of Omnipaque 350.  Contrast timing was optimized to evaluate the pulmonary arteries.    COMPARISON:  Chest radiograph 12/30/2024    FINDINGS:  The visualized soft tissue structures at the base of the neck appear within normal limits allowing from streak artifact from dense contrast bolus.    The thoracic aorta maintains normal caliber, contour, and course without significant atherosclerotic calcification within its course.  There is no evidence of aneurysmal dilation or dissection. The heart is not enlarged and there is no significant pericardial effusion. The esophagus maintains a normal course and caliber. There are scattered small bilateral axillary lymph nodes.  There is no bulky mediastinal lymph node enlargement identified.    The trachea is midline and the proximal airways are patent. Detailed evaluation of the lung parenchyma is limited by respiratory motion artifact. There is no pneumothorax. There is mild atelectasis at the left lung base.  There is no large confluent airspace consolidation.  There is no significant volume of pleural fluid.    No convincing evidence of pulmonary thromboembolism through the level of the proximal segmental arteries.    Limited images of the upper abdomen obtained during the course of this dedicated thoracic CT demonstrate no acute abnormalities.  There is mild nonspecific splenomegaly.    The osseous structures appear  intact.                                       X-Ray Chest PA And Lateral (Final result)  Result time 12/30/24 16:01:54      Final result by Tash Sweeney MD (12/30/24 16:01:54)                   Impression:      Normal chest.      Electronically signed by: Tash Sweeney  Date:    12/30/2024  Time:    16:01               Narrative:    EXAMINATION:  XR CHEST PA AND LATERAL    CLINICAL HISTORY:  SOB;    FINDINGS:  PA and lateral chest without comparisons shows normal cardiomediastinal silhouette.    Lungs are clear. Pulmonary vasculature is normal. No acute osseous abnormality.                                       Medications   sodium chloride 0.9% bolus 500 mL 500 mL (500 mLs Intravenous New Bag 12/30/24 2122)   0.9%  NaCl infusion (for blood administration) (has no administration in time range)   iohexoL (OMNIPAQUE 350) injection 100 mL (100 mLs Intravenous Given 12/30/24 1924)     Medical Decision Making  34-year-old female presented with multiple complaints.    Initial differential diagnosis included but not limited to pulmonary embolism, heart failure, and anemia.    Amount and/or Complexity of Data Reviewed  Labs: ordered.  Radiology: ordered.  ECG/medicine tests: ordered.    Risk  Prescription drug management.  Risk Details: The patient was emergently evaluated in the emergency department, her evaluation was significant for a young female who is pale appearing.  The patient was orthostatic vital signs here with her heart rate going up into the 120s.  The patient's EKG showed no acute abnormalities per my independent interpretation.  The patient's labs were significant for anemia as well as a mild elevation in her LFTs.  The patient's chest x-ray and CT scan of her chest showed no acute abnormalities per Radiology.  The patient likely has symptomatic anemia.  She was started on IV fluids here in the emergency department.  The case was discussed with, Dr. Harry the OBGYN physician on-call.  I will admit  the patient to postpartum overnight for blood transfusion.  The patient is in agreement with this plan.                                      Clinical Impression:  Final diagnoses:  [R06.02] SOB (shortness of breath)  [D64.9] Symptomatic anemia (Primary)          ED Disposition Condition    Observation Stable                Jorge Rondon MD  12/30/24 4008

## 2024-12-31 LAB
ABO + RH BLD: NORMAL
ALBUMIN SERPL BCP-MCNC: 3.1 G/DL (ref 3.5–5.2)
ALP SERPL-CCNC: 99 U/L (ref 55–135)
ALT SERPL W/O P-5'-P-CCNC: 43 U/L (ref 10–44)
ANION GAP SERPL CALC-SCNC: 7 MMOL/L (ref 8–16)
AST SERPL-CCNC: 46 U/L (ref 10–40)
BILIRUB SERPL-MCNC: 0.4 MG/DL (ref 0.1–1)
BLD GP AB SCN CELLS X3 SERPL QL: NORMAL
BUN SERPL-MCNC: 8 MG/DL (ref 6–20)
CALCIUM SERPL-MCNC: 7.7 MG/DL (ref 8.7–10.5)
CHLORIDE SERPL-SCNC: 109 MMOL/L (ref 95–110)
CO2 SERPL-SCNC: 23 MMOL/L (ref 23–29)
CREAT SERPL-MCNC: 0.7 MG/DL (ref 0.5–1.4)
CREAT UR-MCNC: 37.3 MG/DL (ref 15–325)
ERYTHROCYTE [DISTWIDTH] IN BLOOD BY AUTOMATED COUNT: 19.5 % (ref 11.5–14.5)
EST. GFR  (NO RACE VARIABLE): >60 ML/MIN/1.73 M^2
GLUCOSE SERPL-MCNC: 122 MG/DL (ref 70–110)
HCT VFR BLD AUTO: 26.7 % (ref 37–48.5)
HCT VFR BLD AUTO: 27.5 % (ref 37–48.5)
HGB BLD-MCNC: 8.3 G/DL (ref 12–16)
HGB BLD-MCNC: 8.8 G/DL (ref 12–16)
MCH RBC QN AUTO: 26.6 PG (ref 27–31)
MCHC RBC AUTO-ENTMCNC: 32 G/DL (ref 32–36)
MCV RBC AUTO: 83 FL (ref 82–98)
PLATELET # BLD AUTO: 200 K/UL (ref 150–450)
PMV BLD AUTO: 9.3 FL (ref 9.2–12.9)
POTASSIUM SERPL-SCNC: 4.3 MMOL/L (ref 3.5–5.1)
PROT SERPL-MCNC: 5.4 G/DL (ref 6–8.4)
PROT UR-MCNC: 18 MG/DL (ref 6–15)
PROT/CREAT UR: 0.48 MG/G{CREAT} (ref 0–0.2)
RBC # BLD AUTO: 3.31 M/UL (ref 4–5.4)
SODIUM SERPL-SCNC: 139 MMOL/L (ref 136–145)
SPECIMEN OUTDATE: NORMAL
WBC # BLD AUTO: 11.23 K/UL (ref 3.9–12.7)

## 2024-12-31 PROCEDURE — 82570 ASSAY OF URINE CREATININE: CPT | Performed by: OBSTETRICS & GYNECOLOGY

## 2024-12-31 PROCEDURE — 85014 HEMATOCRIT: CPT | Performed by: OBSTETRICS & GYNECOLOGY

## 2024-12-31 PROCEDURE — 12000002 HC ACUTE/MED SURGE SEMI-PRIVATE ROOM

## 2024-12-31 PROCEDURE — 36430 TRANSFUSION BLD/BLD COMPNT: CPT

## 2024-12-31 PROCEDURE — 25000003 PHARM REV CODE 250: Performed by: OBSTETRICS & GYNECOLOGY

## 2024-12-31 PROCEDURE — 63600175 PHARM REV CODE 636 W HCPCS: Performed by: OBSTETRICS & GYNECOLOGY

## 2024-12-31 PROCEDURE — 80053 COMPREHEN METABOLIC PANEL: CPT | Performed by: OBSTETRICS & GYNECOLOGY

## 2024-12-31 PROCEDURE — 36415 COLL VENOUS BLD VENIPUNCTURE: CPT | Performed by: OBSTETRICS & GYNECOLOGY

## 2024-12-31 PROCEDURE — 85018 HEMOGLOBIN: CPT | Performed by: OBSTETRICS & GYNECOLOGY

## 2024-12-31 PROCEDURE — P9016 RBC LEUKOCYTES REDUCED: HCPCS | Performed by: EMERGENCY MEDICINE

## 2024-12-31 PROCEDURE — 85027 COMPLETE CBC AUTOMATED: CPT | Performed by: OBSTETRICS & GYNECOLOGY

## 2024-12-31 PROCEDURE — 86920 COMPATIBILITY TEST SPIN: CPT | Performed by: EMERGENCY MEDICINE

## 2024-12-31 RX ORDER — HYDRALAZINE HYDROCHLORIDE 20 MG/ML
10 INJECTION INTRAMUSCULAR; INTRAVENOUS ONCE AS NEEDED
Status: DISCONTINUED | OUTPATIENT
Start: 2024-12-31 | End: 2025-01-02 | Stop reason: HOSPADM

## 2024-12-31 RX ORDER — OXYCODONE AND ACETAMINOPHEN 5; 325 MG/1; MG/1
1 TABLET ORAL EVERY 4 HOURS PRN
Status: DISCONTINUED | OUTPATIENT
Start: 2024-12-31 | End: 2025-01-02 | Stop reason: HOSPADM

## 2024-12-31 RX ORDER — MAGNESIUM SULFATE HEPTAHYDRATE 40 MG/ML
4 INJECTION, SOLUTION INTRAVENOUS ONCE
Status: COMPLETED | OUTPATIENT
Start: 2024-12-31 | End: 2024-12-31

## 2024-12-31 RX ORDER — SODIUM CHLORIDE, SODIUM LACTATE, POTASSIUM CHLORIDE, CALCIUM CHLORIDE 600; 310; 30; 20 MG/100ML; MG/100ML; MG/100ML; MG/100ML
INJECTION, SOLUTION INTRAVENOUS CONTINUOUS
Status: DISCONTINUED | OUTPATIENT
Start: 2024-12-31 | End: 2025-01-01

## 2024-12-31 RX ORDER — LABETALOL HYDROCHLORIDE 5 MG/ML
80 INJECTION, SOLUTION INTRAVENOUS ONCE AS NEEDED
Status: DISCONTINUED | OUTPATIENT
Start: 2024-12-31 | End: 2025-01-02 | Stop reason: HOSPADM

## 2024-12-31 RX ORDER — LABETALOL HYDROCHLORIDE 5 MG/ML
40 INJECTION, SOLUTION INTRAVENOUS ONCE AS NEEDED
Status: DISCONTINUED | OUTPATIENT
Start: 2024-12-31 | End: 2025-01-02 | Stop reason: HOSPADM

## 2024-12-31 RX ORDER — MAGNESIUM SULFATE HEPTAHYDRATE 40 MG/ML
2 INJECTION, SOLUTION INTRAVENOUS CONTINUOUS
Status: DISCONTINUED | OUTPATIENT
Start: 2024-12-31 | End: 2025-01-01

## 2024-12-31 RX ORDER — CALCIUM GLUCONATE 98 MG/ML
1 INJECTION, SOLUTION INTRAVENOUS
Status: DISCONTINUED | OUTPATIENT
Start: 2024-12-31 | End: 2025-01-02 | Stop reason: HOSPADM

## 2024-12-31 RX ORDER — ACETAMINOPHEN 325 MG/1
650 TABLET ORAL ONCE
Status: COMPLETED | OUTPATIENT
Start: 2024-12-31 | End: 2024-12-31

## 2024-12-31 RX ORDER — OXYCODONE AND ACETAMINOPHEN 5; 325 MG/1; MG/1
2 TABLET ORAL EVERY 4 HOURS PRN
Status: DISCONTINUED | OUTPATIENT
Start: 2024-12-31 | End: 2025-01-02 | Stop reason: HOSPADM

## 2024-12-31 RX ORDER — HYDRALAZINE HYDROCHLORIDE 20 MG/ML
5 INJECTION INTRAMUSCULAR; INTRAVENOUS ONCE AS NEEDED
Status: DISCONTINUED | OUTPATIENT
Start: 2024-12-31 | End: 2025-01-02 | Stop reason: HOSPADM

## 2024-12-31 RX ORDER — IBUPROFEN 400 MG/1
800 TABLET ORAL EVERY 6 HOURS PRN
Status: DISCONTINUED | OUTPATIENT
Start: 2024-12-31 | End: 2025-01-02 | Stop reason: HOSPADM

## 2024-12-31 RX ORDER — LABETALOL HYDROCHLORIDE 5 MG/ML
20 INJECTION, SOLUTION INTRAVENOUS ONCE AS NEEDED
Status: DISCONTINUED | OUTPATIENT
Start: 2024-12-31 | End: 2025-01-02 | Stop reason: HOSPADM

## 2024-12-31 RX ORDER — DIPHENHYDRAMINE HCL 25 MG
25 CAPSULE ORAL ONCE
Status: COMPLETED | OUTPATIENT
Start: 2024-12-31 | End: 2024-12-31

## 2024-12-31 RX ADMIN — MAGNESIUM SULFATE HEPTAHYDRATE 4 G: 40 INJECTION, SOLUTION INTRAVENOUS at 01:12

## 2024-12-31 RX ADMIN — IBUPROFEN 800 MG: 400 TABLET, FILM COATED ORAL at 12:12

## 2024-12-31 RX ADMIN — IBUPROFEN 800 MG: 400 TABLET, FILM COATED ORAL at 09:12

## 2024-12-31 RX ADMIN — SODIUM CHLORIDE, POTASSIUM CHLORIDE, SODIUM LACTATE AND CALCIUM CHLORIDE: 600; 310; 30; 20 INJECTION, SOLUTION INTRAVENOUS at 12:12

## 2024-12-31 RX ADMIN — OXYCODONE HYDROCHLORIDE AND ACETAMINOPHEN 1 TABLET: 5; 325 TABLET ORAL at 10:12

## 2024-12-31 RX ADMIN — ACETAMINOPHEN 650 MG: 325 TABLET ORAL at 12:12

## 2024-12-31 RX ADMIN — DIPHENHYDRAMINE HYDROCHLORIDE 25 MG: 25 CAPSULE ORAL at 12:12

## 2024-12-31 NOTE — ED NOTES
Orthostatic VS       12/30/24 1757 12/30/24 1759 12/30/24 1800   Vital Signs   Pulse 108 (!) 111 (!) 125   Resp 18 18 18   BP (!) 155/88 (!) 151/89 (!) 147/89   MAP (mmHg) 115 115 113   BP Location Left arm Left arm Left arm   Patient Position Lying Sitting Standing

## 2024-12-31 NOTE — PROGRESS NOTES
Pt received her 1u PRBC and H/H up to 26.7/8.3.  Pt is with her  and does not feel any better.     She reports her headache started as a 'background' headache, but is now persistent.  They are worried about her elevated BP and urinary frequency.  She reports had swelling since delivery, but that is gone down.   Exam, no swelling or hyper-reflexia appreciated on exam.     She urinated in the ER 5 times, pt reports normal amounts, and she feels she has a UTI. She also has lower back pain that she correlates to her having a UTI.  I discussed that this does not seem to be a symptomatic UTI since she voiding a normal/large amount, and her back pain is quite low, and is probably due to uterine contractions.  Will keep track of her output for now.    BP's overnight was 120/80's, but went to 153/88 once awake. ER had mildly elevated LFT's. ER urine had 3+ leukocytes along with 3+,blood and 1+ protein.  Will do pre-e labs/urine today to determine if needs IV magnesium. Discussed getting clean catch urine, free of any vaginal blood.    I encouraged her to take a percocet for her headache, since both tylenol and motrin have not gotten rid of her headache, she will try that for now.    She is pumping during my exam since she is breastfeeding, and I told her she can take percocet while pumping.    Will assess for headaches and labs/urine once they return.

## 2024-12-31 NOTE — PROGRESS NOTES
[facility]  Obstetrics & Gynecology  Progress Note    Patient Name: Telma Greenberg  MRN: [id number]  Admission Date: 2024  Primary Care Provider: Caitlyn Hayward MD  Principal Problem: Anemia    Subjective:     HPI:  33yo , had  , pp Hemorrhage that required D&C with transfusion of 1u PRBC.     Presents to ER with palpitations, tingling and SOB.  H/H 24.8/7.7, was 23.6/7.7 upon discharge.  She was orthostatic with VS, heart rate up to 120's.  EKG showed no acute abnormalities per ER doc's interpretation.  Troponin and BNP are OK.  Mildly elevated LFT's, BP's 140-150's over 80/90.  Chest x-ray and CT scan of chest are OK.       Pt received her 1u PRBC and H/H up to 26.7/8.3.  Pt is with her  and does not feel any better.      She reports her headache started as a 'background' headache, but is now persistent.  They are worried about her elevated BP and urinary frequency.  She reports had swelling since delivery, but that is gone down.   Exam, no swelling or hyper-reflexia appreciated on exam.      She urinated in the ER 5 times, pt reports normal amounts, and she feels she has a UTI. She also has lower back pain that she correlates to her having a UTI.  I discussed that this does not seem to be a symptomatic UTI since she voiding a normal/large amount, and her back pain is quite low, and is probably due to uterine contractions.  Will keep track of her output for now.     BP's overnight was 120/80's, but went to 153/88 once awake. ER had mildly elevated LFT's. ER urine had 3+ leukocytes along with 3+,blood and 1+ protein.  Will do pre-e labs/urine today to determine if needs IV magnesium. Discussed getting clean catch urine, free of any vaginal blood.     I encouraged her to take a percocet for her headache, since both tylenol and motrin have not gotten rid of her headache, she will try that for now.    She is pumping during my exam since she is breastfeeding, and I told her she  can take percocet while pumping.     Pre-e labs labs returned with P/C ratio elevated at 0.48, platelet count wnl at 200K.   Shall transfer to L&D and do magnesium prophylaxis for up to 24hrs.        Scheduled Meds:   magnesium sulfate in water  4 g Intravenous Once     Continuous Infusions:   lactated ringers   Intravenous Continuous        magnesium sulfate in water  2 g/hr Intravenous Continuous         PRN Meds:  Current Facility-Administered Medications:     0.9%  NaCl infusion (for blood administration), , Intravenous, Q24H PRN    calcium gluconate, 1 g, Intravenous, PRN    hydrALAZINE, 10 mg, Intravenous, Once PRN    hydrALAZINE, 5 mg, Intravenous, Once PRN    ibuprofen, 800 mg, Oral, Q6H PRN    labetalol, 20 mg, Intravenous, Once PRN    labetalol, 40 mg, Intravenous, Once PRN    labetalol, 40 mg, Intravenous, Once PRN    labetalol, 80 mg, Intravenous, Once PRN    oxyCODONE-acetaminophen, 1 tablet, Oral, Q4H PRN    oxyCODONE-acetaminophen, 2 tablet, Oral, Q4H PRN    Review of patient's allergies indicates:  No Known Allergies    Objective:     Vital Signs (Most Recent):  Temp: 97.6 °F (36.4 °C) (12/31/24 1131)  Pulse: 96 (12/31/24 1131)  Resp: 18 (12/31/24 1131)  BP: (!) 146/89 (12/31/24 1131)  SpO2: 98 % (12/31/24 1131) Vital Signs (24h Range):  Temp:  [97.5 °F (36.4 °C)-98.4 °F (36.9 °C)] 97.6 °F (36.4 °C)  Pulse:  [] 96  Resp:  [16-22] 18  SpO2:  [97 %-99 %] 98 %  BP: (119-158)/() 146/89     Weight: 67.1 kg (148 lb)  Body mass index is 27.07 kg/m².  No LMP recorded.    I&O (Last 24H):    Intake/Output Summary (Last 24 hours) at 12/31/2024 1209  Last data filed at 12/31/2024 0505  Gross per 24 hour   Intake 1100.5 ml   Output --   Net 1100.5 ml                Assessment/Plan:     * Anemia  Post-partum pre-eclampsia  IV magnesium for up to 24hrs until diuresis/HA resolved  Anemia - better after 1uPRBC        Rocio Harry MD  Obstetrics & Gynecology  [facility]

## 2024-12-31 NOTE — H&P
[facility]  Obstetrics & Gynecology  History & Physical    Patient Name: Telma Greenberg  MRN: [id number]  Admission Date: 2024  Primary Care Provider: Caitlyn Hayward MD    Subjective:     Chief Complaint/Reason for Admission: palpitation, tingling, SOB    History of Present Illness:   35yo , had  , pp Hemorrhage that required D&C with transfusion of 1u PRBC.    Presents to ER with palpitations, tingling and SOB.  H/H 24.8/7.7, was 23.6/7.7 upon discharge.  She was orthostatic with VS, heart rate up to 120's.  EKG showed no acute abnormalities per ER doc's interpretation.  Troponin and BNP are OK.  Mildly elevated LFT's, BP's 140-150's over 80/90.  Chest x-ray and CT scan of chest are OK.    ER discussed with patient about getting a blood transfusion to help with her symptoms, and she agrees.    No current facility-administered medications on file prior to encounter.     Current Outpatient Medications on File Prior to Encounter   Medication Sig    docusate sodium (COLACE) 100 MG capsule Take 100 mg by mouth 2 (two) times daily.    ferrous sulfate 325 (65 FE) MG EC tablet Take 325 mg by mouth 3 (three) times daily with meals.    magnesium oxide (MAG-OX) 400 mg (241.3 mg magnesium) tablet Take 400 mg by mouth once daily.    prenatal vit/iron fum/folic ac (PRENATAL 1+1 ORAL) Take 1 capsule by mouth once daily.    ibuprofen (ADVIL,MOTRIN) 600 MG tablet Take 1 tablet (600 mg total) by mouth every 6 (six) hours as needed (cramping). (Patient not taking: Reported on 2024)    oxyCODONE-acetaminophen (PERCOCET) 5-325 mg per tablet Take 1-2 tablets by mouth every 4 (four) hours as needed for Pain. (Patient not taking: Reported on 2024)       Review of patient's allergies indicates:  No Known Allergies    Past Medical History:   Diagnosis Date    Abnormal Pap smear of cervix 2014    colpo done no bx bc was pregnant    Depression     1st pregnancy    HPV in female      Hyperlipidemia     Hypotension     Migraines     PFO (patent foramen ovale)      OB History    Para Term  AB Living   4 4 4 0 0 4   SAB IAB Ectopic Multiple Live Births   0 0   0 4      # Outcome Date GA Lbr [place]/2nd Weight Sex Type Anes PTL Lv   4 Term 24 39w1d 03:51 / 00:06 3.725 kg (8 lb 3.4 oz) M Vag-Spont None N HEBER   3 Term 10/11/16 39w3d  3.402 kg (7 lb 8 oz) M Vag-Spont EPI N HEBER   2 Term 01/05/15 40w0d  3.856 kg (8 lb 8 oz) M Vag-Spont   HEBER   1 Term 09 41w0d  3.799 kg (8 lb 6 oz) F Vag-Spont   HEBER      Obstetric Comments   Menarche 12     No past surgical history on file.  Family History       Problem Relation (Age of Onset)    Alcohol abuse Brother, Brother    Alzheimer's disease Paternal Grandmother    Breast cancer Maternal Grandmother    Lung cancer Father    No Known Problems Brother    Pneumonia Mother    Rectal cancer Maternal Grandmother    Stroke Brother          Tobacco Use    Smoking status: Never    Smokeless tobacco: Never   Substance and Sexual Activity    Alcohol use: No     Comment: former , minimal    Drug use: No    Sexual activity: Yes     Partners: Male     Review of Systems  Objective:     Vital Signs (Most Recent):  Temp: 97.8 °F (36.6 °C) (24)  Pulse: 94 (24)  Resp: 16 (24)  BP: (!) 153/88 (24)  SpO2: 98 % (24) Vital Signs (24h Range):  Temp:  [97.5 °F (36.4 °C)-98.4 °F (36.9 °C)] 97.8 °F (36.6 °C)  Pulse:  [] 94  Resp:  [16-22] 16  SpO2:  [97 %-99 %] 98 %  BP: (119-158)/() 153/88     Weight: 67.1 kg (148 lb)  Body mass index is 27.07 kg/m².  No LMP recorded.        Assessment/Plan:     Active Diagnoses:    Diagnosis Date Noted POA    PRINCIPAL PROBLEM:  Anemia [D64.9] 10/11/2016 Yes      Problems Resolved During this Admission:       Post-partum symptomatic anemia  Transfuse with 1uPRBC    Rocio Harry MD  Obstetrics & Gynecology  [facility]

## 2024-12-31 NOTE — SUBJECTIVE & OBJECTIVE
Scheduled Meds:   magnesium sulfate in water  4 g Intravenous Once     Continuous Infusions:   lactated ringers   Intravenous Continuous        magnesium sulfate in water  2 g/hr Intravenous Continuous         PRN Meds:  Current Facility-Administered Medications:     0.9%  NaCl infusion (for blood administration), , Intravenous, Q24H PRN    calcium gluconate, 1 g, Intravenous, PRN    hydrALAZINE, 10 mg, Intravenous, Once PRN    hydrALAZINE, 5 mg, Intravenous, Once PRN    ibuprofen, 800 mg, Oral, Q6H PRN    labetalol, 20 mg, Intravenous, Once PRN    labetalol, 40 mg, Intravenous, Once PRN    labetalol, 40 mg, Intravenous, Once PRN    labetalol, 80 mg, Intravenous, Once PRN    oxyCODONE-acetaminophen, 1 tablet, Oral, Q4H PRN    oxyCODONE-acetaminophen, 2 tablet, Oral, Q4H PRN    Review of patient's allergies indicates:  No Known Allergies    Objective:     Vital Signs (Most Recent):  Temp: 97.6 °F (36.4 °C) (12/31/24 1131)  Pulse: 96 (12/31/24 1131)  Resp: 18 (12/31/24 1131)  BP: (!) 146/89 (12/31/24 1131)  SpO2: 98 % (12/31/24 1131) Vital Signs (24h Range):  Temp:  [97.5 °F (36.4 °C)-98.4 °F (36.9 °C)] 97.6 °F (36.4 °C)  Pulse:  [] 96  Resp:  [16-22] 18  SpO2:  [97 %-99 %] 98 %  BP: (119-158)/() 146/89     Weight: 67.1 kg (148 lb)  Body mass index is 27.07 kg/m².  No LMP recorded.    I&O (Last 24H):    Intake/Output Summary (Last 24 hours) at 12/31/2024 1209  Last data filed at 12/31/2024 0505  Gross per 24 hour   Intake 1100.5 ml   Output --   Net 1100.5 ml

## 2024-12-31 NOTE — PLAN OF CARE
[facility]  Initial Discharge Assessment       Primary Care Provider: Caitlyn Hayward MD     completed discharge assessment with Pt and Bryant Jaime (spouse). Pt AAOx4s. Pt lives spouse. Demographics, PCP, and insurance verified. No home health. No dialysis. Pt completes ADLs without assistance. Pt to discharge home via family transport. Pt has no other needs to be addressed at this time.    Admission Diagnosis: Symptomatic anemia [D64.9]    Admission Date: 12/30/2024  Expected Discharge Date:     Transition of Care Barriers: None    Payor: [employer] / Plan: [employer] PPO / Product Type: PPO /     Extended Emergency Contact Information  Primary Emergency Contact: Bryant Jaime  Address: [address] [place] [address] [place]  Mobile Phone: [phone]  Relation: Significant other  Secondary Emergency Contact: Yue Del Castillo   [place]  Mobile Phone: [phone]  Relation: Other    Discharge Plan A: Home with family  Discharge Plan B: Home      [place] [address] [address] [employer] [address] [address]  Phone: [phone] Fax: [phone]      Initial Assessment (most recent)       Adult Discharge Assessment - 12/31/24 1518          Discharge Assessment    Assessment Type Discharge Planning Assessment     Confirmed/corrected address, phone number and insurance Yes     Confirmed Demographics Correct on Facesheet     Source of Information patient;family     When was your last doctors appointment? --   PCP seen 4 months ago    Does patient/caregiver understand observation status Yes     Communicated [place] with patient/caregiver Date not available/Unable to determine     Reason For Admission Anemia     People in Home spouse;child(ivon), dependent     Facility Arrived From: home     Do you expect to return to your current living situation? Yes     Do you have help at home or someone to help you  manage your care at home? Yes     Who are your caregiver(s) and their phone number(s)? Bryant Jaime/spouse [phone]     Prior to hospitilization cognitive status: Alert/Oriented     Current cognitive status: Alert/Oriented     Walking or Climbing Stairs Difficulty no     Dressing/Bathing Difficulty no     Home Accessibility not wheelchair accessible     Equipment Currently Used at Home none     Readmission within 30 days? Yes     Patient currently being followed by outpatient case management? No     Do you currently have service(s) that help you manage your care at home? No     Do you take prescription medications? Yes     Do you have prescription coverage? Yes     Coverage Payor: [employer] PPO -     Do you have any problems affording any of your prescribed medications? No     Is the patient taking medications as prescribed? yes     Who is going to help you get home at discharge? Bryant Jaime/spouse [phone]     How do you get to doctors appointments? family or friend will provide     Are you on dialysis? No     Do you take coumadin? No     Discharge Plan A Home with family     Discharge Plan B Home     DME Needed Upon Discharge  none     Discharge Plan discussed with: Patient;Spouse/sig other     Name(s) and Number(s) Bryant Jaime/spouse [phone]     Transition of Care Barriers None

## 2024-12-31 NOTE — HPI
33yo , had  , pp Hemorrhage that required D&C with transfusion of 1u PRBC.     Presents to ER with palpitations, tingling and SOB.  H/H 24.8/7.7, was 23.6/7.7 upon discharge.  She was orthostatic with VS, heart rate up to 120's.  EKG showed no acute abnormalities per ER doc's interpretation.  Troponin and BNP are OK.  Mildly elevated LFT's, BP's 140-150's over 80/90.  Chest x-ray and CT scan of chest are OK.       Pt received her 1u PRBC and H/H up to 26.7/8.3.  Pt is with her  and does not feel any better.      She reports her headache started as a 'background' headache, but is now persistent.  They are worried about her elevated BP and urinary frequency.  She reports had swelling since delivery, but that is gone down.   Exam, no swelling or hyper-reflexia appreciated on exam.      She urinated in the ER 5 times, pt reports normal amounts, and she feels she has a UTI. She also has lower back pain that she correlates to her having a UTI.  I discussed that this does not seem to be a symptomatic UTI since she voiding a normal/large amount, and her back pain is quite low, and is probably due to uterine contractions.  Will keep track of her output for now.     BP's overnight was 120/80's, but went to 153/88 once awake. ER had mildly elevated LFT's. ER urine had 3+ leukocytes along with 3+,blood and 1+ protein.  Will do pre-e labs/urine today to determine if needs IV magnesium. Discussed getting clean catch urine, free of any vaginal blood.     I encouraged her to take a percocet for her headache, since both tylenol and motrin have not gotten rid of her headache, she will try that for now.    She is pumping during my exam since she is breastfeeding, and I told her she can take percocet while pumping.     Pre-e labs labs returned with P/C ratio elevated at 0.48, platelet count wnl at 200K.   Shall transfer to L&D and do magnesium prophylaxis for up to 24hrs.

## 2024-12-31 NOTE — ASSESSMENT & PLAN NOTE
Post-partum pre-eclampsia  IV magnesium for up to 24hrs until diuresis/HA resolved  Anemia - better after 1uPRBC

## 2025-01-01 PROBLEM — E83.51 HYPOCALCEMIA: Status: ACTIVE | Noted: 2025-01-01

## 2025-01-01 PROBLEM — R51.9 HEADACHE: Status: ACTIVE | Noted: 2025-01-01

## 2025-01-01 LAB
BILIRUB UR QL STRIP: NEGATIVE
CLARITY UR: CLEAR
COLOR UR: COLORLESS
GLUCOSE UR QL STRIP: NEGATIVE
HGB UR QL STRIP: ABNORMAL
KETONES UR QL STRIP: NEGATIVE
LEUKOCYTE ESTERASE UR QL STRIP: NEGATIVE
NITRITE UR QL STRIP: NEGATIVE
PH UR STRIP: 7 [PH] (ref 5–8)
PROT UR QL STRIP: NEGATIVE
SP GR UR STRIP: 1.01 (ref 1–1.03)
URN SPEC COLLECT METH UR: ABNORMAL
UROBILINOGEN UR STRIP-ACNC: NEGATIVE EU/DL

## 2025-01-01 PROCEDURE — 81003 URINALYSIS AUTO W/O SCOPE: CPT | Performed by: OBSTETRICS & GYNECOLOGY

## 2025-01-01 PROCEDURE — 25000003 PHARM REV CODE 250

## 2025-01-01 PROCEDURE — 99232 SBSQ HOSP IP/OBS MODERATE 35: CPT | Mod: ,,, | Performed by: OBSTETRICS & GYNECOLOGY

## 2025-01-01 PROCEDURE — 25000003 PHARM REV CODE 250: Performed by: OBSTETRICS & GYNECOLOGY

## 2025-01-01 PROCEDURE — 63600175 PHARM REV CODE 636 W HCPCS: Performed by: OBSTETRICS & GYNECOLOGY

## 2025-01-01 PROCEDURE — 12000002 HC ACUTE/MED SURGE SEMI-PRIVATE ROOM

## 2025-01-01 RX ORDER — ACETAMINOPHEN 325 MG/1
650 TABLET ORAL EVERY 6 HOURS PRN
Status: DISCONTINUED | OUTPATIENT
Start: 2025-01-01 | End: 2025-01-02 | Stop reason: HOSPADM

## 2025-01-01 RX ORDER — OXYMETAZOLINE HCL 0.05 %
2 SPRAY, NON-AEROSOL (ML) NASAL 2 TIMES DAILY
Status: DISCONTINUED | OUTPATIENT
Start: 2025-01-01 | End: 2025-01-01

## 2025-01-01 RX ORDER — BUTALBITAL, ACETAMINOPHEN AND CAFFEINE 50; 325; 40 MG/1; MG/1; MG/1
1 TABLET ORAL EVERY 4 HOURS PRN
Status: DISCONTINUED | OUTPATIENT
Start: 2025-01-01 | End: 2025-01-02 | Stop reason: HOSPADM

## 2025-01-01 RX ORDER — SODIUM,POTASSIUM PHOSPHATES 280-250MG
2 POWDER IN PACKET (EA) ORAL
Status: DISCONTINUED | OUTPATIENT
Start: 2025-01-01 | End: 2025-01-02 | Stop reason: HOSPADM

## 2025-01-01 RX ORDER — LANOLIN ALCOHOL/MO/W.PET/CERES
800 CREAM (GRAM) TOPICAL
Status: DISCONTINUED | OUTPATIENT
Start: 2025-01-01 | End: 2025-01-02 | Stop reason: HOSPADM

## 2025-01-01 RX ORDER — PRENATAL WITH FERROUS FUM AND FOLIC ACID 3080; 920; 120; 400; 22; 1.84; 3; 20; 10; 1; 12; 200; 27; 25; 2 [IU]/1; [IU]/1; MG/1; [IU]/1; MG/1; MG/1; MG/1; MG/1; MG/1; MG/1; UG/1; MG/1; MG/1; MG/1; MG/1
1 TABLET ORAL DAILY
Status: DISCONTINUED | OUTPATIENT
Start: 2025-01-01 | End: 2025-01-02 | Stop reason: HOSPADM

## 2025-01-01 RX ORDER — HYDROCORTISONE 25 MG/G
CREAM TOPICAL 2 TIMES DAILY
Status: DISCONTINUED | OUTPATIENT
Start: 2025-01-01 | End: 2025-01-02 | Stop reason: HOSPADM

## 2025-01-01 RX ORDER — LANOLIN ALCOHOL/MO/W.PET/CERES
1 CREAM (GRAM) TOPICAL DAILY
Status: DISCONTINUED | OUTPATIENT
Start: 2025-01-01 | End: 2025-01-02 | Stop reason: HOSPADM

## 2025-01-01 RX ORDER — BISACODYL 5 MG
5 TABLET, DELAYED RELEASE (ENTERIC COATED) ORAL DAILY PRN
Status: DISCONTINUED | OUTPATIENT
Start: 2025-01-01 | End: 2025-01-02 | Stop reason: HOSPADM

## 2025-01-01 RX ORDER — OXYMETAZOLINE HCL 0.05 %
2 SPRAY, NON-AEROSOL (ML) NASAL 2 TIMES DAILY PRN
Status: DISCONTINUED | OUTPATIENT
Start: 2025-01-01 | End: 2025-01-02 | Stop reason: HOSPADM

## 2025-01-01 RX ADMIN — SODIUM CHLORIDE, POTASSIUM CHLORIDE, SODIUM LACTATE AND CALCIUM CHLORIDE: 600; 310; 30; 20 INJECTION, SOLUTION INTRAVENOUS at 01:01

## 2025-01-01 RX ADMIN — PRENATAL VIT W/ FE FUMARATE-FA TAB 27-0.8 MG 1 TABLET: 27-0.8 TAB at 03:01

## 2025-01-01 RX ADMIN — HYDROCORTISONE 2.5%: 25 CREAM TOPICAL at 09:01

## 2025-01-01 RX ADMIN — IBUPROFEN 800 MG: 400 TABLET, FILM COATED ORAL at 07:01

## 2025-01-01 RX ADMIN — BUTALBITAL, ACETAMINOPHEN, AND CAFFEINE 1 TABLET: 50; 325; 40 TABLET, COATED ORAL at 03:01

## 2025-01-01 RX ADMIN — ACETAMINOPHEN 650 MG: 325 TABLET ORAL at 01:01

## 2025-01-01 RX ADMIN — BISACODYL 5 MG: 5 TABLET, COATED ORAL at 09:01

## 2025-01-01 RX ADMIN — MAGNESIUM SULFATE HEPTAHYDRATE 2 G/HR: 40 INJECTION, SOLUTION INTRAVENOUS at 07:01

## 2025-01-01 RX ADMIN — OXYCODONE HYDROCHLORIDE AND ACETAMINOPHEN 1 TABLET: 5; 325 TABLET ORAL at 02:01

## 2025-01-01 RX ADMIN — BUTALBITAL, ACETAMINOPHEN, AND CAFFEINE 1 TABLET: 50; 325; 40 TABLET, COATED ORAL at 07:01

## 2025-01-01 RX ADMIN — FERROUS SULFATE TAB 325 MG (65 MG ELEMENTAL FE) 1 EACH: 325 (65 FE) TAB at 03:01

## 2025-01-01 RX ADMIN — IBUPROFEN 800 MG: 400 TABLET, FILM COATED ORAL at 01:01

## 2025-01-01 RX ADMIN — Medication 2 SPRAY: at 02:01

## 2025-01-01 NOTE — PROGRESS NOTES
[facility]  Progress Note    Patient Name: Telma Greenberg  MRN: [id number]  Patient Class: IP- Inpatient   Admission Date: 12/30/2024  Length of Stay: 1 days  Attending Physician: Rocio [place] MD  Primary Care Provider: Caitlyn Hayward MD        Subjective     Principal Problem:Anemia        HPI:  Patient is a 34-year-old female who is status post vaginal delivery on 12/26/2024, complicated by postpartum hemorrhage requiring D and C with blood transfusion, presented with complaints of palpitations, shortness of breath and tingling in the extremities.  She received transfusion of 2nd unit PRBC, chest CT was done that was negative for PE.  During the hospital course, patient noted to have headaches with some elevated blood pressure and urinary frequency for which she was started on magnesium prophylaxis for possible postpartum preeclampsia.  But given that patient is still had continued headaches, Hospital Medicine was consulted.      At the time of my exam, she mentioned her headache is more in the left occipital region and in the left temporal region, that did improve transiently with Tylenol and Percocet but it returns back frequently.  No ophthalmoplegia, no symptoms of ear fullness, no tinnitus, no tenderness in the temporal region.  No vision changes, no hearing changes, no numbness or weakness.  But she does complain she has been having tingling in bilateral hands since her 3rd trimester, and was told likely associated with the edema during pregnancy.    She does have a history of migraine, but she mentions these headaches are new from her usual migraine attacks.    On exam, neurologically no signs noted.  Full strength, full sensations, cranial nerves intact.    She had minimal leukocytosis with white count at 12.9 during presentation, postpartum anemia with hemoglobin 7.7 at presentation, mildly elevated LFTs with AST at 96, ALT 61, BNP slightly high at 129, troponin  slightly high at 22.1 which downtrended to 19.6.  TSH normal at 1.8.  UA was negative.  Urine analysis showed urine protein at 18, protein/creatinine ratio elevated at 0.48.  Labs from yesterday showed normal white count, hemoglobin improved to 8.8, LFTs downtrended.  Calcium low at 7.7.  Chest x-ray negative.    Overview/Hospital Course:  No notes on file    Past Medical History:   Diagnosis Date    Abnormal Pap smear of cervix 06/2014    colpo done no bx bc was pregnant    Depression     1st pregnancy    HPV in female     Hyperlipidemia     Hypotension     Migraines     PFO (patent foramen ovale) 2023       No past surgical history on file.    Review of patient's allergies indicates:  No Known Allergies    No current facility-administered medications on file prior to encounter.     Current Outpatient Medications on File Prior to Encounter   Medication Sig    docusate sodium (COLACE) 100 MG capsule Take 100 mg by mouth 2 (two) times daily.    ferrous sulfate 325 (65 FE) MG EC tablet Take 325 mg by mouth 3 (three) times daily with meals.    magnesium oxide (MAG-OX) 400 mg (241.3 mg magnesium) tablet Take 400 mg by mouth once daily.    prenatal vit/iron fum/folic ac (PRENATAL 1+1 ORAL) Take 1 capsule by mouth once daily.    ibuprofen (ADVIL,MOTRIN) 600 MG tablet Take 1 tablet (600 mg total) by mouth every 6 (six) hours as needed (cramping). (Patient not taking: Reported on 12/30/2024)    oxyCODONE-acetaminophen (PERCOCET) 5-325 mg per tablet Take 1-2 tablets by mouth every 4 (four) hours as needed for Pain. (Patient not taking: Reported on 12/30/2024)     Family History       Problem Relation (Age of Onset)    Alcohol abuse Brother, Brother    Alzheimer's disease Paternal Grandmother    Breast cancer Maternal Grandmother    Lung cancer Father    No Known Problems Brother    Pneumonia Mother    Rectal cancer Maternal Grandmother    Stroke Brother          Tobacco Use    Smoking status: Never    Smokeless tobacco:  Never   Substance and Sexual Activity    Alcohol use: No     Comment: former , minimal    Drug use: No    Sexual activity: Yes     Partners: Male     Review of Systems   Constitutional:  Positive for fatigue. Negative for chills and fever.   HENT:  Positive for nosebleeds. Negative for congestion and facial swelling.    Eyes:  Negative for photophobia, pain and visual disturbance.   Respiratory:  Negative for cough, chest tightness and shortness of breath.    Cardiovascular:  Negative for chest pain, palpitations and leg swelling.   Gastrointestinal:  Negative for abdominal pain, nausea and vomiting.   Genitourinary:  Negative for dysuria.   Skin:  Positive for pallor.   Neurological:  Positive for headaches. Negative for dizziness, tremors, seizures, syncope, facial asymmetry, speech difficulty, weakness, light-headedness and numbness.        Tingling at fingertips, since last 3 once months   Psychiatric/Behavioral:  Negative for behavioral problems and confusion.      Objective:     Vital Signs (Most Recent):  Temp: 98.1 °F (36.7 °C) (01/01/25 0709)  Pulse: 77 (01/01/25 1037)  Resp: 17 (01/01/25 0709)  BP: 120/73 (01/01/25 0957)  SpO2: 97 % (01/01/25 1037) Vital Signs (24h Range):  Temp:  [97.8 °F (36.6 °C)-98.1 °F (36.7 °C)] 98.1 °F (36.7 °C)  Pulse:  [] 77  Resp:  [16-17] 17  SpO2:  [94 %-100 %] 97 %  BP: (107-139)/(64-87) 120/73     Weight: 67.1 kg (148 lb)  Body mass index is 27.07 kg/m².     Physical Exam  Constitutional:       General: She is not in acute distress.     Appearance: Normal appearance. She is not ill-appearing.   HENT:      Head: Normocephalic and atraumatic.      Nose: Nose normal.      Mouth/Throat:      Mouth: Mucous membranes are moist.   Eyes:      Extraocular Movements: Extraocular movements intact.      Pupils: Pupils are equal, round, and reactive to light.   Cardiovascular:      Rate and Rhythm: Normal rate and regular rhythm.      Pulses: Normal pulses.   Pulmonary:      Effort:  Pulmonary effort is normal. No respiratory distress.      Breath sounds: Normal breath sounds. No wheezing.   Abdominal:      General: Abdomen is flat. There is no distension.      Palpations: Abdomen is soft.      Tenderness: There is no abdominal tenderness.   Musculoskeletal:         General: No swelling or deformity.      Cervical back: No rigidity or tenderness.   Skin:     General: Skin is warm.      Coloration: Skin is pale.      Findings: No erythema or rash.   Neurological:      General: No focal deficit present.      Mental Status: She is alert and oriented to person, place, and time. Mental status is at baseline.      Cranial Nerves: No cranial nerve deficit.      Sensory: No sensory deficit.      Motor: No weakness.      Coordination: Coordination normal.   Psychiatric:         Mood and Affect: Mood normal.         Behavior: Behavior normal.          Significant Labs: All pertinent labs within the past 24 hours have been reviewed.    Significant Imaging: I have reviewed all pertinent imaging results/findings within the past 24 hours.    Assessment and Plan     * Anemia  Anemia is likely due to acute blood loss which was from postpartum hemorrhage and   . Most recent hemoglobin and hematocrit are listed below.  Recent Labs     12/30/24  1511 12/31/24  0755 12/31/24  1122   HGB 7.7* 8.3* 8.8*   HCT 24.8* 26.7* 27.5*     Plan  - Monitor serial CBC: Daily  - Transfuse PRBC if patient becomes hemodynamically unstable, symptomatic or H/H drops below 7/21.  - Patient has received 2 units of PRBCs on    - Patient's anemia is currently improving  - obtain iron panel      Hypocalcemia  The most recent calcium and albumin results listed below.  Recent Labs     12/30/24  1511 12/31/24  1122   CALCIUM 8.3* 7.7*   ALBUMIN 3.3* 3.1*     Plan  - Will replace calcium and monitor electrolytes closely.  - The patient's hypocalcemia is worsening. Will adjust treatment as follows:  Replacement  - corrected calcium at 8.4  -  repeat labs, repeat albumin level      Headache  - neurologically no signs on exam  - Fioricet p.r.n.  - discussed with OBGYN, it is okay during lactation  - CT brain without contrast  - if headache not improving with Fioricet, Tylenol, optimal blood pressure control and preeclampsia treatment:  Consider MRI brain, neurology consult  - patient has family history of stroke-obtain A1c, lipid panel for risk stratification      Pre-eclampsia in postpartum period  - was on magnesium sulfate  - management as per OBGYN  - blood pressure control        VTE Risk Mitigation (From admission, onward)      None            Discharge Planning   [place]:      Code Status: Prior   Medical Readiness for Discharge Date:   Discharge Plan A: Home with family                        Fadia Menendez MD  Department of Hospital Medicine   [facility]

## 2025-01-01 NOTE — ASSESSMENT & PLAN NOTE
Anemia is likely due to acute blood loss which was from postpartum hemorrhage and   . Most recent hemoglobin and hematocrit are listed below.  Recent Labs     12/30/24  1511 12/31/24  0755 12/31/24  1122   HGB 7.7* 8.3* 8.8*   HCT 24.8* 26.7* 27.5*     Plan  - Monitor serial CBC: Daily  - Transfuse PRBC if patient becomes hemodynamically unstable, symptomatic or H/H drops below 7/21.  - Patient has received 2 units of PRBCs on    - Patient's anemia is currently improving  - obtain iron panel

## 2025-01-01 NOTE — PROGRESS NOTES
The patient is now status post magnesium.    Urinary output is significantly improved.  Urinalysis was negative.    Patient has initiated Fioricet for headache which has now decreased to a four.    Internal medicine has seen and evaluated the patient and CT scan of the head was negative.      The above was reviewed discussed with the patient.    At this time we will continue with conservative management with the patient off magnesium on a regular diet and taking appropriate medications for headache.  In the event that headache persists or worsens we will proceed with MRI of the brain     The patient and family's questions regarding the above were answered and they are in agreement with the current plan.      Ibrahima Alexandra MD  OBGYN [facility]

## 2025-01-01 NOTE — HPI
Patient is a 34-year-old female who is status post vaginal delivery on 12/26/2024, complicated by postpartum hemorrhage requiring D and C with blood transfusion, presented with complaints of palpitations, shortness of breath and tingling in the extremities.  She received transfusion of 2nd unit PRBC, chest CT was done that was negative for PE.  During the hospital course, patient noted to have headaches with some elevated blood pressure and urinary frequency for which she was started on magnesium prophylaxis for possible postpartum preeclampsia.  But given that patient is still had continued headaches, Hospital Medicine was consulted.      At the time of my exam, she mentioned her headache is more in the left occipital region and in the left temporal region, that did improve transiently with Tylenol and Percocet but it returns back frequently.  No ophthalmoplegia, no symptoms of ear fullness, no tinnitus, no tenderness in the temporal region.  No vision changes, no hearing changes, no numbness or weakness.  But she does complain she has been having tingling in bilateral hands since her 3rd trimester, and was told likely associated with the edema during pregnancy.    She does have a history of migraine, but she mentions these headaches are new from her usual migraine attacks.    On exam, neurologically no signs noted.  Full strength, full sensations, cranial nerves intact.    She had minimal leukocytosis with white count at 12.9 during presentation, postpartum anemia with hemoglobin 7.7 at presentation, mildly elevated LFTs with AST at 96, ALT 61, BNP slightly high at 129, troponin slightly high at 22.1 which downtrended to 19.6.  TSH normal at 1.8.  UA was negative.  Urine analysis showed urine protein at 18, protein/creatinine ratio elevated at 0.48.  Labs from yesterday showed normal white count, hemoglobin improved to 8.8, LFTs downtrended.  Calcium low at 7.7.  Chest x-ray negative.

## 2025-01-01 NOTE — ASSESSMENT & PLAN NOTE
The most recent calcium and albumin results listed below.  Recent Labs     12/30/24  1511 12/31/24  1122   CALCIUM 8.3* 7.7*   ALBUMIN 3.3* 3.1*     Plan  - Will replace calcium and monitor electrolytes closely.  - The patient's hypocalcemia is worsening. Will adjust treatment as follows:  Replacement  - order IV calcium based on repeat BMP today

## 2025-01-01 NOTE — SUBJECTIVE & OBJECTIVE
Past Medical History:   Diagnosis Date    Abnormal Pap smear of cervix 06/2014    colpo done no bx bc was pregnant    Depression     1st pregnancy    HPV in female     Hyperlipidemia     Hypotension     Migraines     PFO (patent foramen ovale) 2023       No past surgical history on file.    Review of patient's allergies indicates:  No Known Allergies    No current facility-administered medications on file prior to encounter.     Current Outpatient Medications on File Prior to Encounter   Medication Sig    docusate sodium (COLACE) 100 MG capsule Take 100 mg by mouth 2 (two) times daily.    ferrous sulfate 325 (65 FE) MG EC tablet Take 325 mg by mouth 3 (three) times daily with meals.    magnesium oxide (MAG-OX) 400 mg (241.3 mg magnesium) tablet Take 400 mg by mouth once daily.    prenatal vit/iron fum/folic ac (PRENATAL 1+1 ORAL) Take 1 capsule by mouth once daily.    ibuprofen (ADVIL,MOTRIN) 600 MG tablet Take 1 tablet (600 mg total) by mouth every 6 (six) hours as needed (cramping). (Patient not taking: Reported on 12/30/2024)    oxyCODONE-acetaminophen (PERCOCET) 5-325 mg per tablet Take 1-2 tablets by mouth every 4 (four) hours as needed for Pain. (Patient not taking: Reported on 12/30/2024)     Family History       Problem Relation (Age of Onset)    Alcohol abuse Brother, Brother    Alzheimer's disease Paternal Grandmother    Breast cancer Maternal Grandmother    Lung cancer Father    No Known Problems Brother    Pneumonia Mother    Rectal cancer Maternal Grandmother    Stroke Brother          Tobacco Use    Smoking status: Never    Smokeless tobacco: Never   Substance and Sexual Activity    Alcohol use: No     Comment: former , minimal    Drug use: No    Sexual activity: Yes     Partners: Male     Review of Systems   Constitutional:  Positive for fatigue. Negative for chills and fever.   HENT:  Positive for nosebleeds. Negative for congestion and facial swelling.    Eyes:  Negative for photophobia, pain and  visual disturbance.   Respiratory:  Negative for cough, chest tightness and shortness of breath.    Cardiovascular:  Negative for chest pain, palpitations and leg swelling.   Gastrointestinal:  Negative for abdominal pain, nausea and vomiting.   Genitourinary:  Negative for dysuria.   Skin:  Positive for pallor.   Neurological:  Positive for headaches. Negative for dizziness, tremors, seizures, syncope, facial asymmetry, speech difficulty, weakness, light-headedness and numbness.        Tingling at fingertips, since last 3 once months   Psychiatric/Behavioral:  Negative for behavioral problems and confusion.      Objective:     Vital Signs (Most Recent):  Temp: 98.1 °F (36.7 °C) (01/01/25 0709)  Pulse: 77 (01/01/25 1037)  Resp: 17 (01/01/25 0709)  BP: 120/73 (01/01/25 0957)  SpO2: 97 % (01/01/25 1037) Vital Signs (24h Range):  Temp:  [97.8 °F (36.6 °C)-98.1 °F (36.7 °C)] 98.1 °F (36.7 °C)  Pulse:  [] 77  Resp:  [16-17] 17  SpO2:  [94 %-100 %] 97 %  BP: (107-139)/(64-87) 120/73     Weight: 67.1 kg (148 lb)  Body mass index is 27.07 kg/m².     Physical Exam  Constitutional:       General: She is not in acute distress.     Appearance: Normal appearance. She is not ill-appearing.   HENT:      Head: Normocephalic and atraumatic.      Nose: Nose normal.      Mouth/Throat:      Mouth: Mucous membranes are moist.   Eyes:      Extraocular Movements: Extraocular movements intact.      Pupils: Pupils are equal, round, and reactive to light.   Cardiovascular:      Rate and Rhythm: Normal rate and regular rhythm.      Pulses: Normal pulses.   Pulmonary:      Effort: Pulmonary effort is normal. No respiratory distress.      Breath sounds: Normal breath sounds. No wheezing.   Abdominal:      General: Abdomen is flat. There is no distension.      Palpations: Abdomen is soft.      Tenderness: There is no abdominal tenderness.   Musculoskeletal:         General: No swelling or deformity.      Cervical back: No rigidity or  tenderness.   Skin:     General: Skin is warm.      Coloration: Skin is pale.      Findings: No erythema or rash.   Neurological:      General: No focal deficit present.      Mental Status: She is alert and oriented to person, place, and time. Mental status is at baseline.      Cranial Nerves: No cranial nerve deficit.      Sensory: No sensory deficit.      Motor: No weakness.      Coordination: Coordination normal.   Psychiatric:         Mood and Affect: Mood normal.         Behavior: Behavior normal.          Significant Labs: All pertinent labs within the past 24 hours have been reviewed.    Significant Imaging: I have reviewed all pertinent imaging results/findings within the past 24 hours.

## 2025-01-01 NOTE — ASSESSMENT & PLAN NOTE
- neurologically no signs on exam  - Fioricet p.r.n.  - discussed with OBGYN, it is okay during lactation  - CT brain without contrast  - if headache not improving with Fioricet, Tylenol, optimal blood pressure control and preeclampsia treatment:  Consider MRI brain, neurology consult

## 2025-01-01 NOTE — PROGRESS NOTES
[facility]  Obstetrics Postpartum Note    Admit Date: 12/30/2024    Today's Date: 01/01/2025    Hospital Note (Postpartum Day # 6) / Readmit Day #  2    The patient is status post a vaginal delivery on 12/26/2024 (Dr Marisol Marquez).   Her delivery was complicated by postpartum hemorrhage requiring D and C with blood transfusion.  On Monday 12/30/2024 the patient was readmitted after presenting to the emergency room with palpitations shortness of breath and tingling in the extremities.  She was evaluated by the emergency room and subsequently admitted.    She received a further transfusion.  Following admission the patient noted the onset of a headache which persisted and was found to be associated with some elevated blood pressures and urinary frequency.    She was transferred to labor and delivery and has been on magnesium prophylaxis.      At this time the patient continues to note issues with headaches.  Additional issues include low back pain, tingling in her hands and dysuria urinary with the additional urinary issues.    Past Medical History:   Diagnosis Date    Abnormal Pap smear of cervix 06/2014    colpo done no bx bc was pregnant    Depression     1st pregnancy    HPV in female     Hyperlipidemia     Hypotension     Migraines     PFO (patent foramen ovale) 2023      No past surgical history on file.     REVIEW OF SYSTEMS:     Review of Systems   Constitutional:  Negative for fever.   Eyes:  Negative for blurred vision.   Respiratory:  Negative for shortness of breath.    Cardiovascular:  Negative for chest pain.   Gastrointestinal:  Negative for abdominal pain and heartburn.   Genitourinary:  Negative for dysuria.   Neurological:  Positive for tingling and headaches.        OBJECTIVE:     Vital Signs  Vitals:    01/01/25 1037   BP:    Pulse: 77   Resp:    Temp:      Blood pressure: 120/73 with recent blood pressures all within acceptable range.    Physical Exam  Constitutional:        Appearance: Normal appearance.   HENT:      Head: Normocephalic.   Cardiovascular:      Rate and Rhythm: Normal rate.   Pulmonary:      Effort: Pulmonary effort is normal.   Neurological:      General: No focal deficit present.      Mental Status: She is alert.      Deep Tendon Reflexes: Reflexes are normal and symmetric.   Skin:     General: Skin is warm and dry.   Psychiatric:         Mood and Affect: Mood normal.        Hemoglobin/Hematocrit  Recent Labs   Lab 12/31/24  1122   HGB 8.8*   HCT 27.5*     ABO/Rh  Lab Results   Component Value Date    GROUPTRH O POS 12/30/2024     Results    Collected Updated Procedure    12/31/2024 1122 12/31/2024 1134 CBC Without Differential [[id number]]   (Abnormal)   Blood    Component Value Units   WBC 11.23 K/uL   RBC 3.31 Low  M/uL   Hemoglobin 8.8 Low  g/dL   Hematocrit 27.5 Low  %   MCV 83 fL   MCH 26.6 Low  pg   MCHC 32.0 g/dL   RDW 19.5 High  %   Platelets 200 K/uL   MPV 9.3 fL          12/31/2024 1122 12/31/2024 1207 Comprehensive metabolic panel [[id number]]   (Abnormal)   Blood    Component Value Units   Sodium 139 mmol/L   Potassium 4.3 mmol/L   Chloride 109 mmol/L   CO2 23 mmol/L   Glucose 122 High  mg/dL   BUN 8 mg/dL   Creatinine 0.7 mg/dL   Calcium 7.7 Low  mg/dL   Total Protein 5.4 Low  g/dL   Albumin 3.1 Low  g/dL   Total Bilirubin 0.4  mg/dL   Alkaline Phosphatase 99 U/L   AST 46 High  U/L   ALT 43 U/L   eGFR >60.0 mL/min/1.73 m^2   Anion Gap 7 Low  mmol/L          12/31/2024 1043 12/31/2024 1156 Protein / creatinine ratio, urine [[id number]]    (Abnormal)   Urine, Clean Catch    Component Value Units   Protein, Urine Random 18 High   mg/dL   Creatinine, Urine 37.3  mg/dL   Prot/Creat Ratio, Urine 0.48 High                  ASSESSMENT/PLAN:     S/P Vaginal Delivery postpartum day 6.  Postpartum preeclampsia  Persistent headache  Postpartum blood loss anemia    Plan:    The patient was seen evaluated her hospital room.    The above was reviewed discussed.       The patient's headache was discussed as this has her primary issue at this time.  She does report a history of migraine headaches but is unclear as to her previous treatments.  We discussed the fact that her headache is somewhat unusual blood pressure has been within normal limits and at times magnesium can assist in headaches.  She has been unresponsive to additional pain medicines.  In light of these issues a consult to Hospital Medicine is being placed for further evaluation of her neurologic issues.    In light of her urinary issues repeat urinalysis and urine culture has been ordered   We will discontinue the IV magnesium at 12:00 p.m., 130 today  We will base further evaluation treatment on the patient's status over time and results of hospital consultation.    The patient's questions regarding the above were answered and she is in agreement with the current plan.    Ibrahima Alexandra MD  Department OBGYN [facility]

## 2025-01-02 VITALS
TEMPERATURE: 99 F | WEIGHT: 148 LBS | OXYGEN SATURATION: 97 % | RESPIRATION RATE: 18 BRPM | HEART RATE: 83 BPM | BODY MASS INDEX: 27.23 KG/M2 | HEIGHT: 62 IN | SYSTOLIC BLOOD PRESSURE: 156 MMHG | DIASTOLIC BLOOD PRESSURE: 90 MMHG

## 2025-01-02 PROBLEM — R20.2 TINGLING OF BOTH UPPER EXTREMITIES: Status: ACTIVE | Noted: 2025-01-02

## 2025-01-02 PROBLEM — D72.829 LEUKOCYTOSIS: Status: ACTIVE | Noted: 2025-01-02

## 2025-01-02 PROBLEM — E78.00 HYPERCHOLESTEROLEMIA: Status: ACTIVE | Noted: 2025-01-02

## 2025-01-02 LAB
ANION GAP SERPL CALC-SCNC: 5 MMOL/L (ref 8–16)
BACTERIA UR CULT: NO GROWTH
BUN SERPL-MCNC: 13 MG/DL (ref 6–20)
CALCIUM SERPL-MCNC: 7.5 MG/DL (ref 8.7–10.5)
CHLORIDE SERPL-SCNC: 107 MMOL/L (ref 95–110)
CHOLEST SERPL-MCNC: 294 MG/DL (ref 120–199)
CHOLEST/HDLC SERPL: 4.7 {RATIO} (ref 2–5)
CO2 SERPL-SCNC: 27 MMOL/L (ref 23–29)
CREAT SERPL-MCNC: 0.7 MG/DL (ref 0.5–1.4)
ERYTHROCYTE [DISTWIDTH] IN BLOOD BY AUTOMATED COUNT: 19.3 % (ref 11.5–14.5)
EST. GFR  (NO RACE VARIABLE): >60 ML/MIN/1.73 M^2
ESTIMATED AVG GLUCOSE: 97 MG/DL (ref 68–131)
FERRITIN SERPL-MCNC: 17.4 NG/ML (ref 20–300)
GLUCOSE SERPL-MCNC: 81 MG/DL (ref 70–110)
HBA1C MFR BLD: 5 % (ref 4.5–6.2)
HCT VFR BLD AUTO: 30.7 % (ref 37–48.5)
HDLC SERPL-MCNC: 63 MG/DL (ref 40–75)
HDLC SERPL: 21.4 % (ref 20–50)
HGB BLD-MCNC: 9.8 G/DL (ref 12–16)
IRON SERPL-MCNC: 38 UG/DL (ref 30–160)
LDLC SERPL CALC-MCNC: 194.2 MG/DL (ref 63–159)
MAGNESIUM SERPL-MCNC: 2.9 MG/DL (ref 1.6–2.6)
MCH RBC QN AUTO: 27.1 PG (ref 27–31)
MCHC RBC AUTO-ENTMCNC: 31.9 G/DL (ref 32–36)
MCV RBC AUTO: 85 FL (ref 82–98)
NONHDLC SERPL-MCNC: 231 MG/DL
PHOSPHATE SERPL-MCNC: 3.6 MG/DL (ref 2.7–4.5)
PLATELET # BLD AUTO: 266 K/UL (ref 150–450)
PMV BLD AUTO: 9.2 FL (ref 9.2–12.9)
POTASSIUM SERPL-SCNC: 4.4 MMOL/L (ref 3.5–5.1)
RBC # BLD AUTO: 3.62 M/UL (ref 4–5.4)
SATURATED IRON: 8 % (ref 20–50)
SODIUM SERPL-SCNC: 139 MMOL/L (ref 136–145)
TOTAL IRON BINDING CAPACITY: 489 UG/DL (ref 250–450)
TRANSFERRIN SERPL-MCNC: 349 MG/DL (ref 200–375)
TRIGL SERPL-MCNC: 184 MG/DL (ref 30–150)
VIT B12 SERPL-MCNC: 307 PG/ML (ref 210–950)
WBC # BLD AUTO: 15.59 K/UL (ref 3.9–12.7)

## 2025-01-02 PROCEDURE — 82607 VITAMIN B-12: CPT

## 2025-01-02 PROCEDURE — 85027 COMPLETE CBC AUTOMATED: CPT

## 2025-01-02 PROCEDURE — 84466 ASSAY OF TRANSFERRIN: CPT

## 2025-01-02 PROCEDURE — 25000003 PHARM REV CODE 250: Performed by: OBSTETRICS & GYNECOLOGY

## 2025-01-02 PROCEDURE — 83735 ASSAY OF MAGNESIUM: CPT

## 2025-01-02 PROCEDURE — 83036 HEMOGLOBIN GLYCOSYLATED A1C: CPT

## 2025-01-02 PROCEDURE — 82728 ASSAY OF FERRITIN: CPT

## 2025-01-02 PROCEDURE — 80061 LIPID PANEL: CPT

## 2025-01-02 PROCEDURE — 80048 BASIC METABOLIC PNL TOTAL CA: CPT

## 2025-01-02 PROCEDURE — 84100 ASSAY OF PHOSPHORUS: CPT

## 2025-01-02 RX ADMIN — FERROUS SULFATE TAB 325 MG (65 MG ELEMENTAL FE) 1 EACH: 325 (65 FE) TAB at 09:01

## 2025-01-02 RX ADMIN — PRENATAL VIT W/ FE FUMARATE-FA TAB 27-0.8 MG 1 TABLET: 27-0.8 TAB at 09:01

## 2025-01-02 NOTE — NURSING
Contacted Dr. Alexandra, reported patient complaints of pre existing hemorrhoid discomfort and no BM x4 days.  New orders rcv'd. Pt up to shower, bed linens changed.    2330 Pt feels relief of headache with PRN medication.     0530 Pt has rested well off and on this shift, in between infant care.  Denies headache.

## 2025-01-02 NOTE — PLAN OF CARE
Problem: Hypertensive Disorders in Pregnancy  Goal: Patient-Fetal Stabilization  Intervention: Optimize Blood Pressure and Fluid Status  Flowsheets (Taken 1/1/2025 2000)  Fluid/Electrolyte Management: fluids provided     Problem: Hypertensive Disorders in Pregnancy  Goal: Patient-Fetal Stabilization  Intervention: Monitor and Manage Symptom Progression  Flowsheets (Taken 1/1/2025 2000)  Seizure Precautions:   clutter-free environment maintained   emergency equipment at bedside  Medication Review/Management: medications reviewed

## 2025-01-02 NOTE — PLAN OF CARE
01/02/25 1535   Final Note   Assessment Type Final Discharge Note   Anticipated Discharge Disposition Home   What phone number can be called within the next 1-3 days to see how you are doing after discharge? [phone]   Post-Acute Status   Discharge Delays None known at this time     Discharge orders and chart reviewed with no further post-acute discharge needs identified at this time.  At this time, patient is cleared for discharge from Case Management standpoint.

## 2025-01-02 NOTE — DISCHARGE INSTRUCTIONS
Repeat lipid panel in 4 weeks after diet and exercise, follow up with Dr Hickey, cardiologist as outpatient, TUMS 500mg daily for next 5 days, repeat CBC, CMP in 7 days after discharge and follow with PCP.    Discharge Instructions, [facility]    Thank you for choosing [facility] for your medical care. The primary doctor who is taking care of you at the time of your discharge is Marisol Marquez MD.     You were admitted to the hospital with Anemia.     Please note your discharge instructions, including diet/activity restrictions, follow-up appointments, and medication changes.  If you have any questions about your medical issues, prescriptions, or any other questions, please feel free to contact the [facility] Medicine Dept at [phone] and we will help.    If you are previously with Home health, outpatient PT/OT or under a therapy program, you are cleared to return to those programs.    Please direct all long term medication refills and follow up to your primary care provider, Caitlyn Hayward MD. Thank you again for letting us take care of your health care needs.    Please note the following discharge instructions per your discharging physician-    Follow up with your cardiologist with repeat lipid panel in 4-6 weeks    Diet and exercise instructions as attached    For any other worsening signs or symptoms, please call your doctor or go to the emergency department    Pelvic rest for 6 weeks (no sex, tampons, douching, nothing in the vagina)    You can experience vaginal bleeding on and off for up to 6 weeks, it will gradually get lighter and the color will change from bright red to a brownish discharge towards the end.    Activity:  NO strenuous activities or exercising.  Do not /lift anything over 15 pounds.   Do not do heavy housework or cleaning.    NO driving for 3 days.  You may take short car trips but do not drive.    You may shower and/or  soak in a bathtub, both are acceptable.  Use a mild soap, no heavy perfumes or fragrances to avoid irritation.     If constipation develops:  You may take Colace (stool softener), Milk of Magnesia, Dulcolax or Miralax.  All of these medications are sold over the counter.      Care of Episiotomy:  Local agents such as Tucks pads & Dermoplast spray.  You may also use a Sitz bath: sitting in a tub of warm water for 15 minutes 2-3 times per day will help relieve the discomfort.      Pain Relief:  You may take Motrin for mild pain & uterine cramping.      Emotional Changes:  You may experience baby blues after delivery.  You may feel let down, anxious and cry easily.  This is normal.  These feelings can begin 2-3 days after delivery and usually disappear in about a week or two.  Prolonged sadness may indicate postpartum depression.      Call your doctor for any of the following:  Difficulty breathing, problems with any of your medications, inability to eat.    Foul smelling vaginal discharge.  Temperature above 100.4.    Heavy vaginal bleeding.  All women bleed different after delivery and each delivery is different.  Heavy bleeding consists of saturating a joe pad in a 1 hour time period.  Passing clots are normal, if you pass a blood clot larger than the size of a golf ball call your doctor's office.   If you experience pain in your legs/calves, if one leg increases in size and becomes swollen or becomes hot to touch or discolored.   Crying or periods of sadness beyond 2 weeks.      If you are breast feeding:  Wash your breasts with mild soap and warm water.  You should wear a supportive bra.  You should continue to take a prenatal vitamin for 6 weeks or until breastfeeding is discontinued.  If nipples are sore, apply a few drops of breast milk after nursing and let air dry or you can use Lanolin cream.   If breasts are engorged, apply warmth and express milk.    If you are not breastfeeding:  Wear a tight bra and  do not stimulate your breasts.  Avoid handling your breasts and do not express milk.  You may apply ice packs or cabbage leaves to relieve discomfort from engorgement.  If your breasts become warm to touch, reddened or lumps develop call your doctor.    Take blood pressures twice daily, May take procardia for elevated blood pressures.  Dr. aMrquez to send RX for Fioricet to pharmacy, may take PRN for headaches

## 2025-01-02 NOTE — CONSULTS
[facility]  Department of Neurology  Neurology Consultation Note        PATIENT NAME: Telma Greenberg  MRN: [id number]  CSN: [id number]      TODAY'S DATE: 01/02/2025  ADMIT DATE: 12/30/2024                            CONSULTING PROVIDER: Jude Hays MD  CONSULT REQUESTED BY: Marisol Marquez MD      Reason for consult:  Headache      History obtained from chart review and the patient.    HPI per EMR: Telma Greenberg is a 33yo status post vaginal delivery on 12/26/2024, complicated by postpartum hemorrhage requiring D and C with blood transfusion, presented with complaints of palpitations, shortness of breath and tingling in the extremities.  She received transfusion of 2nd unit PRBC, chest CT was done that was negative for PE.  During the hospital course, patient noted to have headaches with some elevated blood pressure and urinary frequency for which she was started on magnesium prophylaxis for possible postpartum preeclampsia.  But given that patient is still had continued headaches, Hospital Medicine was consulted.       At the time of my exam, she mentioned her headache is more in the left occipital region and in the left temporal region, that did improve transiently with Tylenol and Percocet but it returns back frequently.  No ophthalmoplegia, no symptoms of ear fullness, no tinnitus, no tenderness in the temporal region.  No vision changes, no hearing changes, no numbness or weakness.  But she does complain she has been having tingling in bilateral hands since her 3rd trimester, and was told likely associated with the edema during pregnancy.     She does have a history of migraine, but she mentions these headaches are new from her usual migraine attacks.     On exam, neurologically no signs noted.  Full strength, full sensations, cranial nerves intact.     She had minimal leukocytosis with white count at 12.9 during presentation, postpartum anemia with hemoglobin 7.7 at presentation, mildly elevated  LFTs with AST at 96, ALT 61, BNP slightly high at 129, troponin slightly high at 22.1 which downtrended to 19.6.  TSH normal at 1.8.  UA was negative.  Urine analysis showed urine protein at 18, protein/creatinine ratio elevated at 0.48.  Labs from yesterday showed normal white count, hemoglobin improved to 8.8, LFTs downtrended.  Calcium low at 7.7.  Chest x-ray negative.    Neurology consult:  Patient was seen examined by me.  She is postpartum with vaginal delivery on 12/26.  Yesterday she had a headache on the left side of the head radiating from the occipital region to the temporal region.  Was throbbing pain ranging from 2-8/10 in intensity.  No nausea, vomiting or photophobia/phonophobia was associated with the headache.  She has a history of migraines and usually gets about 1 headache a month or less and this was not typical of her migraine headache.    She states that she used to drink coffee daily and she cut down coffee recently after childbirth which she suspected was the cause of her headache.  She got Fioricet yesterday after which her headache improved.  Currently she does not have any headache.      She denies any vision trouble, nausea vomiting, unilateral weakness or sensory changes.  Currently she feels back to her normal.    PREVIOUS MEDICAL HISTORY:  Past Medical History:   Diagnosis Date    Abnormal Pap smear of cervix 06/2014    colpo done no bx bc was pregnant    Depression     1st pregnancy    HPV in female     Hyperlipidemia     Hypotension     Migraines     PFO (patent foramen ovale) 2023     PREVIOUS SURGICAL HISTORY:  No past surgical history on file.  FAMILY MEDICAL HISTORY:  Family History   Problem Relation Name Age of Onset    Breast cancer Maternal Grandmother      Rectal cancer Maternal Grandmother      Lung cancer Father          smoker    Pneumonia Mother          smoker    Stroke Brother      No Known Problems Brother      Alcohol abuse Brother      Alcohol abuse Brother       Alzheimer's disease Paternal Grandmother      Colon cancer Neg Hx      Ovarian cancer Neg Hx       ALLERGIES:  Review of patient's allergies indicates:  No Known Allergies  HOME MEDICATIONS:  Prior to Admission medications    Medication Sig Start Date End Date Taking? Authorizing Provider   docusate sodium (COLACE) 100 MG capsule Take 100 mg by mouth 2 (two) times daily.   Yes Provider, Historical   ferrous sulfate 325 (65 FE) MG EC tablet Take 325 mg by mouth 3 (three) times daily with meals.   Yes Provider, Historical   magnesium oxide (MAG-OX) 400 mg (241.3 mg magnesium) tablet Take 400 mg by mouth once daily.   Yes Provider, Historical   prenatal vit/iron fum/folic ac (PRENATAL 1+1 ORAL) Take 1 capsule by mouth once daily.   Yes Provider, Historical   ibuprofen (ADVIL,MOTRIN) 600 MG tablet Take 1 tablet (600 mg total) by mouth every 6 (six) hours as needed (cramping).  Patient not taking: Reported on 12/30/2024 12/28/24   Marisol Marquez MD   oxyCODONE-acetaminophen (PERCOCET) 5-325 mg per tablet Take 1-2 tablets by mouth every 4 (four) hours as needed for Pain.  Patient not taking: Reported on 12/30/2024 12/28/24   Marisol Marquez MD     CURRENT SCHEDULED MEDICATIONS:   ferrous sulfate  1 tablet Oral Daily    hydrocortisone   Rectal BID    prenatal vitamin  1 tablet Oral Daily     CURRENT INFUSIONS:    CURRENT PRN MEDICATIONS:    Current Facility-Administered Medications:     0.9%  NaCl infusion (for blood administration), , Intravenous, Q24H PRN    acetaminophen, 650 mg, Oral, Q6H PRN    bisacodyL, 5 mg, Oral, Daily PRN    butalbital-acetaminophen-caffeine -40 mg, 1 tablet, Oral, Q4H PRN    calcium gluconate, 1 g, Intravenous, PRN    hydrALAZINE, 10 mg, Intravenous, Once PRN    hydrALAZINE, 5 mg, Intravenous, Once PRN    ibuprofen, 800 mg, Oral, Q6H PRN    labetalol, 20 mg, Intravenous, Once PRN    labetalol, 40 mg, Intravenous, Once PRN    labetalol, 40 mg, Intravenous, Once PRN    labetalol, 80 mg,  Intravenous, Once PRN    magnesium oxide, 800 mg, Oral, PRN    magnesium oxide, 800 mg, Oral, PRN    oxyCODONE-acetaminophen, 1 tablet, Oral, Q4H PRN    oxyCODONE-acetaminophen, 2 tablet, Oral, Q4H PRN    oxymetazoline, 2 spray, Each Nostril, BID PRN    potassium bicarbonate, 35 mEq, Oral, PRN    potassium bicarbonate, 50 mEq, Oral, PRN    potassium bicarbonate, 60 mEq, Oral, PRN    potassium, sodium phosphates, 2 packet, Oral, PRN    potassium, sodium phosphates, 2 packet, Oral, PRN    potassium, sodium phosphates, 2 packet, Oral, PRN    REVIEW OF SYSTEMS:  Please refer to the HPI for all pertinent positive and negative findings. A comprehensive review of all other systems was negative.    PHYSICAL EXAM:  Patient Vitals for the past 24 hrs:   BP Temp Temp src Pulse Resp SpO2   01/02/25 0835 -- -- -- 86 -- 97 %   01/02/25 0830 116/62 98.6 °F (37 °C) Oral -- 17 97 %   01/02/25 0532 117/67 -- -- 83 -- --   01/02/25 0220 120/71 -- -- 83 -- --   01/01/25 2342 132/72 -- -- 82 -- --   01/01/25 2228 128/73 98.3 °F (36.8 °C) Oral 96 -- --   01/01/25 1949 -- -- -- 105 -- 100 %   01/01/25 1944 -- -- -- 104 -- 100 %   01/01/25 1943 131/74 -- -- 99 18 --   01/01/25 1939 -- -- -- 96 -- 99 %   01/01/25 1931 -- -- -- 90 -- 99 %   01/01/25 1926 -- -- -- 92 -- 98 %   01/01/25 1925 (!) 156/86 -- -- 88 -- --   01/01/25 1923 (!) 162/86 -- -- 86 -- --   01/01/25 1438 -- -- -- 86 -- 97 %   01/01/25 1433 -- -- -- 89 -- 97 %   01/01/25 1428 -- -- -- 89 -- 98 %   01/01/25 1423 -- -- -- 92 -- 98 %   01/01/25 1418 -- -- -- 91 -- 98 %   01/01/25 1413 -- -- -- 94 -- 99 %   01/01/25 1408 -- -- -- 91 -- 99 %   01/01/25 1403 -- -- -- 93 -- 99 %   01/01/25 1358 (!) 145/82 -- -- 94 -- 99 %   01/01/25 1353 -- -- -- 97 -- 99 %   01/01/25 1345 -- -- -- 91 -- 100 %   01/01/25 1340 -- -- -- 96 -- 99 %   01/01/25 1335 -- -- -- 91 -- 98 %   01/01/25 1330 -- -- -- 97 -- 98 %   01/01/25 1325 -- -- -- 86 -- 98 %   01/01/25 1320 -- -- -- 82 -- 98 %   01/01/25  1315 -- -- -- 88 -- 98 %   01/01/25 1310 -- -- -- 92 -- 99 %   01/01/25 1305 -- -- -- 89 -- 98 %   01/01/25 1300 -- -- -- 88 -- 97 %   01/01/25 1258 132/79 -- -- 86 -- --   01/01/25 1255 -- -- -- 86 -- 97 %   01/01/25 1250 -- -- -- 87 -- 97 %   01/01/25 1245 -- -- -- 91 -- 97 %   01/01/25 1240 -- -- -- 89 -- 98 %   01/01/25 1235 -- -- -- 89 -- 98 %   01/01/25 1230 -- -- -- 92 -- 98 %   01/01/25 1225 -- -- -- 90 -- 98 %   01/01/25 1220 -- -- -- 94 -- 99 %   01/01/25 1215 -- -- -- 92 -- 98 %   01/01/25 1210 -- -- -- 89 -- 98 %   01/01/25 1205 -- -- -- 95 -- 98 %   01/01/25 1200 -- -- -- 89 -- 98 %   01/01/25 1158 (!) 141/85 -- -- 86 -- --   01/01/25 1155 -- -- -- 89 -- 99 %   01/01/25 1150 -- -- -- 89 -- 100 %   01/01/25 1145 -- -- -- 91 -- 99 %   01/01/25 1140 -- -- -- 90 -- 98 %   01/01/25 1135 -- -- -- 94 -- 99 %   01/01/25 1130 -- -- -- 94 -- 98 %   01/01/25 1125 -- -- -- 92 -- 98 %   01/01/25 1120 -- -- -- 90 -- 98 %   01/01/25 1115 -- -- -- 107 -- (!) 92 %   01/01/25 1102 -- -- -- 93 -- 98 %   01/01/25 1057 (!) 140/80 -- -- 85 -- 98 %   01/01/25 1052 -- -- -- 88 -- 99 %   01/01/25 1047 -- -- -- 83 -- 96 %   01/01/25 1042 -- -- -- 74 -- 97 %   01/01/25 1037 -- -- -- 77 -- 97 %   01/01/25 1032 -- -- -- 81 -- 96 %   01/01/25 1027 -- -- -- 80 -- 96 %   01/01/25 1022 -- -- -- 79 -- 96 %   01/01/25 1017 -- -- -- 78 -- 96 %   01/01/25 1012 -- -- -- 78 -- 96 %       GENERAL APPEARANCE: Alert, well-developed, well-nourished female in no acute distress.  HEENT: Normocephalic and atraumatic. PERRL. Oropharynx unremarkable.  PULM: Normal respiratory effort. No accessory muscle use.  CV: RRR.  ABDOMEN: Soft, nontender.  EXTREMITIES: No obvious signs of vascular compromise. Pulses present. No cyanosis, clubbing or edema.  SKIN: Clear; no rashes, lesions or skin breaks in exposed areas.    NEURO:  MENTAL STATUS: Patient awake and oriented to time, place, and person, recent/remote memory normal, attention span/concentration  "normal, and speech fluent without paraphasic errors.  Affect euthymic.    CRANIAL NERVES:  CN I: Not tested.  CN II: Fundoscopic exam deferred.  CN III, IV, VI: Pupils equal, round and reactive to light.  Extraocular movements full and intact.  CN V: Facial sensation normal.  CN VII: Facial asymmetry absent.  CN VIII: Hearing grossly normal and equal bilaterally.  No skew deviation or pathologic nystagmus.  CN IX, X: Palate elevates symmetrically. Speech/articulation is clear without dysarthria.  CN XI: Shoulder shrug and chin rotation equal with good strength.  CN XII: Tongue protrusion midline.    MOTOR:  Bulk normal. Tone normal and symmetric throughout.  Abnormal movements absent.  Tremor: none present.  Strength 5/5 throughout.    REFLEXES:  DTRs 2+ throughout.  Plantar response downgoing bilaterally.  SENSATION: grossly intact throughout.  COORDINATION: normal finger-to-nose.  STATION: not tested.  GAIT: not tested.      Labs:  Recent Labs   Lab 12/30/24  1511 12/31/24  1122 01/02/25  0722    139 139   K 4.0 4.3 4.4    109 107   CO2 24 23 27   BUN 9 8 13   CREATININE 0.6 0.7 0.7   GLU 78 122* 81   CALCIUM 8.3* 7.7* 7.5*   PHOS  --   --  3.6   MG  --   --  2.9*     Recent Labs   Lab 12/30/24  1511 12/31/24  0755 12/31/24  1122 01/02/25  0722   WBC 12.94*  --  11.23 15.59*   HGB 7.7* 8.3* 8.8* 9.8*   HCT 24.8* 26.7* 27.5* 30.7*     --  200 266     Recent Labs   Lab 12/26/24  1953 12/30/24  1511 12/31/24  1122   ALBUMIN 2.7* 3.3* 3.1*   PROT 4.9* 5.9* 5.4*   BILITOT 0.7 0.3 0.4   ALKPHOS 149* 113 99   ALT 8* 61* 43   AST 24 96* 46*     Lab Results   Component Value Date    INR 0.9 12/30/2024     Lab Results   Component Value Date    TRIG 184 (H) 01/02/2025    HDL 63 01/02/2025    CHOLHDL 21.4 01/02/2025     Lab Results   Component Value Date    HGBA1C 5.0 01/02/2025     No results found for: "PROTEINCSF", "GLUCCSF", "WBCCSF"    Imaging:  I have reviewed and interpreted the pertinent imaging and " lab results.      CT Head Without Contrast  Narrative: EXAMINATION:  CT HEAD WITHOUT CONTRAST    CLINICAL HISTORY:  Headache, sudden, severe;Occipital and left temporal headaches, postpartum.;    TECHNIQUE:  Axial CT imaging obtained through the brain without IV contrast.    CMS Mandated Quality Data-CT Radiation Dose-436    All CT scans at this facility dose modulation, iterative reconstruction, and or weight-based dosing when appropriate to reduce radiation dose to as low as reasonably achievable.    COMPARISON:  Brain MRI 10/29/2021    FINDINGS:  Negative for acute intracranial hemorrhage, midline shift, or mass effect.  Ventricles and sulci are normal in size.  Gray-white differentiation is maintained.  Cerebellar hemispheres and brainstem are unremarkable.    No calvarial lesion or fracture.  Mastoid air cells are clear.  Visualized paranasal sinuses are clear.  Impression: No CT evidence of acute intracranial pathology.    Electronically signed by: Julio Marquez  Date:    01/01/2025  Time:    14:56         ASSESSMENT & PLAN:      Headache   Postpartum   Hypertension    Plan:   Headache could likely be from hypertension/postpartum preeclampsia.  Intracranial pathology needs to be ruled out.    MRI brain, MR venogram ordered and pending.    Management of hypertension per primary team.  Patient was started on magnesium.  Patient has some tenderness in the mid back/thoracic region-likely muscle spasm.  Consider x-rays of the thoracic spine.  Currently does not have headache.  Will hold off on therapeutic management at this time.     Will follow on imaging.  Please call with questions          Thank you kindly for including us in the care of this patient. Please do not hesitate to contact us with any questions.             Jude Hays MD  Neurology/vascular Neurology  Date of Service: 01/02/2025  10:11  AM    --------------------------------------------------------------------------------------------------------------------------------------------------------------------------------------------------------------------------------------------------------------  Please note: This note was transcribed using voice recognition software. Because of this technology there are often uinintended grammatical, spelling, and other transcription errors. Please disregard these errors.

## 2025-01-02 NOTE — DISCHARGE SUMMARY
[facility]  Discharge Summary  Obstetrics - Triage      Admit Date: 2024    Discharge Date and Time:  2025 1:43 PM    Attending Physician: Marisol Marquez MD     Discharge Provider: Marisol Marquez    Reason for Admission:  s/p  , symptomatic anemia    Hospital Course (synopsis of major diagnoses, care, treatment, and services provided during the course of the hospital stay):     Telma Greenberg is a 34 y.o.  female who presented to labor and delivery 4 days status post vaginal delivery, postpartum D and C, and hemorrhage.  She was admitted with symptomatic anemia and given 1 unit of packed red blood cells.  Her blood pressures were also in the moderate and sometimes severely elevated range and she complained of a headache.  She was put on magnesium sulfate and treated for postpartum preeclampsia.  She had excellent diuresis and her blood pressures normalized after the magnesium.  However her headache persisted and she had a neuro consult, head CT, head MRI and all were normal.  She also complained of mid back pain.  She had x-rays of the spine which were normal.  She was given Fioricet and her headache has since resolved.  She will be discharged home with a prescription for Fioricet and a prescription for Procardia 10 mg which she can take every 6 hours if she happens to have any severe range blood pressures at home.  She has been instructed to take her blood pressure twice a day only, but if it is in severe range she will take a dose of Procardia and recheck in an hour.  If it is still high she will call the office or return to the ER.  She is going to follow up with me next week and she has been given instructions to come back if she has any dizziness, chest pain, severe range blood pressures that do not go down, if persistent severe headache returns, or any other concerns.  She had high cholesterol and high BNP on internal medicine workup - The hospitalist team will come see  her and clear her before discharge.      Gen - NAD  Uterus - firm below umbilicus, non tender  Ext - no edema, no calf tenderness     She was admitted to the labor and delivery triage area. Vital signs on admit were: Temp: 98.3 °F (36.8 °C), Pulse: (!) 120, Resp: (!) 22, BP: (!) 158/100, SpO2: 98 %.        Final Diagnoses:    Principal Problem: Anemia   Secondary Diagnoses:  --    Discharged Condition: good    Disposition: Home or Self Care    Follow Up/Patient Instructions:     Medications:  Reconciled Home Medications:      Medication List        CONTINUE taking these medications      docusate sodium 100 MG capsule  Commonly known as: COLACE  Take 100 mg by mouth 2 (two) times daily.     ferrous sulfate 325 (65 FE) MG EC tablet  Take 325 mg by mouth 3 (three) times daily with meals.     ibuprofen 600 MG tablet  Commonly known as: ADVIL,MOTRIN  Take 1 tablet (600 mg total) by mouth every 6 (six) hours as needed (cramping).     oxyCODONE-acetaminophen 5-325 mg per tablet  Commonly known as: PERCOCET  Take 1-2 tablets by mouth every 4 (four) hours as needed for Pain.     PRENATAL 1+1 ORAL  Take 1 capsule by mouth once daily.            STOP taking these medications      magnesium oxide 400 mg (241.3 mg magnesium) tablet  Commonly known as: MAG-OX            Discharge Procedure Orders   Diet Adult Regular     Lifting restrictions   Scheduling Instructions: No lifting greater than 10 # for 6 weeks     No driving until:   Order Comments: No driving for 2 weeks      Follow-up Information       Marisol Marquez MD. Schedule an appointment as soon as possible for a visit in 1 week(s).    Specialties: Obstetrics, Obstetrics and Gynecology  Why: For follow up  Contact information:  [address]  [address]  [facility]  [place] [address]  [phone]

## 2025-01-03 ENCOUNTER — TELEPHONE (OUTPATIENT)
Dept: FAMILY MEDICINE | Facility: CLINIC | Age: 35
End: 2025-01-03
Payer: COMMERCIAL

## 2025-01-03 LAB
BLD PROD TYP BPU: NORMAL
BLD PROD TYP BPU: NORMAL
BLOOD UNIT EXPIRATION DATE: NORMAL
BLOOD UNIT EXPIRATION DATE: NORMAL
BLOOD UNIT TYPE CODE: 5100
BLOOD UNIT TYPE CODE: 5100
BLOOD UNIT TYPE: NORMAL
BLOOD UNIT TYPE: NORMAL
CODING SYSTEM: NORMAL
CODING SYSTEM: NORMAL
CROSSMATCH INTERPRETATION: NORMAL
CROSSMATCH INTERPRETATION: NORMAL
DISPENSE STATUS: NORMAL
DISPENSE STATUS: NORMAL
NUM UNITS TRANS PACKED RBC: NORMAL
NUM UNITS TRANS PACKED RBC: NORMAL

## 2025-01-03 NOTE — ASSESSMENT & PLAN NOTE
- lipid panel shows elevated total cholesterol at 294, elevated LDL at 194  - she qualifies for statin therapy based on elevated LDL  - given history of stroke in the family, she is at high-risk  - discussed about risks versus benefits of statin and fibrates during breastfeeding  - she would like to follow up after postpartum with her cardiologist, Dr. Hickey  - educated her on diet and exercise and conservative management with a repeat lipid panel in 4-5 weeks after discharge  - added all the instructions to her paperwork  - she verbalized understanding

## 2025-01-03 NOTE — TELEPHONE ENCOUNTER
----- Message from  Princess sent at 1/2/2025  3:48 PM CST -----  Patient needs hospital follow up please. D/c 1/2/25 postpartum hemorrhage, headache, hypocalcemia

## 2025-01-03 NOTE — ASSESSMENT & PLAN NOTE
The most recent calcium and albumin results listed below.  Recent Labs     12/31/24  1122 01/02/25  0722   CALCIUM 7.7* 7.5*   ALBUMIN 3.1*  --        Plan  - Will replace calcium and monitor electrolytes closely.  - The patient's hypocalcemia is worsening. Will adjust treatment as follows:  Replacement  - corrected calcium at 8.3  - Albumin today pending  - recommend Tums  - repeat BMP in 5-7 days after discharge

## 2025-01-03 NOTE — SUBJECTIVE & OBJECTIVE
Interval History:  Patient is seen and examined this morning.  Neurology was consulted who recommended MRI, MRV that were negative.  X-ray spine also ordered given her concern of muscle spasm, negative for any acute pathology    Her headache improved with Fioricet and Tylenol    Has minimal leukocytosis, likely reactive, stress.  UA negative.  No other infectious signs or symptoms.  No fever clinically.    OBGYN planning discharge today  Review of Systems negative  Objective:     Vital Signs (Most Recent):  Temp: 98.6 °F (37 °C) (01/02/25 0830)  Pulse: 83 (01/02/25 1330)  Resp: 18 (01/02/25 1330)  BP: (!) 156/90 (Dr. Marquez at bedside, reviewed blood pressure) (01/02/25 1330)  SpO2: 97 % (01/02/25 0835) Vital Signs (24h Range):  Temp:  [98.3 °F (36.8 °C)-98.6 °F (37 °C)] 98.6 °F (37 °C)  Pulse:  [] 83  Resp:  [17-18] 18  SpO2:  [97 %-100 %] 97 %  BP: (116-162)/(62-90) 156/90     Weight: 67.1 kg (148 lb)  Body mass index is 27.07 kg/m².  No intake or output data in the 24 hours ending 01/02/25 1816      Physical Exam  Constitutional:       General: She is not in acute distress.     Appearance: Normal appearance. She is not ill-appearing.   HENT:      Head: Normocephalic and atraumatic.      Nose: Nose normal.      Mouth/Throat:      Mouth: Mucous membranes are moist.   Eyes:      Extraocular Movements: Extraocular movements intact.      Pupils: Pupils are equal, round, and reactive to light.   Cardiovascular:      Rate and Rhythm: Normal rate and regular rhythm.      Pulses: Normal pulses.   Pulmonary:      Effort: Pulmonary effort is normal. No respiratory distress.      Breath sounds: Normal breath sounds. No wheezing.   Abdominal:      General: Abdomen is flat. There is no distension.      Palpations: Abdomen is soft.      Tenderness: There is no abdominal tenderness.   Musculoskeletal:         General: No swelling or deformity.      Cervical back: No rigidity or tenderness.   Skin:     General: Skin is warm.       Coloration: Skin is pale.      Findings: No erythema or rash.   Neurological:      General: No focal deficit present.      Mental Status: She is alert and oriented to person, place, and time. Mental status is at baseline.      Cranial Nerves: No cranial nerve deficit.      Sensory: No sensory deficit.      Motor: No weakness.      Coordination: Coordination normal.   Psychiatric:         Mood and Affect: Mood normal.         Behavior: Behavior normal.             Significant Labs: All pertinent labs within the past 24 hours have been reviewed.    Significant Imaging: I have reviewed all pertinent imaging results/findings within the past 24 hours.

## 2025-01-03 NOTE — ASSESSMENT & PLAN NOTE
Anemia is likely due to acute blood loss which was from postpartum hemorrhage and   . Most recent hemoglobin and hematocrit are listed below.  Recent Labs     12/31/24  0755 12/31/24  1122 01/02/25  0722   HGB 8.3* 8.8* 9.8*   HCT 26.7* 27.5* 30.7*       Plan  - Monitor serial CBC: Daily  - Transfuse PRBC if patient becomes hemodynamically unstable, symptomatic or H/H drops below 7/21.  - Patient has received 2 units of PRBCs on    - Patient's anemia is currently improving  - iron panel showed normal iron, elevated TIBC, ferritin at 17.4, saturated iron at 8  - at home on iron supplement

## 2025-01-03 NOTE — PROGRESS NOTES
[facility]  Progress Note    Patient Name: Telma Greenberg  MRN: [id number]  Patient Class: IP- Inpatient   Admission Date: 12/30/2024  Length of Stay: 2 days  Attending Physician: No att. providers found  Primary Care Provider: Caitlyn Hayward MD        Subjective     Principal Problem:Anemia        HPI:  Patient is a 34-year-old female who is status post vaginal delivery on 12/26/2024, complicated by postpartum hemorrhage requiring D and C with blood transfusion, presented with complaints of palpitations, shortness of breath and tingling in the extremities.  She received transfusion of 2nd unit PRBC, chest CT was done that was negative for PE.  During the hospital course, patient noted to have headaches with some elevated blood pressure and urinary frequency for which she was started on magnesium prophylaxis for possible postpartum preeclampsia.  But given that patient is still had continued headaches, Hospital Medicine was consulted.      At the time of my exam, she mentioned her headache is more in the left occipital region and in the left temporal region, that did improve transiently with Tylenol and Percocet but it returns back frequently.  No ophthalmoplegia, no symptoms of ear fullness, no tinnitus, no tenderness in the temporal region.  No vision changes, no hearing changes, no numbness or weakness.  But she does complain she has been having tingling in bilateral hands since her 3rd trimester, and was told likely associated with the edema during pregnancy.    She does have a history of migraine, but she mentions these headaches are new from her usual migraine attacks.    On exam, neurologically no signs noted.  Full strength, full sensations, cranial nerves intact.    She had minimal leukocytosis with white count at 12.9 during presentation, postpartum anemia with hemoglobin 7.7 at presentation, mildly elevated LFTs with AST at 96, ALT 61, BNP slightly high at 129, troponin  slightly high at 22.1 which downtrended to 19.6.  TSH normal at 1.8.  UA was negative.  Urine analysis showed urine protein at 18, protein/creatinine ratio elevated at 0.48.  Labs from yesterday showed normal white count, hemoglobin improved to 8.8, LFTs downtrended.  Calcium low at 7.7.  Chest x-ray negative.    Overview/Hospital Course:  No notes on file    Interval History:  Patient is seen and examined this morning.  Neurology was consulted who recommended MRI, MRV that were negative.  X-ray spine also ordered given her concern of muscle spasm, negative for any acute pathology    Her headache improved with Fioricet and Tylenol    Has minimal leukocytosis, likely reactive, stress.  UA negative.  No other infectious signs or symptoms.  No fever clinically.    OBGYN planning discharge today  Review of Systems negative  Objective:     Vital Signs (Most Recent):  Temp: 98.6 °F (37 °C) (01/02/25 0830)  Pulse: 83 (01/02/25 1330)  Resp: 18 (01/02/25 1330)  BP: (!) 156/90 (Dr. Marquez at bedside, reviewed blood pressure) (01/02/25 1330)  SpO2: 97 % (01/02/25 0835) Vital Signs (24h Range):  Temp:  [98.3 °F (36.8 °C)-98.6 °F (37 °C)] 98.6 °F (37 °C)  Pulse:  [] 83  Resp:  [17-18] 18  SpO2:  [97 %-100 %] 97 %  BP: (116-162)/(62-90) 156/90     Weight: 67.1 kg (148 lb)  Body mass index is 27.07 kg/m².  No intake or output data in the 24 hours ending 01/02/25 1816      Physical Exam  Constitutional:       General: She is not in acute distress.     Appearance: Normal appearance. She is not ill-appearing.   HENT:      Head: Normocephalic and atraumatic.      Nose: Nose normal.      Mouth/Throat:      Mouth: Mucous membranes are moist.   Eyes:      Extraocular Movements: Extraocular movements intact.      Pupils: Pupils are equal, round, and reactive to light.   Cardiovascular:      Rate and Rhythm: Normal rate and regular rhythm.      Pulses: Normal pulses.   Pulmonary:      Effort: Pulmonary effort is normal. No respiratory  distress.      Breath sounds: Normal breath sounds. No wheezing.   Abdominal:      General: Abdomen is flat. There is no distension.      Palpations: Abdomen is soft.      Tenderness: There is no abdominal tenderness.   Musculoskeletal:         General: No swelling or deformity.      Cervical back: No rigidity or tenderness.   Skin:     General: Skin is warm.      Coloration: Skin is pale.      Findings: No erythema or rash.   Neurological:      General: No focal deficit present.      Mental Status: She is alert and oriented to person, place, and time. Mental status is at baseline.      Cranial Nerves: No cranial nerve deficit.      Sensory: No sensory deficit.      Motor: No weakness.      Coordination: Coordination normal.   Psychiatric:         Mood and Affect: Mood normal.         Behavior: Behavior normal.             Significant Labs: All pertinent labs within the past 24 hours have been reviewed.    Significant Imaging: I have reviewed all pertinent imaging results/findings within the past 24 hours.    Assessment and Plan     * Anemia  Anemia is likely due to acute blood loss which was from postpartum hemorrhage and   . Most recent hemoglobin and hematocrit are listed below.  Recent Labs     12/31/24  0755 12/31/24  1122 01/02/25  0722   HGB 8.3* 8.8* 9.8*   HCT 26.7* 27.5* 30.7*       Plan  - Monitor serial CBC: Daily  - Transfuse PRBC if patient becomes hemodynamically unstable, symptomatic or H/H drops below 7/21.  - Patient has received 2 units of PRBCs on    - Patient's anemia is currently improving  - iron panel showed normal iron, elevated TIBC, ferritin at 17.4, saturated iron at 8  - at home on iron supplement    Hypercholesterolemia  - lipid panel shows elevated total cholesterol at 294, elevated LDL at 194  - she qualifies for statin therapy based on elevated LDL  - given history of stroke in the family, she is at high-risk  - discussed about risks versus benefits of statin and fibrates during  breastfeeding  - she would like to follow up after postpartum with her cardiologist, Dr. Hickey  - educated her on diet and exercise and conservative management with a repeat lipid panel in 4-5 weeks after discharge  - added all the instructions to her paperwork  - she verbalized understanding      Leukocytosis  - likely reactive stress-induced  - improving since her last admission  - no fever, no infectious signs or symptoms  - she feels much better today  - return precautions advised I also advised her to call her PCP if she is developing an infection signs or symptoms    Tingling of both upper extremities  - improving with improvement in edema  - vitamin B12 level normal        Hypocalcemia  The most recent calcium and albumin results listed below.  Recent Labs     12/31/24  1122 01/02/25  0722   CALCIUM 7.7* 7.5*   ALBUMIN 3.1*  --        Plan  - Will replace calcium and monitor electrolytes closely.  - The patient's hypocalcemia is worsening. Will adjust treatment as follows:  Replacement  - corrected calcium at 8.3  - Albumin today pending  - recommend Tums  - repeat BMP in 5-7 days after discharge        Headache  - neurologically no signs on exam  - Fioricet p.r.n.  - discussed with OBGYN, it is okay during lactation  - CT brain without contrast negative  - neurology evaluated, MRI brain, MRV brain obtained, negative  - symptoms significantly improved with Fioricet  - outpatient follow up with PCP regarding her migraine  - she also has a neurologist, she will set up an appointment as outpatient  - discussed with Dr. Hays, cleared for discharge      Pre-eclampsia in postpartum period  - was on magnesium sulfate  - management as per OBGYN  - blood pressure control        Discussed above plan with nurse and OBGYN    VTE Risk Mitigation (From admission, onward)      None            Discharge Planning   [place]: 1/2/2025     Code Status: Prior   Medical Readiness for Discharge Date: 1/2/2025  Discharge Plan A: Home  with family   Discharge Delays: None known at this time                    Fadia Menendez MD  Department of Hospital Medicine   [facility]

## 2025-01-03 NOTE — ASSESSMENT & PLAN NOTE
- likely reactive stress-induced  - improving since her last admission  - no fever, no infectious signs or symptoms  - she feels much better today  - return precautions advised I also advised her to call her PCP if she is developing an infection signs or symptoms

## 2025-01-03 NOTE — ASSESSMENT & PLAN NOTE
- neurologically no signs on exam  - Fioricet p.r.n.  - discussed with OBGYN, it is okay during lactation  - CT brain without contrast negative  - neurology evaluated, MRI brain, MRV brain obtained, negative  - symptoms significantly improved with Fioricet  - outpatient follow up with PCP regarding her migraine  - she also has a neurologist, she will set up an appointment as outpatient  - discussed with Dr. Hays, cleared for discharge

## 2025-01-16 ENCOUNTER — PATIENT MESSAGE (OUTPATIENT)
Dept: CARDIOLOGY | Facility: CLINIC | Age: 35
End: 2025-01-16
Payer: COMMERCIAL

## 2025-02-13 LAB
HPV APTIMA: POSITIVE
PAP RECOMMENDATION EXT: ABNORMAL
PAP SMEAR: ABNORMAL

## 2025-05-30 ENCOUNTER — PATIENT OUTREACH (OUTPATIENT)
Dept: ADMINISTRATIVE | Facility: HOSPITAL | Age: 35
End: 2025-05-30
Payer: COMMERCIAL

## 2025-05-30 NOTE — PROGRESS NOTES
Population Health Chart Review & Patient Outreach Details      Additional [facility] Health Notes:               Updates Requested / Reviewed:      Updated Care Coordination Note, Care Everywhere, , External Sources: [facility], and Immunizations Reconciliation Completed or Queried: [place]         Health Maintenance Topics Overdue:      [facility] Score: 0     Patient is not due for any topics at this time.                       Health Maintenance Topic(s) Outreach Outcomes & Actions Taken:    Cervical Cancer Screening - Outreach Outcomes & Actions Taken  : External Records Uploaded & Care Team Updated if Applicable

## 2025-08-20 ENCOUNTER — PATIENT MESSAGE (OUTPATIENT)
Dept: ADMINISTRATIVE | Facility: HOSPITAL | Age: 35
End: 2025-08-20
Payer: COMMERCIAL

## (undated) DEVICE — SYS JADA POSTPARTUM HEMORRHAGE